# Patient Record
Sex: FEMALE | Race: WHITE | Employment: OTHER | URBAN - METROPOLITAN AREA
[De-identification: names, ages, dates, MRNs, and addresses within clinical notes are randomized per-mention and may not be internally consistent; named-entity substitution may affect disease eponyms.]

---

## 2018-11-22 ENCOUNTER — HOSPITAL ENCOUNTER (EMERGENCY)
Age: 48
Discharge: HOME | End: 2018-11-22
Payer: COMMERCIAL

## 2018-11-22 VITALS
HEIGHT: 62 IN | DIASTOLIC BLOOD PRESSURE: 60 MMHG | OXYGEN SATURATION: 97 % | HEART RATE: 106 BPM | SYSTOLIC BLOOD PRESSURE: 152 MMHG | BODY MASS INDEX: 30.82 KG/M2 | TEMPERATURE: 97.6 F | RESPIRATION RATE: 16 BRPM | WEIGHT: 167.5 LBS

## 2018-11-22 DIAGNOSIS — M53.3 COCCYX PAIN: Primary | ICD-10-CM

## 2018-11-22 PROCEDURE — 99283 EMERGENCY DEPT VISIT LOW MDM: CPT

## 2018-11-22 RX ORDER — DIVALPROEX SODIUM 250 MG/1
250 TABLET, DELAYED RELEASE ORAL 3 TIMES DAILY
Status: ON HOLD | COMMUNITY
End: 2022-02-12 | Stop reason: HOSPADM

## 2018-11-22 RX ORDER — KETOROLAC TROMETHAMINE 10 MG/1
10 TABLET, FILM COATED ORAL EVERY 6 HOURS PRN
Qty: 20 TABLET | Refills: 0 | Status: ON HOLD | OUTPATIENT
Start: 2018-11-22 | End: 2022-02-12 | Stop reason: HOSPADM

## 2018-11-22 RX ORDER — ATORVASTATIN CALCIUM 40 MG/1
40 TABLET, FILM COATED ORAL DAILY
COMMUNITY

## 2018-11-22 ASSESSMENT — PAIN DESCRIPTION - DESCRIPTORS: DESCRIPTORS: BURNING;DISCOMFORT

## 2018-11-22 ASSESSMENT — PAIN DESCRIPTION - LOCATION: LOCATION: RECTUM

## 2018-11-22 ASSESSMENT — PAIN DESCRIPTION - PAIN TYPE: TYPE: ACUTE PAIN

## 2018-11-22 ASSESSMENT — PAIN SCALES - GENERAL: PAINLEVEL_OUTOF10: 9

## 2018-11-22 NOTE — ED PROVIDER NOTES
Independent MLP      HPI:  11/22/18,   Time: 1:30 PM         Elisa Carter is a 50 y.o. female presenting to the ED for redness and pain at base of spine, beginning few days ago. The complaint has been intermittent, moderate in severity, and worsened by sitting. Pt presents with intermittent pain and burning at base of spine. States there has been intermittent drainage past few days. States she was seen yesterday by GI and given script for abs. States they told her to come to ED but she waited til she had someone to watch her child. She has never had anything like this before. No hx of skin or soft tissue infection. No known hx of MRSA. States she didn't  abs yet because pharmacy was closed. Denies fever, chills, vomiting or abdominal pain. Bowels moving well. ROS:     Constitutional: Negative for fever and chills  HENT: Negative for ear pain, sore throat and sinus pressure  Eyes: Negative for pain, discharge and redness  Respiratory:  Negative for shortness of breath, cough and wheezing  Cardiovascular: Negative for CP, edema or palpitations  Gastrointestinal: Negative for nausea, vomiting, diarrhea and abdominal distention  Genitourinary: Negative for dysuria and frequency  Musculoskeletal:  See HPI  Skin:  See HPI  Neurological: Negative for weakness and headaches  Hematological: Negative for adenopathy    All other systems reviewed and are negative      -------------------------------- PAST HISTORY ----------------------------------  Past Medical History:  has a past medical history of Hypertension and Panic attacks. Past Surgical History:  has a past surgical history that includes Foot surgery; laparotomy; Colonoscopy; Endoscopy, colon, diagnostic; Foot surgery (Left, 3/27/13); and Upper gastrointestinal endoscopy (05/20/2014). Social History:  reports that she has been smoking Cigarettes. She has a 20.00 pack-year smoking history.  She has never used smokeless tobacco. She

## 2019-02-17 ENCOUNTER — HOSPITAL ENCOUNTER (EMERGENCY)
Age: 49
Discharge: HOME | End: 2019-02-17
Payer: COMMERCIAL

## 2019-02-17 VITALS
HEIGHT: 62 IN | OXYGEN SATURATION: 98 % | SYSTOLIC BLOOD PRESSURE: 129 MMHG | WEIGHT: 150 LBS | RESPIRATION RATE: 18 BRPM | BODY MASS INDEX: 27.6 KG/M2 | TEMPERATURE: 98.1 F | DIASTOLIC BLOOD PRESSURE: 81 MMHG | HEART RATE: 109 BPM

## 2019-02-17 DIAGNOSIS — M79.5 FOREIGN BODY (FB) IN SOFT TISSUE: Primary | ICD-10-CM

## 2019-02-17 PROCEDURE — 6360000002 HC RX W HCPCS: Performed by: PHYSICIAN ASSISTANT

## 2019-02-17 PROCEDURE — 90715 TDAP VACCINE 7 YRS/> IM: CPT | Performed by: PHYSICIAN ASSISTANT

## 2019-02-17 PROCEDURE — 6370000000 HC RX 637 (ALT 250 FOR IP): Performed by: PHYSICIAN ASSISTANT

## 2019-02-17 PROCEDURE — 99283 EMERGENCY DEPT VISIT LOW MDM: CPT

## 2019-02-17 PROCEDURE — 28190 REMOVAL OF FOOT FOREIGN BODY: CPT

## 2019-02-17 PROCEDURE — 90471 IMMUNIZATION ADMIN: CPT | Performed by: PHYSICIAN ASSISTANT

## 2019-02-17 RX ORDER — LIDOCAINE AND PRILOCAINE 25; 25 MG/G; MG/G
CREAM TOPICAL ONCE
Status: DISCONTINUED | OUTPATIENT
Start: 2019-02-17 | End: 2019-02-17 | Stop reason: CLARIF

## 2019-02-17 RX ORDER — LIDOCAINE AND PRILOCAINE 25; 25 MG/G; MG/G
CREAM TOPICAL ONCE
Status: COMPLETED | OUTPATIENT
Start: 2019-02-17 | End: 2019-02-17

## 2019-02-17 RX ORDER — CEPHALEXIN 500 MG/1
500 CAPSULE ORAL 3 TIMES DAILY
Qty: 21 CAPSULE | Refills: 0 | Status: SHIPPED | OUTPATIENT
Start: 2019-02-17 | End: 2019-02-24

## 2019-02-17 RX ADMIN — LIDOCAINE AND PRILOCAINE: 25; 25 CREAM TOPICAL at 11:32

## 2019-02-17 RX ADMIN — TETANUS TOXOID, REDUCED DIPHTHERIA TOXOID AND ACELLULAR PERTUSSIS VACCINE, ADSORBED 0.5 ML: 5; 2.5; 8; 8; 2.5 SUSPENSION INTRAMUSCULAR at 11:30

## 2019-02-17 ASSESSMENT — PAIN SCALES - GENERAL: PAINLEVEL_OUTOF10: 8

## 2019-03-29 ENCOUNTER — HOSPITAL ENCOUNTER (EMERGENCY)
Age: 49
Discharge: HOME | End: 2019-03-29
Attending: EMERGENCY MEDICINE
Payer: COMMERCIAL

## 2019-03-29 VITALS
DIASTOLIC BLOOD PRESSURE: 70 MMHG | HEART RATE: 91 BPM | BODY MASS INDEX: 32.52 KG/M2 | OXYGEN SATURATION: 98 % | RESPIRATION RATE: 16 BRPM | HEIGHT: 63 IN | WEIGHT: 183.56 LBS | SYSTOLIC BLOOD PRESSURE: 105 MMHG | TEMPERATURE: 98.4 F

## 2019-03-29 DIAGNOSIS — K64.8 INTERNAL HEMORRHOIDS: ICD-10-CM

## 2019-03-29 DIAGNOSIS — K62.5 RECTAL BLEEDING: Primary | ICD-10-CM

## 2019-03-29 LAB
ALBUMIN SERPL-MCNC: 3.6 G/DL (ref 3.5–5.2)
ALP BLD-CCNC: 97 U/L (ref 35–104)
ALT SERPL-CCNC: 24 U/L (ref 0–32)
ANION GAP SERPL CALCULATED.3IONS-SCNC: 11 MMOL/L (ref 7–16)
AST SERPL-CCNC: 25 U/L (ref 0–31)
BASOPHILS ABSOLUTE: 0.04 E9/L (ref 0–0.2)
BASOPHILS RELATIVE PERCENT: 0.4 % (ref 0–2)
BILIRUB SERPL-MCNC: <0.2 MG/DL (ref 0–1.2)
BUN BLDV-MCNC: 19 MG/DL (ref 6–20)
CALCIUM SERPL-MCNC: 9.1 MG/DL (ref 8.6–10.2)
CHLORIDE BLD-SCNC: 108 MMOL/L (ref 98–107)
CO2: 23 MMOL/L (ref 22–29)
CREAT SERPL-MCNC: 1 MG/DL (ref 0.5–1)
EOSINOPHILS ABSOLUTE: 0.16 E9/L (ref 0.05–0.5)
EOSINOPHILS RELATIVE PERCENT: 1.7 % (ref 0–6)
GFR AFRICAN AMERICAN: >60
GFR NON-AFRICAN AMERICAN: 59 ML/MIN/1.73
GLUCOSE BLD-MCNC: 81 MG/DL (ref 74–99)
HCT VFR BLD CALC: 38.6 % (ref 34–48)
HEMOGLOBIN: 12.9 G/DL (ref 11.5–15.5)
IMMATURE GRANULOCYTES #: 0.06 E9/L
IMMATURE GRANULOCYTES %: 0.6 % (ref 0–5)
LACTIC ACID: 1 MMOL/L (ref 0.5–2.2)
LIPASE: 32 U/L (ref 13–60)
LYMPHOCYTES ABSOLUTE: 2.04 E9/L (ref 1.5–4)
LYMPHOCYTES RELATIVE PERCENT: 22.1 % (ref 20–42)
MCH RBC QN AUTO: 30 PG (ref 26–35)
MCHC RBC AUTO-ENTMCNC: 33.4 % (ref 32–34.5)
MCV RBC AUTO: 89.8 FL (ref 80–99.9)
MONOCYTES ABSOLUTE: 0.64 E9/L (ref 0.1–0.95)
MONOCYTES RELATIVE PERCENT: 6.9 % (ref 2–12)
NEUTROPHILS ABSOLUTE: 6.31 E9/L (ref 1.8–7.3)
NEUTROPHILS RELATIVE PERCENT: 68.3 % (ref 43–80)
PDW BLD-RTO: 11.9 FL (ref 11.5–15)
PLATELET # BLD: 232 E9/L (ref 130–450)
PMV BLD AUTO: 10.4 FL (ref 7–12)
POTASSIUM SERPL-SCNC: 4.1 MMOL/L (ref 3.5–5)
RBC # BLD: 4.3 E12/L (ref 3.5–5.5)
SODIUM BLD-SCNC: 142 MMOL/L (ref 132–146)
TOTAL PROTEIN: 6.6 G/DL (ref 6.4–8.3)
WBC # BLD: 9.3 E9/L (ref 4.5–11.5)

## 2019-03-29 PROCEDURE — 83690 ASSAY OF LIPASE: CPT

## 2019-03-29 PROCEDURE — 80053 COMPREHEN METABOLIC PANEL: CPT

## 2019-03-29 PROCEDURE — 36415 COLL VENOUS BLD VENIPUNCTURE: CPT

## 2019-03-29 PROCEDURE — C9113 INJ PANTOPRAZOLE SODIUM, VIA: HCPCS | Performed by: NURSE PRACTITIONER

## 2019-03-29 PROCEDURE — 2580000003 HC RX 258: Performed by: NURSE PRACTITIONER

## 2019-03-29 PROCEDURE — 6370000000 HC RX 637 (ALT 250 FOR IP): Performed by: NURSE PRACTITIONER

## 2019-03-29 PROCEDURE — 83605 ASSAY OF LACTIC ACID: CPT

## 2019-03-29 PROCEDURE — 96374 THER/PROPH/DIAG INJ IV PUSH: CPT

## 2019-03-29 PROCEDURE — 85025 COMPLETE CBC W/AUTO DIFF WBC: CPT

## 2019-03-29 PROCEDURE — 6360000002 HC RX W HCPCS: Performed by: NURSE PRACTITIONER

## 2019-03-29 PROCEDURE — 99283 EMERGENCY DEPT VISIT LOW MDM: CPT

## 2019-03-29 PROCEDURE — 96375 TX/PRO/DX INJ NEW DRUG ADDON: CPT

## 2019-03-29 RX ORDER — METOCLOPRAMIDE HYDROCHLORIDE 5 MG/ML
10 INJECTION INTRAMUSCULAR; INTRAVENOUS ONCE
Status: COMPLETED | OUTPATIENT
Start: 2019-03-29 | End: 2019-03-29

## 2019-03-29 RX ORDER — PANTOPRAZOLE SODIUM 40 MG/10ML
40 INJECTION, POWDER, LYOPHILIZED, FOR SOLUTION INTRAVENOUS ONCE
Status: COMPLETED | OUTPATIENT
Start: 2019-03-29 | End: 2019-03-29

## 2019-03-29 RX ORDER — MAGNESIUM CARB/ALUMINUM HYDROX 105-160MG
30 TABLET,CHEWABLE ORAL ONCE
Status: COMPLETED | OUTPATIENT
Start: 2019-03-29 | End: 2019-03-29

## 2019-03-29 RX ORDER — METOCLOPRAMIDE 10 MG/1
10 TABLET ORAL 4 TIMES DAILY
Qty: 20 TABLET | Refills: 0 | Status: SHIPPED | OUTPATIENT
Start: 2019-03-29

## 2019-03-29 RX ORDER — DOCUSATE SODIUM 100 MG/1
100 CAPSULE, LIQUID FILLED ORAL 2 TIMES DAILY
Qty: 60 CAPSULE | Refills: 0 | Status: ON HOLD | OUTPATIENT
Start: 2019-03-29 | End: 2022-02-12 | Stop reason: HOSPADM

## 2019-03-29 RX ORDER — 0.9 % SODIUM CHLORIDE 0.9 %
1000 INTRAVENOUS SOLUTION INTRAVENOUS ONCE
Status: COMPLETED | OUTPATIENT
Start: 2019-03-29 | End: 2019-03-29

## 2019-03-29 RX ORDER — HYDROCORTISONE ACETATE 25 MG/1
25 SUPPOSITORY RECTAL 2 TIMES DAILY
Qty: 24 SUPPOSITORY | Refills: 0 | Status: SHIPPED | OUTPATIENT
Start: 2019-03-29 | End: 2019-04-10

## 2019-03-29 RX ORDER — ONDANSETRON 4 MG/1
4 TABLET, ORALLY DISINTEGRATING ORAL ONCE
Status: COMPLETED | OUTPATIENT
Start: 2019-03-29 | End: 2019-03-29

## 2019-03-29 RX ADMIN — ONDANSETRON 4 MG: 4 TABLET, ORALLY DISINTEGRATING ORAL at 12:08

## 2019-03-29 RX ADMIN — SODIUM CHLORIDE 1000 ML: 9 INJECTION, SOLUTION INTRAVENOUS at 11:24

## 2019-03-29 RX ADMIN — PANTOPRAZOLE SODIUM 40 MG: 40 INJECTION, POWDER, FOR SOLUTION INTRAVENOUS at 11:22

## 2019-03-29 RX ADMIN — MINERAL OIL 30 ML: 1000 SOLUTION ORAL at 12:07

## 2019-03-29 RX ADMIN — METOCLOPRAMIDE 10 MG: 5 INJECTION, SOLUTION INTRAMUSCULAR; INTRAVENOUS at 12:17

## 2019-03-29 ASSESSMENT — PAIN DESCRIPTION - LOCATION
LOCATION: ABDOMEN
LOCATION: ABDOMEN

## 2019-03-29 ASSESSMENT — PAIN DESCRIPTION - PAIN TYPE: TYPE: ACUTE PAIN

## 2019-03-29 ASSESSMENT — PAIN SCALES - GENERAL
PAINLEVEL_OUTOF10: 3
PAINLEVEL_OUTOF10: 3

## 2019-03-29 ASSESSMENT — PAIN DESCRIPTION - ORIENTATION: ORIENTATION: LOWER

## 2019-03-29 ASSESSMENT — PAIN DESCRIPTION - DESCRIPTORS: DESCRIPTORS: CRAMPING

## 2019-03-29 NOTE — ED PROVIDER NOTES
ED Attending  CC: Loren       Department of Emergency Medicine   ED  Provider Note  Admit Date/RoomTime: 3/29/2019 10:27 AM  ED Room: 13/13   Chief Complaint   Rectal Bleeding (bright red blood in stool this am )    History of Present Illness   Source of history provided by:  patient. History/Exam Limitations: none. Jignesh Syed is a 52 y.o. old female who  has a past medical history of Hypertension and Panic attacks. , presents to the emergency department by private vehicle, for possible GI bleeding as evidenced by bright red blood per rectum, which began 1 day(s) prior to arrival.  Symptoms are associated with bloody stools. Since onset the symptoms have been intermittent and persistent and moderate in severity. If Vomiting, Then:     []  Bright Red Blood by Emesis     []  Streaking of Blood     []  Coffee Ground Appearance        If Stools, Then:    [x]  Bright Red Blood     []  Streaking of Blood     []  Clement Pulling / Dale Heft     [x]  Blood on Tissue Paper     Recent Use of:     []  Ethanol Use     [x]  NSAID's Use     []  Steroid Use   History of:    []  GI Bleeding     []  Peptic Ulcer Disease     []  Esophageal Varicies     []  Liver Disease       []  Aortic Graft Surgery     []  Coagulopathy     []  Current Anticoagulant Use     ROS    Pertinent positives and negatives are stated within HPI, all other systems reviewed and are negative. Past Surgical History:   Procedure Laterality Date    COLONOSCOPY      ENDOSCOPY, COLON, DIAGNOSTIC      FOOT SURGERY      right toes    FOOT SURGERY Left 3/27/13    correction 4th, 5th toes left foot    LAPAROTOMY      UPPER GASTROINTESTINAL ENDOSCOPY  05/20/2014   Social History:  reports that she has been smoking cigarettes. She has a 20.00 pack-year smoking history. She has never used smokeless tobacco. She reports that she does not drink alcohol or use drugs. Family History: family history is not on file.   Allergies: Bactrim [sulfamethoxazole-trimethoprim]    Physical Exam           ED Triage Vitals [03/29/19 1024]   BP Temp Temp Source Pulse Resp SpO2 Height Weight   134/88 98.4 °F (36.9 °C) Oral 110 16 99 % 5' 2.5\" (1.588 m) 183 lb 9 oz (83.3 kg)      Oxygen Saturation Interpretation: Normal.    Constitutional:  Alert, development consistent with age. Eyes:  PERRL, EOMI, no discharge or conjunctival injection. Ears:  External ears without lesions. Throat:  Pharynx without injection, exudate, or tonsillar hypertrophy. Airway patient. Neck:  Normal ROM. Supple. Respiratory:  Clear to auscultation and breath sounds equal.  CV:  Regular rate and rhythm, normal heart sounds, without pathological murmurs, ectopy, gallops, or rubs. GI:  General Appearance: normal.       Bowel sounds: normal bowel sounds. Distension:  None. Tenderness: mild lower abdominal tenderness. Liver: non-palpable and non-tender. Spleen:  non-palpable and non-tender. Pulsatile Mass: absent. Hernia:  no inguinal or femoral hernias noted. /Rectal: (chaperone present during examination). no tenderness noted, external hemorrhoids noted, stool guaiac positive. Back: CVA Tenderness: No.  Integument:  Normal turgor. Warm, dry, without visible rash, unless noted elsewhere. Lymphatic: no lymphadenopathy noted  Neurological:  Oriented. Motor functions intact.     Lab / Imaging Results   (All laboratory and radiology results have been personally reviewed by myself)  Labs:  Results for orders placed or performed during the hospital encounter of 03/29/19   CBC auto differential   Result Value Ref Range    WBC 9.3 4.5 - 11.5 E9/L    RBC 4.30 3.50 - 5.50 E12/L    Hemoglobin 12.9 11.5 - 15.5 g/dL    Hematocrit 38.6 34.0 - 48.0 %    MCV 89.8 80.0 - 99.9 fL    MCH 30.0 26.0 - 35.0 pg    MCHC 33.4 32.0 - 34.5 %    RDW 11.9 11.5 - 15.0 fL    Platelets 693 158 - 637 E9/L    MPV 10.4 7.0 - 12.0 fL Neutrophils % 68.3 43.0 - 80.0 %    Immature Granulocytes % 0.6 0.0 - 5.0 %    Lymphocytes % 22.1 20.0 - 42.0 %    Monocytes % 6.9 2.0 - 12.0 %    Eosinophils % 1.7 0.0 - 6.0 %    Basophils % 0.4 0.0 - 2.0 %    Neutrophils # 6.31 1.80 - 7.30 E9/L    Immature Granulocytes # 0.06 E9/L    Lymphocytes # 2.04 1.50 - 4.00 E9/L    Monocytes # 0.64 0.10 - 0.95 E9/L    Eosinophils # 0.16 0.05 - 0.50 E9/L    Basophils # 0.04 0.00 - 0.20 E9/L   Comprehensive Metabolic Panel   Result Value Ref Range    Sodium 142 132 - 146 mmol/L    Potassium 4.1 3.5 - 5.0 mmol/L    Chloride 108 (H) 98 - 107 mmol/L    CO2 23 22 - 29 mmol/L    Anion Gap 11 7 - 16 mmol/L    Glucose 81 74 - 99 mg/dL    BUN 19 6 - 20 mg/dL    CREATININE 1.0 0.5 - 1.0 mg/dL    GFR Non-African American 59 >=60 mL/min/1.73    GFR African American >60     Calcium 9.1 8.6 - 10.2 mg/dL    Total Protein 6.6 6.4 - 8.3 g/dL    Alb 3.6 3.5 - 5.2 g/dL    Total Bilirubin <0.2 0.0 - 1.2 mg/dL    Alkaline Phosphatase 97 35 - 104 U/L    ALT 24 0 - 32 U/L    AST 25 0 - 31 U/L   Lipase   Result Value Ref Range    Lipase 32 13 - 60 U/L   Lactic Acid, Plasma   Result Value Ref Range    Lactic Acid 1.0 0.5 - 2.2 mmol/L     Imaging: All Radiology results interpreted by Radiologist unless otherwise noted.   No orders to display       ED Course / Medical Decision Making     Medications   metoclopramide (REGLAN) injection 10 mg (has no administration in time range)   pantoprazole (PROTONIX) injection 40 mg (40 mg Intravenous Given 3/29/19 1122)   0.9 % sodium chloride bolus (0 mLs Intravenous Stopped 3/29/19 1206)   mineral oil liquid 30 mL (30 mLs Oral Given 3/29/19 1207)   ondansetron (ZOFRAN-ODT) disintegrating tablet 4 mg (4 mg Oral Given 3/29/19 1208)     ED Course as of Mar 29 1211   Fri Mar 29, 2019   1055 ATTENDING PROVIDER ATTESTATION:     I have personally performed and/or participated in the history, exam, medical decision making, and procedures and agree with all pertinent clinical information unless otherwise noted. I have also reviewed and agree with the past medical, family and social history unless otherwise noted. My findings/plan: Patient here complaining of rectal bleeding starting this morning. She states that when she had a bowel movement this morning there was blood in the toilet. She states that she wiped and there was some bright red blood. Denies dysuria or hematuria. States that she waited a while and then urinated and had no blood so she pushed into him to have a bowel movement and had some more blood come out. She states she does bout of chronic constipation. Denies fevers, sweats or chills and denies any abdominal pain at this time. Admits to being very nervous and anxious about this, called her GI doctor's office who told her to come in for evaluation in the ER. On exam she is nervous, anxious. Minimally tachycardic. Lungs are clear and equal. No jaundice or icterus. Abdomen soft and nontender. No gross rashes, no petechia or purpura. Abdomen is nontender with no distention. Rectal exam performed by mid-level provider showed brown stool which was guaiac positive with no gross bleeding. [NC]      ED Course User Index  [NC] Jair Gallegos, DO     Re-Evaluations:  3/29/19      Time: 1200    Patients symptoms are improving. Consultations:             None    Procedures:   none    MDM:  Patient has remained stable while in the ER and reports feeing much improved- she has had no re occurrence of symptoms. She will be treated with stool softened=rs and hemorrhoid cream and is then stable for discharge home. She will follow up with her Gastroenterologist next week and was instructed to return to the ER for worsening symptoms or any concerns.     Counseling:   I have spoken with the patient and discussed todays results, in addition to providing specific details for the plan of care and counseling regarding the diagnosis and prognosis and are agreeable with the plan. Assessment      1. Rectal bleeding    2. Internal hemorrhoids      This patient's ED course included: a personal history and physicial examination, re-evaluation prior to disposition and multiple bedside re-evaluations    This patient has remained hemodynamically stable and improved during their ED course. Plan   Discharge to home. Patient condition is stable. New Medications     New Prescriptions    DOCUSATE SODIUM (COLACE) 100 MG CAPSULE    Take 1 capsule by mouth 2 times daily    HYDROCORTISONE (ANUSOL-HC) 25 MG SUPPOSITORY    Place 1 suppository rectally 2 times daily for 12 days    METOCLOPRAMIDE (REGLAN) 10 MG TABLET    Take 1 tablet by mouth 4 times daily     Electronically signed by IKE Ruiz CNP   DD: 3/29/19  **This report was transcribed using voice recognition software. Every effort was made to ensure accuracy; however, inadvertent computerized transcription errors may be present.   END OF PROVIDER NOTE      IKE Ruiz CNP  03/29/19 0901

## 2019-04-02 ENCOUNTER — HOSPITAL ENCOUNTER (OUTPATIENT)
Age: 49
Discharge: HOME | End: 2019-04-02
Payer: COMMERCIAL

## 2019-04-02 LAB — VALPROIC ACID LEVEL: 16 MCG/ML (ref 50–100)

## 2019-04-02 PROCEDURE — 36415 COLL VENOUS BLD VENIPUNCTURE: CPT

## 2019-04-02 PROCEDURE — 80164 ASSAY DIPROPYLACETIC ACD TOT: CPT

## 2019-05-08 ENCOUNTER — HOSPITAL ENCOUNTER (EMERGENCY)
Age: 49
Discharge: HOME | End: 2019-05-08
Attending: EMERGENCY MEDICINE
Payer: COMMERCIAL

## 2019-05-08 ENCOUNTER — APPOINTMENT (OUTPATIENT)
Dept: CT IMAGING | Age: 49
End: 2019-05-08
Payer: COMMERCIAL

## 2019-05-08 VITALS
SYSTOLIC BLOOD PRESSURE: 140 MMHG | DIASTOLIC BLOOD PRESSURE: 85 MMHG | WEIGHT: 160 LBS | BODY MASS INDEX: 29.44 KG/M2 | RESPIRATION RATE: 18 BRPM | TEMPERATURE: 98.1 F | HEART RATE: 112 BPM | OXYGEN SATURATION: 100 % | HEIGHT: 62 IN

## 2019-05-08 DIAGNOSIS — R10.9 FLANK PAIN: Primary | ICD-10-CM

## 2019-05-08 LAB
ALBUMIN SERPL-MCNC: 3.9 G/DL (ref 3.5–5.2)
ALP BLD-CCNC: 88 U/L (ref 35–104)
ALT SERPL-CCNC: 8 U/L (ref 0–32)
ANION GAP SERPL CALCULATED.3IONS-SCNC: 10 MMOL/L (ref 7–16)
AST SERPL-CCNC: 10 U/L (ref 0–31)
BACTERIA: ABNORMAL /HPF
BASOPHILS ABSOLUTE: 0.06 E9/L (ref 0–0.2)
BASOPHILS RELATIVE PERCENT: 0.7 % (ref 0–2)
BILIRUB SERPL-MCNC: <0.2 MG/DL (ref 0–1.2)
BILIRUBIN URINE: NEGATIVE
BLOOD, URINE: ABNORMAL
BUN BLDV-MCNC: 24 MG/DL (ref 6–20)
CALCIUM SERPL-MCNC: 9.3 MG/DL (ref 8.6–10.2)
CHLORIDE BLD-SCNC: 104 MMOL/L (ref 98–107)
CLARITY: CLEAR
CO2: 25 MMOL/L (ref 22–29)
COLOR: YELLOW
CREAT SERPL-MCNC: 0.9 MG/DL (ref 0.5–1)
EOSINOPHILS ABSOLUTE: 0.28 E9/L (ref 0.05–0.5)
EOSINOPHILS RELATIVE PERCENT: 3.3 % (ref 0–6)
EPITHELIAL CELLS, UA: ABNORMAL /HPF
GFR AFRICAN AMERICAN: >60
GFR NON-AFRICAN AMERICAN: >60 ML/MIN/1.73
GLUCOSE BLD-MCNC: 90 MG/DL (ref 74–99)
GLUCOSE URINE: NEGATIVE MG/DL
HCG, URINE, POC: NEGATIVE
HCT VFR BLD CALC: 39.7 % (ref 34–48)
HEMOGLOBIN: 13.4 G/DL (ref 11.5–15.5)
IMMATURE GRANULOCYTES #: 0.09 E9/L
IMMATURE GRANULOCYTES %: 1.1 % (ref 0–5)
KETONES, URINE: NEGATIVE MG/DL
LEUKOCYTE ESTERASE, URINE: NEGATIVE
LYMPHOCYTES ABSOLUTE: 2.45 E9/L (ref 1.5–4)
LYMPHOCYTES RELATIVE PERCENT: 28.6 % (ref 20–42)
Lab: NORMAL
MCH RBC QN AUTO: 29.8 PG (ref 26–35)
MCHC RBC AUTO-ENTMCNC: 33.8 % (ref 32–34.5)
MCV RBC AUTO: 88.4 FL (ref 80–99.9)
MONOCYTES ABSOLUTE: 0.94 E9/L (ref 0.1–0.95)
MONOCYTES RELATIVE PERCENT: 11 % (ref 2–12)
NEGATIVE QC PASS/FAIL: NORMAL
NEUTROPHILS ABSOLUTE: 4.75 E9/L (ref 1.8–7.3)
NEUTROPHILS RELATIVE PERCENT: 55.3 % (ref 43–80)
NITRITE, URINE: NEGATIVE
PDW BLD-RTO: 12.2 FL (ref 11.5–15)
PH UA: 6.5 (ref 5–9)
PLATELET # BLD: 236 E9/L (ref 130–450)
PMV BLD AUTO: 10.7 FL (ref 7–12)
POSITIVE QC PASS/FAIL: NORMAL
POTASSIUM SERPL-SCNC: 3.9 MMOL/L (ref 3.5–5)
PROTEIN UA: NEGATIVE MG/DL
RBC # BLD: 4.49 E12/L (ref 3.5–5.5)
RBC UA: ABNORMAL /HPF (ref 0–2)
SODIUM BLD-SCNC: 139 MMOL/L (ref 132–146)
SPECIFIC GRAVITY UA: 1.01 (ref 1–1.03)
TOTAL PROTEIN: 6.4 G/DL (ref 6.4–8.3)
UROBILINOGEN, URINE: 0.2 E.U./DL
WBC # BLD: 8.6 E9/L (ref 4.5–11.5)
WBC UA: ABNORMAL /HPF (ref 0–5)

## 2019-05-08 PROCEDURE — 96374 THER/PROPH/DIAG INJ IV PUSH: CPT

## 2019-05-08 PROCEDURE — 80053 COMPREHEN METABOLIC PANEL: CPT

## 2019-05-08 PROCEDURE — 36415 COLL VENOUS BLD VENIPUNCTURE: CPT

## 2019-05-08 PROCEDURE — 2580000003 HC RX 258: Performed by: EMERGENCY MEDICINE

## 2019-05-08 PROCEDURE — 99284 EMERGENCY DEPT VISIT MOD MDM: CPT

## 2019-05-08 PROCEDURE — 85025 COMPLETE CBC W/AUTO DIFF WBC: CPT

## 2019-05-08 PROCEDURE — 6360000002 HC RX W HCPCS: Performed by: EMERGENCY MEDICINE

## 2019-05-08 PROCEDURE — 81001 URINALYSIS AUTO W/SCOPE: CPT

## 2019-05-08 PROCEDURE — 74176 CT ABD & PELVIS W/O CONTRAST: CPT

## 2019-05-08 RX ORDER — 0.9 % SODIUM CHLORIDE 0.9 %
1000 INTRAVENOUS SOLUTION INTRAVENOUS ONCE
Status: COMPLETED | OUTPATIENT
Start: 2019-05-08 | End: 2019-05-08

## 2019-05-08 RX ORDER — KETOROLAC TROMETHAMINE 30 MG/ML
30 INJECTION, SOLUTION INTRAMUSCULAR; INTRAVENOUS ONCE
Status: COMPLETED | OUTPATIENT
Start: 2019-05-08 | End: 2019-05-08

## 2019-05-08 RX ADMIN — KETOROLAC TROMETHAMINE 30 MG: 30 INJECTION, SOLUTION INTRAMUSCULAR; INTRAVENOUS at 10:36

## 2019-05-08 RX ADMIN — SODIUM CHLORIDE 1000 ML: 9 INJECTION, SOLUTION INTRAVENOUS at 10:35

## 2019-05-08 ASSESSMENT — PAIN DESCRIPTION - LOCATION
LOCATION: ABDOMEN
LOCATION: ABDOMEN

## 2019-05-08 ASSESSMENT — ENCOUNTER SYMPTOMS
DIARRHEA: 0
ABDOMINAL DISTENTION: 0
WHEEZING: 0
BLOOD IN STOOL: 1
VOMITING: 0
SORE THROAT: 0
NAUSEA: 0
COUGH: 0
BACK PAIN: 0
SHORTNESS OF BREATH: 0
ABDOMINAL PAIN: 0
CHEST TIGHTNESS: 0
SINUS PRESSURE: 0

## 2019-05-08 ASSESSMENT — PAIN SCALES - GENERAL
PAINLEVEL_OUTOF10: 2
PAINLEVEL_OUTOF10: 6
PAINLEVEL_OUTOF10: 7

## 2019-05-08 ASSESSMENT — PAIN DESCRIPTION - PAIN TYPE
TYPE: ACUTE PAIN
TYPE: ACUTE PAIN

## 2019-05-08 ASSESSMENT — PAIN DESCRIPTION - ORIENTATION
ORIENTATION: RIGHT
ORIENTATION: MID

## 2019-05-08 ASSESSMENT — PAIN DESCRIPTION - DESCRIPTORS: DESCRIPTORS: ACHING

## 2019-05-08 NOTE — ED PROVIDER NOTES
Chief complaint:  Rectal bleeding    HPI history provided by the patient  Patient with a history of rectal bleeding, states that she was seen a week or 2 ago for this and has a follow-up appointment with her GI doctor. Has a chronic history of colitis with intermittent diarrhea and constipation. States that over the last couple days she developed increasing right flank pain coming around to the right abdomen. No nausea or vomiting with it although states her stool has gone from bright red blood to dark stool now. She states she called her GI doctor told her to go to the ER. Denies fevers, sweats or chills. No dysuria or hematuria. No chest pain, palpitations or shortness of breath. No lightheadedness or syncope. No treatment prior to arrival.    Review of Systems   Constitutional: Negative for chills, diaphoresis, fatigue and fever. HENT: Negative for congestion, sinus pressure and sore throat. Respiratory: Negative for cough, chest tightness, shortness of breath and wheezing. Cardiovascular: Negative for chest pain, palpitations and leg swelling. Gastrointestinal: Positive for blood in stool. Negative for abdominal distention, abdominal pain, diarrhea, nausea and vomiting. Genitourinary: Positive for flank pain. Negative for dysuria, frequency, hematuria and urgency. Musculoskeletal: Negative for arthralgias, back pain, gait problem, joint swelling, myalgias, neck pain and neck stiffness. Skin: Negative for rash and wound. Neurological: Negative for dizziness, seizures, syncope, weakness, light-headedness, numbness and headaches. Hematological: Negative for adenopathy. All other systems reviewed and are negative. Physical Exam   Constitutional: She is oriented to person, place, and time. She appears well-developed and well-nourished. Non-toxic appearance. She does not have a sickly appearance. She does not appear ill. No distress. HENT:   Head: Normocephalic and atraumatic.    Eyes: Pupils are equal, round, and reactive to light. Conjunctivae and EOM are normal. No scleral icterus. Neck: Normal range of motion. Neck supple. Cardiovascular: Normal rate, regular rhythm and normal heart sounds. No murmur heard. Pulmonary/Chest: Effort normal and breath sounds normal. No stridor. No respiratory distress. She has no decreased breath sounds. She has no wheezes. She has no rhonchi. She has no rales. She exhibits no tenderness. Abdominal: Soft. Normal appearance and bowel sounds are normal. She exhibits no abdominal bruit and no ascites. There is no tenderness. There is no rigidity, no rebound, no guarding and no CVA tenderness. Genitourinary: Rectum normal. Rectal exam shows no fissure, no mass, no tenderness and guaiac negative stool. Genitourinary Comments: Dark stool, guaiac-negative, controls reacted appropriately. No gross bleeding. Musculoskeletal: She exhibits no edema, tenderness or deformity. Neurological: She is alert and oriented to person, place, and time. She is not disoriented. She displays no atrophy, no tremor and normal reflexes. No cranial nerve deficit or sensory deficit. She exhibits normal muscle tone. She displays no seizure activity. Coordination and gait normal. GCS eye subscore is 4. GCS verbal subscore is 5. GCS motor subscore is 6. Skin: Skin is warm and dry. Psychiatric: Her mood appears anxious. Nursing note and vitals reviewed. Procedures    [facility]            --------------------------------------------- PAST HISTORY ---------------------------------------------  Past Medical History:  has a past medical history of Hypertension and Panic attacks. Past Surgical History:  has a past surgical history that includes Foot surgery; laparotomy; Colonoscopy; Endoscopy, colon, diagnostic; Foot surgery (Left, 3/27/13); and Upper gastrointestinal endoscopy (05/20/2014). Social History:  reports that she has been smoking cigarettes.   She has a 20.00 pack-year smoking history. She has never used smokeless tobacco. She reports that she does not drink alcohol or use drugs. Family History: family history is not on file. The patients home medications have been reviewed.     Allergies: Bactrim [sulfamethoxazole-trimethoprim]    -------------------------------------------------- RESULTS -------------------------------------------------  Labs:  Results for orders placed or performed during the hospital encounter of 05/08/19   CBC Auto Differential   Result Value Ref Range    WBC 8.6 4.5 - 11.5 E9/L    RBC 4.49 3.50 - 5.50 E12/L    Hemoglobin 13.4 11.5 - 15.5 g/dL    Hematocrit 39.7 34.0 - 48.0 %    MCV 88.4 80.0 - 99.9 fL    MCH 29.8 26.0 - 35.0 pg    MCHC 33.8 32.0 - 34.5 %    RDW 12.2 11.5 - 15.0 fL    Platelets 954 023 - 508 E9/L    MPV 10.7 7.0 - 12.0 fL    Neutrophils % 55.3 43.0 - 80.0 %    Immature Granulocytes % 1.1 0.0 - 5.0 %    Lymphocytes % 28.6 20.0 - 42.0 %    Monocytes % 11.0 2.0 - 12.0 %    Eosinophils % 3.3 0.0 - 6.0 %    Basophils % 0.7 0.0 - 2.0 %    Neutrophils # 4.75 1.80 - 7.30 E9/L    Immature Granulocytes # 0.09 E9/L    Lymphocytes # 2.45 1.50 - 4.00 E9/L    Monocytes # 0.94 0.10 - 0.95 E9/L    Eosinophils # 0.28 0.05 - 0.50 E9/L    Basophils # 0.06 0.00 - 0.20 E9/L   Urinalysis   Result Value Ref Range    Color, UA Yellow Straw/Yellow    Clarity, UA Clear Clear    Glucose, Ur Negative Negative mg/dL    Bilirubin Urine Negative Negative    Ketones, Urine Negative Negative mg/dL    Specific Gravity, UA 1.015 1.005 - 1.030    Blood, Urine TRACE (A) Negative    pH, UA 6.5 5.0 - 9.0    Protein, UA Negative Negative mg/dL    Urobilinogen, Urine 0.2 <2.0 E.U./dL    Nitrite, Urine Negative Negative    Leukocyte Esterase, Urine Negative Negative   Comprehensive Metabolic Panel   Result Value Ref Range    Sodium 139 132 - 146 mmol/L    Potassium 3.9 3.5 - 5.0 mmol/L    Chloride 104 98 - 107 mmol/L    CO2 25 22 - 29 mmol/L    Anion Gap 10 7 - 16 mmol/L    Glucose 90 74 - 99 mg/dL    BUN 24 (H) 6 - 20 mg/dL    CREATININE 0.9 0.5 - 1.0 mg/dL    GFR Non-African American >60 >=60 mL/min/1.73    GFR African American >60     Calcium 9.3 8.6 - 10.2 mg/dL    Total Protein 6.4 6.4 - 8.3 g/dL    Alb 3.9 3.5 - 5.2 g/dL    Total Bilirubin <0.2 0.0 - 1.2 mg/dL    Alkaline Phosphatase 88 35 - 104 U/L    ALT 8 0 - 32 U/L    AST 10 0 - 31 U/L   Microscopic Urinalysis   Result Value Ref Range    WBC, UA 0-1 0 - 5 /HPF    RBC, UA 0-1 0 - 2 /HPF    Epi Cells FEW /HPF    Bacteria, UA MODERATE (A) /HPF   POC Pregnancy Urine Qual   Result Value Ref Range    HCG, Urine, POC Negative Negative    Lot Number [id number]     Positive QC Pass/Fail Pass     Negative QC Pass/Fail Pass        Radiology:  CT ABDOMEN PELVIS WO CONTRAST   Final Result   No acute finding                ------------------------- NURSING NOTES AND VITALS REVIEWED ---------------------------  Date / Time Roomed:  5/8/2019  9:38 AM  ED Bed Assignment:  12/12    The nursing notes within the ED encounter and vital signs as below have been reviewed. BP (!) 140/85   Pulse 112   Temp 98.1 °F (36.7 °C) (Oral)   Resp 18   Ht 5' 2\" (1.575 m)   Wt 160 lb (72.6 kg)   SpO2 100%   BMI 29.26 kg/m²   Oxygen Saturation Interpretation: Normal      ------------------------------------------ PROGRESS NOTES ------------------------------------------  I have spoken with the patient and discussed todays results, in addition to providing specific details for the plan of care and counseling regarding the diagnosis and prognosis. Their questions are answered at this time and they are agreeable with the plan. I discussed at length with them reasons for immediate return here for re evaluation. They will followup with primary care by calling their office tomorrow.       --------------------------------- ADDITIONAL PROVIDER NOTES ---------------------------------  At this time the patient is without objective evidence of an acute process requiring hospitalization or inpatient management. They have remained hemodynamically stable throughout their entire ED visit and are stable for discharge with outpatient follow-up. The plan has been discussed in detail and they are aware of the specific conditions for emergent return, as well as the importance of follow-up. New Prescriptions    No medications on file       Diagnosis:  1. Flank pain        Disposition:  Patient's disposition: Discharge to home  Patient's condition is stable.            Jian Connor DO  05/08/19 1112

## 2019-07-23 ENCOUNTER — HOSPITAL ENCOUNTER (OUTPATIENT)
Age: 49
Discharge: HOME | End: 2019-07-23
Payer: COMMERCIAL

## 2019-07-23 LAB — VALPROIC ACID LEVEL: 89 MCG/ML (ref 50–100)

## 2019-07-23 PROCEDURE — 80164 ASSAY DIPROPYLACETIC ACD TOT: CPT

## 2019-07-23 PROCEDURE — 36415 COLL VENOUS BLD VENIPUNCTURE: CPT

## 2020-03-16 ENCOUNTER — HOSPITAL ENCOUNTER (OUTPATIENT)
Age: 50
Discharge: HOME | End: 2020-03-16
Payer: COMMERCIAL

## 2020-03-16 LAB
ALBUMIN SERPL-MCNC: 4.3 G/DL (ref 3.5–5.2)
ALP BLD-CCNC: 138 U/L (ref 35–104)
ALT SERPL-CCNC: 18 U/L (ref 0–32)
AMYLASE: 109 U/L (ref 20–100)
ANION GAP SERPL CALCULATED.3IONS-SCNC: 11 MMOL/L (ref 7–16)
AST SERPL-CCNC: 19 U/L (ref 0–31)
BASOPHILS ABSOLUTE: 0.03 E9/L (ref 0–0.2)
BASOPHILS RELATIVE PERCENT: 0.4 % (ref 0–2)
BILIRUB SERPL-MCNC: <0.2 MG/DL (ref 0–1.2)
BUN BLDV-MCNC: 22 MG/DL (ref 6–20)
CALCIUM SERPL-MCNC: 9.5 MG/DL (ref 8.6–10.2)
CHLORIDE BLD-SCNC: 105 MMOL/L (ref 98–107)
CO2: 24 MMOL/L (ref 22–29)
CREAT SERPL-MCNC: 1 MG/DL (ref 0.5–1)
EOSINOPHILS ABSOLUTE: 0.19 E9/L (ref 0.05–0.5)
EOSINOPHILS RELATIVE PERCENT: 2.3 % (ref 0–6)
GFR AFRICAN AMERICAN: >60
GFR NON-AFRICAN AMERICAN: 59 ML/MIN/1.73
GLUCOSE BLD-MCNC: 117 MG/DL (ref 74–99)
HCT VFR BLD CALC: 33.6 % (ref 34–48)
HEMOGLOBIN: 10.3 G/DL (ref 11.5–15.5)
IMMATURE GRANULOCYTES #: 0.03 E9/L
IMMATURE GRANULOCYTES %: 0.4 % (ref 0–5)
LIPASE: 37 U/L (ref 13–60)
LYMPHOCYTES ABSOLUTE: 2.19 E9/L (ref 1.5–4)
LYMPHOCYTES RELATIVE PERCENT: 26.3 % (ref 20–42)
MCH RBC QN AUTO: 24.8 PG (ref 26–35)
MCHC RBC AUTO-ENTMCNC: 30.7 % (ref 32–34.5)
MCV RBC AUTO: 80.8 FL (ref 80–99.9)
MONOCYTES ABSOLUTE: 0.57 E9/L (ref 0.1–0.95)
MONOCYTES RELATIVE PERCENT: 6.8 % (ref 2–12)
NEUTROPHILS ABSOLUTE: 5.33 E9/L (ref 1.8–7.3)
NEUTROPHILS RELATIVE PERCENT: 63.8 % (ref 43–80)
PDW BLD-RTO: 14 FL (ref 11.5–15)
PLATELET # BLD: 385 E9/L (ref 130–450)
PMV BLD AUTO: 9.9 FL (ref 7–12)
POTASSIUM SERPL-SCNC: 4.3 MMOL/L (ref 3.5–5)
RBC # BLD: 4.16 E12/L (ref 3.5–5.5)
SODIUM BLD-SCNC: 140 MMOL/L (ref 132–146)
TOTAL PROTEIN: 7.1 G/DL (ref 6.4–8.3)
WBC # BLD: 8.3 E9/L (ref 4.5–11.5)

## 2020-03-16 PROCEDURE — 85025 COMPLETE CBC W/AUTO DIFF WBC: CPT

## 2020-03-16 PROCEDURE — 80053 COMPREHEN METABOLIC PANEL: CPT

## 2020-03-16 PROCEDURE — 36415 COLL VENOUS BLD VENIPUNCTURE: CPT

## 2020-03-16 PROCEDURE — 82150 ASSAY OF AMYLASE: CPT

## 2020-03-16 PROCEDURE — 83690 ASSAY OF LIPASE: CPT

## 2020-09-08 ENCOUNTER — TELEPHONE (OUTPATIENT)
Dept: SURGERY | Age: 50
End: 2020-09-08

## 2020-09-08 NOTE — TELEPHONE ENCOUNTER
Called patient to set up appt due to receiving referral [facility]. Forms scanned into media.      Electronically signed by Mumtaz Carey on 9/8/2020 at 11:08 AM

## 2022-02-09 ENCOUNTER — APPOINTMENT (OUTPATIENT)
Dept: ULTRASOUND IMAGING | Age: 52
DRG: 660 | End: 2022-02-09
Payer: COMMERCIAL

## 2022-02-09 ENCOUNTER — APPOINTMENT (OUTPATIENT)
Dept: GENERAL RADIOLOGY | Age: 52
DRG: 660 | End: 2022-02-09
Payer: COMMERCIAL

## 2022-02-09 ENCOUNTER — HOSPITAL ENCOUNTER (INPATIENT)
Age: 52
LOS: 3 days | Discharge: NURSING, REHAB OR LONG-TERM CARE | DRG: 660 | End: 2022-02-12
Attending: EMERGENCY MEDICINE | Admitting: INTERNAL MEDICINE
Payer: COMMERCIAL

## 2022-02-09 ENCOUNTER — APPOINTMENT (OUTPATIENT)
Dept: CT IMAGING | Age: 52
DRG: 660 | End: 2022-02-09
Payer: COMMERCIAL

## 2022-02-09 DIAGNOSIS — N30.00 ACUTE CYSTITIS WITHOUT HEMATURIA: ICD-10-CM

## 2022-02-09 DIAGNOSIS — N20.0 BILATERAL KIDNEY STONES: ICD-10-CM

## 2022-02-09 DIAGNOSIS — N17.9 ACUTE RENAL FAILURE, UNSPECIFIED ACUTE RENAL FAILURE TYPE (HCC): ICD-10-CM

## 2022-02-09 DIAGNOSIS — I10 ESSENTIAL HYPERTENSION: ICD-10-CM

## 2022-02-09 DIAGNOSIS — E86.0 DEHYDRATION: ICD-10-CM

## 2022-02-09 DIAGNOSIS — R41.82 ALTERED MENTAL STATUS, UNSPECIFIED ALTERED MENTAL STATUS TYPE: Primary | ICD-10-CM

## 2022-02-09 LAB
ALBUMIN SERPL-MCNC: 2.9 G/DL (ref 3.5–5.2)
ALP BLD-CCNC: 89 U/L (ref 35–104)
ALT SERPL-CCNC: 16 U/L (ref 0–32)
AMMONIA: 16 UMOL/L (ref 11–51)
ANION GAP SERPL CALCULATED.3IONS-SCNC: 17 MMOL/L (ref 7–16)
APTT: 29.2 SEC (ref 24.5–35.1)
AST SERPL-CCNC: 13 U/L (ref 0–31)
BACTERIA: ABNORMAL /HPF
BASOPHILS ABSOLUTE: 0.02 E9/L (ref 0–0.2)
BASOPHILS RELATIVE PERCENT: 0.3 % (ref 0–2)
BETA-HYDROXYBUTYRATE: 0.94 MMOL/L (ref 0.02–0.27)
BILIRUB SERPL-MCNC: <0.2 MG/DL (ref 0–1.2)
BILIRUBIN URINE: NEGATIVE
BLOOD, URINE: ABNORMAL
BUN BLDV-MCNC: 27 MG/DL (ref 6–20)
CALCIUM SERPL-MCNC: 9 MG/DL (ref 8.6–10.2)
CHLORIDE BLD-SCNC: 110 MMOL/L (ref 98–107)
CLARITY: ABNORMAL
CO2: 19 MMOL/L (ref 22–29)
COLOR: YELLOW
CREAT SERPL-MCNC: 3.5 MG/DL (ref 0.5–1)
CREATININE URINE: 59 MG/DL (ref 29–226)
EOSINOPHILS ABSOLUTE: 0.03 E9/L (ref 0.05–0.5)
EOSINOPHILS RELATIVE PERCENT: 0.4 % (ref 0–6)
EPITHELIAL CELLS, UA: ABNORMAL /HPF
GFR AFRICAN AMERICAN: 17
GFR NON-AFRICAN AMERICAN: 14 ML/MIN/1.73
GLUCOSE BLD-MCNC: 104 MG/DL (ref 74–99)
GLUCOSE URINE: NEGATIVE MG/DL
HCT VFR BLD CALC: 37.9 % (ref 34–48)
HEMOGLOBIN: 12.5 G/DL (ref 11.5–15.5)
IMMATURE GRANULOCYTES #: 0.09 E9/L
IMMATURE GRANULOCYTES %: 1.2 % (ref 0–5)
INR BLD: 1.2
KETONES, URINE: ABNORMAL MG/DL
LACTIC ACID, SEPSIS: 1.3 MMOL/L (ref 0.5–1.9)
LEUKOCYTE ESTERASE, URINE: ABNORMAL
LYMPHOCYTES ABSOLUTE: 1.47 E9/L (ref 1.5–4)
LYMPHOCYTES RELATIVE PERCENT: 20.2 % (ref 20–42)
MAGNESIUM: 1.8 MG/DL (ref 1.6–2.6)
MCH RBC QN AUTO: 32.2 PG (ref 26–35)
MCHC RBC AUTO-ENTMCNC: 33 % (ref 32–34.5)
MCV RBC AUTO: 97.7 FL (ref 80–99.9)
METER GLUCOSE: 111 MG/DL (ref 74–99)
MONOCYTES ABSOLUTE: 0.47 E9/L (ref 0.1–0.95)
MONOCYTES RELATIVE PERCENT: 6.4 % (ref 2–12)
NEUTROPHILS ABSOLUTE: 5.21 E9/L (ref 1.8–7.3)
NEUTROPHILS RELATIVE PERCENT: 71.5 % (ref 43–80)
NITRITE, URINE: NEGATIVE
PDW BLD-RTO: 11.6 FL (ref 11.5–15)
PH UA: 5.5 (ref 5–9)
PH VENOUS: 7.35 (ref 7.35–7.45)
PLATELET # BLD: 282 E9/L (ref 130–450)
PMV BLD AUTO: 9.7 FL (ref 7–12)
POTASSIUM SERPL-SCNC: 3.7 MMOL/L (ref 3.5–5)
PROTEIN UA: NEGATIVE MG/DL
PROTHROMBIN TIME: 13.6 SEC (ref 9.3–12.4)
RBC # BLD: 3.88 E12/L (ref 3.5–5.5)
RBC UA: ABNORMAL /HPF (ref 0–2)
SARS-COV-2, NAAT: NOT DETECTED
SODIUM BLD-SCNC: 146 MMOL/L (ref 132–146)
SODIUM URINE: 69 MMOL/L
SPECIFIC GRAVITY UA: 1.01 (ref 1–1.03)
TOTAL PROTEIN: 6.1 G/DL (ref 6.4–8.3)
TROPONIN, HIGH SENSITIVITY: 26 NG/L (ref 0–9)
UROBILINOGEN, URINE: 0.2 E.U./DL
WBC # BLD: 7.3 E9/L (ref 4.5–11.5)
WBC UA: >20 /HPF (ref 0–5)
YEAST: PRESENT /HPF

## 2022-02-09 PROCEDURE — 71045 X-RAY EXAM CHEST 1 VIEW: CPT

## 2022-02-09 PROCEDURE — 82962 GLUCOSE BLOOD TEST: CPT

## 2022-02-09 PROCEDURE — 36415 COLL VENOUS BLD VENIPUNCTURE: CPT

## 2022-02-09 PROCEDURE — 99285 EMERGENCY DEPT VISIT HI MDM: CPT

## 2022-02-09 PROCEDURE — 2580000003 HC RX 258: Performed by: EMERGENCY MEDICINE

## 2022-02-09 PROCEDURE — 82010 KETONE BODYS QUAN: CPT

## 2022-02-09 PROCEDURE — 81001 URINALYSIS AUTO W/SCOPE: CPT

## 2022-02-09 PROCEDURE — 82570 ASSAY OF URINE CREATININE: CPT

## 2022-02-09 PROCEDURE — 83735 ASSAY OF MAGNESIUM: CPT

## 2022-02-09 PROCEDURE — 93005 ELECTROCARDIOGRAM TRACING: CPT | Performed by: EMERGENCY MEDICINE

## 2022-02-09 PROCEDURE — 84484 ASSAY OF TROPONIN QUANT: CPT

## 2022-02-09 PROCEDURE — 2580000003 HC RX 258: Performed by: INTERNAL MEDICINE

## 2022-02-09 PROCEDURE — 83605 ASSAY OF LACTIC ACID: CPT

## 2022-02-09 PROCEDURE — 1200000000 HC SEMI PRIVATE

## 2022-02-09 PROCEDURE — 87635 SARS-COV-2 COVID-19 AMP PRB: CPT

## 2022-02-09 PROCEDURE — 80053 COMPREHEN METABOLIC PANEL: CPT

## 2022-02-09 PROCEDURE — 85025 COMPLETE CBC W/AUTO DIFF WBC: CPT

## 2022-02-09 PROCEDURE — 87088 URINE BACTERIA CULTURE: CPT

## 2022-02-09 PROCEDURE — 85730 THROMBOPLASTIN TIME PARTIAL: CPT

## 2022-02-09 PROCEDURE — 51702 INSERT TEMP BLADDER CATH: CPT

## 2022-02-09 PROCEDURE — 6360000002 HC RX W HCPCS: Performed by: EMERGENCY MEDICINE

## 2022-02-09 PROCEDURE — 96361 HYDRATE IV INFUSION ADD-ON: CPT

## 2022-02-09 PROCEDURE — 70450 CT HEAD/BRAIN W/O DYE: CPT

## 2022-02-09 PROCEDURE — 82800 BLOOD PH: CPT

## 2022-02-09 PROCEDURE — 96360 HYDRATION IV INFUSION INIT: CPT

## 2022-02-09 PROCEDURE — 87040 BLOOD CULTURE FOR BACTERIA: CPT

## 2022-02-09 PROCEDURE — 76770 US EXAM ABDO BACK WALL COMP: CPT

## 2022-02-09 PROCEDURE — 85610 PROTHROMBIN TIME: CPT

## 2022-02-09 PROCEDURE — 84300 ASSAY OF URINE SODIUM: CPT

## 2022-02-09 PROCEDURE — 82140 ASSAY OF AMMONIA: CPT

## 2022-02-09 PROCEDURE — 6360000002 HC RX W HCPCS: Performed by: INTERNAL MEDICINE

## 2022-02-09 RX ORDER — CALCIUM CARBONATE 200(500)MG
2 TABLET,CHEWABLE ORAL DAILY
Status: DISCONTINUED | OUTPATIENT
Start: 2022-02-09 | End: 2022-02-13 | Stop reason: HOSPADM

## 2022-02-09 RX ORDER — SERTRALINE HYDROCHLORIDE 25 MG/1
25 TABLET, FILM COATED ORAL DAILY
COMMUNITY

## 2022-02-09 RX ORDER — ASCORBIC ACID 500 MG
500 TABLET ORAL 2 TIMES DAILY
Status: DISCONTINUED | OUTPATIENT
Start: 2022-02-09 | End: 2022-02-13 | Stop reason: HOSPADM

## 2022-02-09 RX ORDER — VALPROIC ACID 250 MG/1
500 CAPSULE, LIQUID FILLED ORAL 3 TIMES DAILY
Status: ON HOLD | COMMUNITY
End: 2022-02-12 | Stop reason: HOSPADM

## 2022-02-09 RX ORDER — BISACODYL 10 MG
10 SUPPOSITORY, RECTAL RECTAL DAILY PRN
Status: DISCONTINUED | OUTPATIENT
Start: 2022-02-09 | End: 2022-02-13 | Stop reason: HOSPADM

## 2022-02-09 RX ORDER — 0.9 % SODIUM CHLORIDE 0.9 %
1000 INTRAVENOUS SOLUTION INTRAVENOUS ONCE
Status: COMPLETED | OUTPATIENT
Start: 2022-02-09 | End: 2022-02-09

## 2022-02-09 RX ORDER — FERROUS SULFATE 325(65) MG
325 TABLET ORAL
Status: DISCONTINUED | OUTPATIENT
Start: 2022-02-10 | End: 2022-02-13 | Stop reason: HOSPADM

## 2022-02-09 RX ORDER — AMMONIUM LACTATE 12 G/100G
LOTION TOPICAL PRN
COMMUNITY

## 2022-02-09 RX ORDER — MIDODRINE HYDROCHLORIDE 5 MG/1
5 TABLET ORAL 3 TIMES DAILY
Status: DISCONTINUED | OUTPATIENT
Start: 2022-02-09 | End: 2022-02-11

## 2022-02-09 RX ORDER — OXYCODONE HYDROCHLORIDE AND ACETAMINOPHEN 5; 325 MG/1; MG/1
1 TABLET ORAL EVERY 8 HOURS PRN
Status: ON HOLD | COMMUNITY
End: 2022-02-12 | Stop reason: HOSPADM

## 2022-02-09 RX ORDER — ZINC SULFATE 50(220)MG
50 CAPSULE ORAL DAILY
COMMUNITY

## 2022-02-09 RX ORDER — GABAPENTIN 100 MG/1
100 CAPSULE ORAL DAILY
Status: ON HOLD | COMMUNITY
End: 2022-02-12 | Stop reason: HOSPADM

## 2022-02-09 RX ORDER — SODIUM CHLORIDE 9 MG/ML
INJECTION, SOLUTION INTRAVENOUS CONTINUOUS
Status: DISCONTINUED | OUTPATIENT
Start: 2022-02-09 | End: 2022-02-10

## 2022-02-09 RX ORDER — POTASSIUM CHLORIDE 1.5 G/1.77G
20 POWDER, FOR SOLUTION ORAL DAILY
Status: ON HOLD | COMMUNITY
End: 2022-02-12 | Stop reason: HOSPADM

## 2022-02-09 RX ORDER — ZINC SULFATE 50(220)MG
50 CAPSULE ORAL DAILY
Status: DISCONTINUED | OUTPATIENT
Start: 2022-02-09 | End: 2022-02-13 | Stop reason: HOSPADM

## 2022-02-09 RX ORDER — ACETAMINOPHEN 160 MG
TABLET,DISINTEGRATING ORAL
Status: ON HOLD | COMMUNITY
End: 2022-02-12 | Stop reason: HOSPADM

## 2022-02-09 RX ORDER — BISACODYL 10 MG
10 SUPPOSITORY, RECTAL RECTAL DAILY PRN
COMMUNITY

## 2022-02-09 RX ORDER — CALCIUM CARBONATE 200(500)MG
2 TABLET,CHEWABLE ORAL DAILY
COMMUNITY

## 2022-02-09 RX ORDER — POLYETHYLENE GLYCOL 3350 17 G/17G
17 POWDER, FOR SOLUTION ORAL DAILY PRN
COMMUNITY

## 2022-02-09 RX ORDER — OMEPRAZOLE 40 MG/1
40 CAPSULE, DELAYED RELEASE ORAL DAILY
Status: ON HOLD | COMMUNITY
End: 2022-02-12 | Stop reason: HOSPADM

## 2022-02-09 RX ORDER — METOCLOPRAMIDE 10 MG/1
10 TABLET ORAL 4 TIMES DAILY
Status: DISCONTINUED | OUTPATIENT
Start: 2022-02-09 | End: 2022-02-13 | Stop reason: HOSPADM

## 2022-02-09 RX ORDER — DRONABINOL 2.5 MG/1
5 CAPSULE ORAL
Status: DISCONTINUED | OUTPATIENT
Start: 2022-02-10 | End: 2022-02-13 | Stop reason: HOSPADM

## 2022-02-09 RX ORDER — DRONABINOL 5 MG/1
5 CAPSULE ORAL
COMMUNITY
End: 2022-04-06 | Stop reason: ALTCHOICE

## 2022-02-09 RX ORDER — HYDRALAZINE HYDROCHLORIDE 20 MG/ML
5 INJECTION INTRAMUSCULAR; INTRAVENOUS ONCE
Status: DISCONTINUED | OUTPATIENT
Start: 2022-02-09 | End: 2022-02-13 | Stop reason: HOSPADM

## 2022-02-09 RX ORDER — ASCORBIC ACID 500 MG
500 TABLET ORAL 2 TIMES DAILY
COMMUNITY

## 2022-02-09 RX ORDER — AMMONIUM LACTATE 12 G/100G
LOTION TOPICAL PRN
Status: DISCONTINUED | OUTPATIENT
Start: 2022-02-09 | End: 2022-02-13 | Stop reason: HOSPADM

## 2022-02-09 RX ORDER — MIRTAZAPINE 15 MG/1
15 TABLET, FILM COATED ORAL NIGHTLY
Status: ON HOLD | COMMUNITY
End: 2022-02-12 | Stop reason: HOSPADM

## 2022-02-09 RX ORDER — DIVALPROEX SODIUM 250 MG/1
250 TABLET, DELAYED RELEASE ORAL 3 TIMES DAILY
Status: DISCONTINUED | OUTPATIENT
Start: 2022-02-09 | End: 2022-02-13 | Stop reason: HOSPADM

## 2022-02-09 RX ORDER — ACETAMINOPHEN 325 MG/1
650 TABLET ORAL EVERY 4 HOURS PRN
COMMUNITY

## 2022-02-09 RX ORDER — ATORVASTATIN CALCIUM 40 MG/1
40 TABLET, FILM COATED ORAL DAILY
Status: DISCONTINUED | OUTPATIENT
Start: 2022-02-09 | End: 2022-02-13 | Stop reason: HOSPADM

## 2022-02-09 RX ORDER — SENNA PLUS 8.6 MG/1
1 TABLET ORAL DAILY
Status: ON HOLD | COMMUNITY
End: 2022-02-12 | Stop reason: HOSPADM

## 2022-02-09 RX ORDER — POLYETHYLENE GLYCOL 3350 17 G/17G
17 POWDER, FOR SOLUTION ORAL DAILY PRN
Status: DISCONTINUED | OUTPATIENT
Start: 2022-02-09 | End: 2022-02-13 | Stop reason: HOSPADM

## 2022-02-09 RX ORDER — DOCUSATE SODIUM 100 MG/1
100 CAPSULE, LIQUID FILLED ORAL 2 TIMES DAILY PRN
Status: DISCONTINUED | OUTPATIENT
Start: 2022-02-09 | End: 2022-02-13 | Stop reason: HOSPADM

## 2022-02-09 RX ORDER — ACETAMINOPHEN 325 MG/1
650 TABLET ORAL EVERY 6 HOURS PRN
Status: DISCONTINUED | OUTPATIENT
Start: 2022-02-09 | End: 2022-02-13 | Stop reason: HOSPADM

## 2022-02-09 RX ORDER — M-VIT,TX,IRON,MINS/CALC/FOLIC 27MG-0.4MG
1 TABLET ORAL DAILY
COMMUNITY

## 2022-02-09 RX ORDER — FAMOTIDINE 40 MG/1
40 TABLET, FILM COATED ORAL 2 TIMES DAILY PRN
Status: ON HOLD | COMMUNITY
End: 2022-02-12 | Stop reason: HOSPADM

## 2022-02-09 RX ORDER — BACLOFEN 10 MG/1
5 TABLET ORAL 3 TIMES DAILY
Status: ON HOLD | COMMUNITY
End: 2022-02-12 | Stop reason: HOSPADM

## 2022-02-09 RX ORDER — MECOBALAMIN 5000 MCG
5 TABLET,DISINTEGRATING ORAL NIGHTLY
COMMUNITY

## 2022-02-09 RX ORDER — DULOXETIN HYDROCHLORIDE 60 MG/1
120 CAPSULE, DELAYED RELEASE ORAL DAILY
Status: DISCONTINUED | OUTPATIENT
Start: 2022-02-09 | End: 2022-02-13 | Stop reason: HOSPADM

## 2022-02-09 RX ORDER — MIDODRINE HYDROCHLORIDE 5 MG/1
5 TABLET ORAL 3 TIMES DAILY
Status: ON HOLD | COMMUNITY
End: 2022-02-12 | Stop reason: HOSPADM

## 2022-02-09 RX ORDER — FERROUS SULFATE 325(65) MG
325 TABLET ORAL
COMMUNITY

## 2022-02-09 RX ORDER — M-VIT,TX,IRON,MINS/CALC/FOLIC 27MG-0.4MG
1 TABLET ORAL DAILY
Status: DISCONTINUED | OUTPATIENT
Start: 2022-02-09 | End: 2022-02-13 | Stop reason: HOSPADM

## 2022-02-09 RX ORDER — MECOBALAMIN 5000 MCG
5 TABLET,DISINTEGRATING ORAL NIGHTLY
Status: DISCONTINUED | OUTPATIENT
Start: 2022-02-09 | End: 2022-02-13 | Stop reason: HOSPADM

## 2022-02-09 RX ADMIN — WATER 2000 MG: 1 INJECTION INTRAMUSCULAR; INTRAVENOUS; SUBCUTANEOUS at 16:09

## 2022-02-09 RX ADMIN — WATER 1000 MG: 1 INJECTION INTRAMUSCULAR; INTRAVENOUS; SUBCUTANEOUS at 21:18

## 2022-02-09 RX ADMIN — SODIUM CHLORIDE 1000 ML: 9 INJECTION, SOLUTION INTRAVENOUS at 14:03

## 2022-02-09 RX ADMIN — SODIUM CHLORIDE 1000 ML: 9 INJECTION, SOLUTION INTRAVENOUS at 12:30

## 2022-02-09 RX ADMIN — SODIUM CHLORIDE: 9 INJECTION, SOLUTION INTRAVENOUS at 21:12

## 2022-02-09 ASSESSMENT — PAIN SCALES - GENERAL: PAINLEVEL_OUTOF10: 0

## 2022-02-09 NOTE — ED PROVIDER NOTES
Chief complaint:  Altered mental status    HPI history provided by report  Report is apparently only from EMS, nursing home did not call report but sent the patient in. Has a history of stroke with persistent left-sided deficits. The report from EMS is that patient was altered at 930 this morning. Not a lot of other information provided. No reported falls or injuries. No reported illness or fevers. Patient self is not offering any information. Nursing staff has contacted the nursing home and received report from nursing staff there, patient is normally somewhat confused with orientation A&O x2-3 with periods of confusion at times at baseline with persistent left-sided deficits from her previous stroke. Her last known normal time was some unspecified time last evening. Review of Systems   Unable to perform ROS: Mental status change        Physical Exam  Vitals and nursing note reviewed. Constitutional:       General: She is not in acute distress. Appearance: She is well-developed. She is not ill-appearing, toxic-appearing or diaphoretic. HENT:      Head: Normocephalic and atraumatic. Comments: No sign of acute head or face injury     Mouth/Throat:      Comments: Dry lips, dry oral mucosa  Eyes:      General: No scleral icterus. Pupils: Pupils are equal, round, and reactive to light. Cardiovascular:      Rate and Rhythm: Normal rate and regular rhythm. Heart sounds: Normal heart sounds. No murmur heard. Pulmonary:      Effort: Pulmonary effort is normal. No respiratory distress. Breath sounds: Normal breath sounds. No stridor, decreased air movement or transmitted upper airway sounds. No decreased breath sounds, wheezing, rhonchi or rales. Chest:      Chest wall: No tenderness. Abdominal:      General: Bowel sounds are normal. There is no distension. Palpations: Abdomen is soft. Tenderness: There is no abdominal tenderness.  There is no right CVA tenderness, left CVA tenderness, guarding or rebound. Musculoskeletal:         General: No swelling, tenderness, deformity or signs of injury. Cervical back: Normal range of motion and neck supple. Right lower leg: No edema. Left lower leg: No edema. Comments: Arms and legs show no sign of acute bony or joint injury. She has no pitting pretibial edema or calf pain or unilateral leg swelling. Skin:     General: Skin is warm and dry. Coloration: Skin is not cyanotic, jaundiced, mottled or pale. Findings: No erythema or rash. Neurological:      Mental Status: She is alert. GCS: GCS eye subscore is 4. GCS verbal subscore is 4. GCS motor subscore is 6. Cranial Nerves: No facial asymmetry. Motor: No seizure activity. Comments: Patient is awake, looking around. She answers to her name and knows her name and keeps repeating it during the exam however is able to state yes or no when asked directly if she is in pain or having trouble breathing and denies both. She then continues to repeat her first name. She is looking around and will actually state okay once in a while when talked to and it appears to be an appropriate response. She is moving her extremities on her own and has no facial droop or slurred speech.           Procedures     [facility]     ED Course as of 02/09/22 1512 Wed Feb 09, 2022 1512 Case discussed with Dr. Gideon Kinney, detailed overview given, he knows this patient and will admit her. [NC]      ED Course User Index  [NC] Jeison Hirsch DO     EKG Interpretation    Interpreted by emergency department physician    Rhythm: normal sinus   Rate: 74  Axis: normal  Ectopy: none  Conduction: normal  ST Segments: no acute change  T Waves: no acute change  Q Waves: none    Clinical Impression: no acute changes    Jeison Hirsch DO       ED Course as of 02/09/22 1513 Wed Feb 09, 2022 1512 Case discussed with Dr. Gideon Kinney, detailed overview given, he knows this patient and will admit her. [NC]      ED Course User Index  [NC] Jennifer Dotson, DO       --------------------------------------------- PAST HISTORY ---------------------------------------------  Past Medical History:  has a past medical history of Cerebral artery occlusion with cerebral infarction ([facility] [address] Hypertension, and Panic attacks. Past Surgical History:  has a past surgical history that includes Foot surgery; laparotomy; Colonoscopy; Endoscopy, colon, diagnostic; Foot surgery (Left, 3/27/13); and Upper gastrointestinal endoscopy (05/20/2014). Social History:  reports that she has been smoking cigarettes. She has a 20.00 pack-year smoking history. She has never used smokeless tobacco. She reports that she does not drink alcohol and does not use drugs. Family History: family history is not on file. The patients home medications have been reviewed.     Allergies: Bactrim [sulfamethoxazole-trimethoprim]    -------------------------------------------------- RESULTS -------------------------------------------------    LABS:  Results for orders placed or performed during the hospital encounter of 02/09/22   COVID-19, Rapid    Specimen: Nasopharyngeal Swab   Result Value Ref Range    SARS-CoV-2, NAAT Not Detected Not Detected   Lactate, Sepsis   Result Value Ref Range    Lactic Acid, Sepsis 1.3 0.5 - 1.9 mmol/L   CBC Auto Differential   Result Value Ref Range    WBC 7.3 4.5 - 11.5 E9/L    RBC 3.88 3.50 - 5.50 E12/L    Hemoglobin 12.5 11.5 - 15.5 g/dL    Hematocrit 37.9 34.0 - 48.0 %    MCV 97.7 80.0 - 99.9 fL    MCH 32.2 26.0 - 35.0 pg    MCHC 33.0 32.0 - 34.5 %    RDW 11.6 11.5 - 15.0 fL    Platelets 801 376 - 105 E9/L    MPV 9.7 7.0 - 12.0 fL    Neutrophils % 71.5 43.0 - 80.0 %    Immature Granulocytes % 1.2 0.0 - 5.0 %    Lymphocytes % 20.2 20.0 - 42.0 %    Monocytes % 6.4 2.0 - 12.0 %    Eosinophils % 0.4 0.0 - 6.0 %    Basophils % 0.3 0.0 - 2.0 %    Neutrophils Absolute 5.21 1.80 - 7.30 E9/L Immature Granulocytes # 0.09 E9/L    Lymphocytes Absolute 1.47 (L) 1.50 - 4.00 E9/L    Monocytes Absolute 0.47 0.10 - 0.95 E9/L    Eosinophils Absolute 0.03 (L) 0.05 - 0.50 E9/L    Basophils Absolute 0.02 0.00 - 0.20 E9/L   Comprehensive Metabolic Panel   Result Value Ref Range    Sodium 146 132 - 146 mmol/L    Potassium 3.7 3.5 - 5.0 mmol/L    Chloride 110 (H) 98 - 107 mmol/L    CO2 19 (L) 22 - 29 mmol/L    Anion Gap 17 (H) 7 - 16 mmol/L    Glucose 104 (H) 74 - 99 mg/dL    BUN 27 (H) 6 - 20 mg/dL    CREATININE 3.5 (H) 0.5 - 1.0 mg/dL    GFR Non-African American 14 >=60 mL/min/1.73    GFR African American 17     Calcium 9.0 8.6 - 10.2 mg/dL    Total Protein 6.1 (L) 6.4 - 8.3 g/dL    Albumin 2.9 (L) 3.5 - 5.2 g/dL    Total Bilirubin <0.2 0.0 - 1.2 mg/dL    Alkaline Phosphatase 89 35 - 104 U/L    ALT 16 0 - 32 U/L    AST 13 0 - 31 U/L   APTT   Result Value Ref Range    aPTT 29.2 24.5 - 35.1 sec   Protime-INR   Result Value Ref Range    Protime 13.6 (H) 9.3 - 12.4 sec    INR 1.2    pH, venous   Result Value Ref Range    pH, Hussein 7.35 7.35 - 7.45   Beta-Hydroxybutyrate   Result Value Ref Range    Beta-Hydroxybutyrate 0.94 (H) 0.02 - 0.27 mmol/L   Troponin   Result Value Ref Range    Troponin, High Sensitivity 26 (H) 0 - 9 ng/L   Urinalysis   Result Value Ref Range    Color, UA Yellow Straw/Yellow    Clarity, UA CLOUDY (A) Clear    Glucose, Ur Negative Negative mg/dL    Bilirubin Urine Negative Negative    Ketones, Urine TRACE (A) Negative mg/dL    Specific Gravity, UA 1.015 1.005 - 1.030    Blood, Urine SMALL (A) Negative    pH, UA 5.5 5.0 - 9.0    Protein, UA Negative Negative mg/dL    Urobilinogen, Urine 0.2 <2.0 E.U./dL    Nitrite, Urine Negative Negative    Leukocyte Esterase, Urine MODERATE (A) Negative   Ammonia   Result Value Ref Range    Ammonia 16.0 11.0 - 51.0 umol/L   Magnesium   Result Value Ref Range    Magnesium 1.8 1.6 - 2.6 mg/dL   Microscopic Urinalysis   Result Value Ref Range    WBC, UA >20 (A) 0 - 5 /HPF    RBC, UA NONE 0 - 2 /HPF    Epithelial Cells, UA MANY /HPF    Bacteria, UA FEW (A) None Seen /HPF    Yeast, UA Present (A) None Seen /HPF   POCT Glucose   Result Value Ref Range    Meter Glucose 111 (H) 74 - 99 mg/dL   EKG 12 Lead   Result Value Ref Range    Ventricular Rate 74 BPM    Atrial Rate 74 BPM    P-R Interval 142 ms    QRS Duration 78 ms    Q-T Interval 388 ms    QTc Calculation (Bazett) 430 ms    P Axis 12 degrees    R Axis -14 degrees    T Axis 9 degrees       RADIOLOGY:  CT HEAD WO CONTRAST   Final Result   1. There is no acute intracranial hemorrhage   2. Significant encephalomalacia is seen within the right cerebral hemisphere   likely secondary to an old infarct/hemorrhagic  infarct. 3. Previous large right craniotomy defect. XR CHEST PORTABLE   Final Result   No active cardiopulmonary disease.               ------------------------- NURSING NOTES AND VITALS REVIEWED ---------------------------  Date / Time Roomed:  2/9/2022 11:21 AM  ED Bed Assignment:  07/07    The nursing notes within the ED encounter and vital signs as below have been reviewed.      Patient Vitals for the past 24 hrs:   BP Temp Pulse Resp SpO2 Weight   02/09/22 1431   78 16 93 %    02/09/22 1346 (!) 154/93   15     02/09/22 1333 (!) 155/113  87 19     02/09/22 1152      127 lb 8 oz (57.8 kg)   02/09/22 1151 (!) 155/105 98 °F (36.7 °C) 70 13 99 % 150 lb (68 kg)       Oxygen Saturation Interpretation: Normal    ------------------------------------------ PROGRESS NOTES ------------------------------------------  Re-evaluation(s):  Time: 1500  Patients symptoms show no change  Repeat physical examination is not changed      --------------------------------- ADDITIONAL PROVIDER NOTES ---------------------------------    This patient's ED course included: a personal history and physicial examination, re-evaluation prior to disposition, multiple bedside re-evaluations, IV medications and cardiac monitoring    This patient has remained hemodynamically stable during their ED course. Diagnosis:  1. Altered mental status, unspecified altered mental status type    2. Dehydration    3. Acute renal failure, unspecified acute renal failure type ([address]    4. Essential hypertension    5. Acute cystitis without hematuria        Disposition:  Patient's disposition: Admit to telemetry  Patient's condition is stable.          [address] [facility],   02/09/22 1513

## 2022-02-09 NOTE — ED NOTES
Bed: 07  Expected date:   Expected time:   Means of arrival:   Comments:  taiwo Ramírez RN  02/09/22 1128

## 2022-02-10 ENCOUNTER — APPOINTMENT (OUTPATIENT)
Dept: CT IMAGING | Age: 52
DRG: 660 | End: 2022-02-10
Payer: COMMERCIAL

## 2022-02-10 LAB
ALBUMIN SERPL-MCNC: 2.7 G/DL (ref 3.5–5.2)
ALP BLD-CCNC: 76 U/L (ref 35–104)
ALT SERPL-CCNC: 9 U/L (ref 0–32)
ANION GAP SERPL CALCULATED.3IONS-SCNC: 12 MMOL/L (ref 7–16)
ANION GAP SERPL CALCULATED.3IONS-SCNC: 15 MMOL/L (ref 7–16)
AST SERPL-CCNC: 11 U/L (ref 0–31)
BASOPHILS ABSOLUTE: 0.02 E9/L (ref 0–0.2)
BASOPHILS RELATIVE PERCENT: 0.4 % (ref 0–2)
BILIRUB SERPL-MCNC: <0.2 MG/DL (ref 0–1.2)
BUN BLDV-MCNC: 14 MG/DL (ref 6–20)
BUN BLDV-MCNC: 22 MG/DL (ref 6–20)
CALCIUM SERPL-MCNC: 8.2 MG/DL (ref 8.6–10.2)
CALCIUM SERPL-MCNC: 8.5 MG/DL (ref 8.6–10.2)
CHLORIDE BLD-SCNC: 117 MMOL/L (ref 98–107)
CHLORIDE BLD-SCNC: 117 MMOL/L (ref 98–107)
CO2: 16 MMOL/L (ref 22–29)
CO2: 18 MMOL/L (ref 22–29)
CREAT SERPL-MCNC: 0.8 MG/DL (ref 0.5–1)
CREAT SERPL-MCNC: 2.2 MG/DL (ref 0.5–1)
EKG ATRIAL RATE: 74 BPM
EKG P AXIS: 12 DEGREES
EKG P-R INTERVAL: 142 MS
EKG Q-T INTERVAL: 388 MS
EKG QRS DURATION: 78 MS
EKG QTC CALCULATION (BAZETT): 430 MS
EKG R AXIS: -14 DEGREES
EKG T AXIS: 9 DEGREES
EKG VENTRICULAR RATE: 74 BPM
EOSINOPHILS ABSOLUTE: 0.1 E9/L (ref 0.05–0.5)
EOSINOPHILS RELATIVE PERCENT: 1.8 % (ref 0–6)
GFR AFRICAN AMERICAN: 28
GFR AFRICAN AMERICAN: >60
GFR NON-AFRICAN AMERICAN: 23 ML/MIN/1.73
GFR NON-AFRICAN AMERICAN: >60 ML/MIN/1.73
GLUCOSE BLD-MCNC: 116 MG/DL (ref 74–99)
GLUCOSE BLD-MCNC: 73 MG/DL (ref 74–99)
HCT VFR BLD CALC: 38.2 % (ref 34–48)
HEMOGLOBIN: 12.7 G/DL (ref 11.5–15.5)
IMMATURE GRANULOCYTES #: 0.07 E9/L
IMMATURE GRANULOCYTES %: 1.2 % (ref 0–5)
LYMPHOCYTES ABSOLUTE: 1.15 E9/L (ref 1.5–4)
LYMPHOCYTES RELATIVE PERCENT: 20.4 % (ref 20–42)
MCH RBC QN AUTO: 32.3 PG (ref 26–35)
MCHC RBC AUTO-ENTMCNC: 33.2 % (ref 32–34.5)
MCV RBC AUTO: 97.2 FL (ref 80–99.9)
MONOCYTES ABSOLUTE: 0.41 E9/L (ref 0.1–0.95)
MONOCYTES RELATIVE PERCENT: 7.3 % (ref 2–12)
NEUTROPHILS ABSOLUTE: 3.9 E9/L (ref 1.8–7.3)
NEUTROPHILS RELATIVE PERCENT: 68.9 % (ref 43–80)
PDW BLD-RTO: 11.8 FL (ref 11.5–15)
PLATELET # BLD: 240 E9/L (ref 130–450)
PMV BLD AUTO: 9.6 FL (ref 7–12)
POTASSIUM SERPL-SCNC: 2.8 MMOL/L (ref 3.5–5)
POTASSIUM SERPL-SCNC: 3.6 MMOL/L (ref 3.5–5)
RBC # BLD: 3.93 E12/L (ref 3.5–5.5)
SODIUM BLD-SCNC: 147 MMOL/L (ref 132–146)
SODIUM BLD-SCNC: 148 MMOL/L (ref 132–146)
TOTAL PROTEIN: 5.7 G/DL (ref 6.4–8.3)
WBC # BLD: 5.7 E9/L (ref 4.5–11.5)

## 2022-02-10 PROCEDURE — 93010 ELECTROCARDIOGRAM REPORT: CPT | Performed by: INTERNAL MEDICINE

## 2022-02-10 PROCEDURE — 6370000000 HC RX 637 (ALT 250 FOR IP): Performed by: INTERNAL MEDICINE

## 2022-02-10 PROCEDURE — 80048 BASIC METABOLIC PNL TOTAL CA: CPT

## 2022-02-10 PROCEDURE — 97110 THERAPEUTIC EXERCISES: CPT

## 2022-02-10 PROCEDURE — 74176 CT ABD & PELVIS W/O CONTRAST: CPT

## 2022-02-10 PROCEDURE — 6360000002 HC RX W HCPCS: Performed by: INTERNAL MEDICINE

## 2022-02-10 PROCEDURE — 97161 PT EVAL LOW COMPLEX 20 MIN: CPT

## 2022-02-10 PROCEDURE — 97165 OT EVAL LOW COMPLEX 30 MIN: CPT

## 2022-02-10 PROCEDURE — 80053 COMPREHEN METABOLIC PANEL: CPT

## 2022-02-10 PROCEDURE — 1200000000 HC SEMI PRIVATE

## 2022-02-10 PROCEDURE — 85025 COMPLETE CBC W/AUTO DIFF WBC: CPT

## 2022-02-10 PROCEDURE — 92610 EVALUATE SWALLOWING FUNCTION: CPT

## 2022-02-10 PROCEDURE — 2580000003 HC RX 258: Performed by: INTERNAL MEDICINE

## 2022-02-10 PROCEDURE — 36415 COLL VENOUS BLD VENIPUNCTURE: CPT

## 2022-02-10 RX ORDER — SODIUM BICARBONATE 650 MG/1
1300 TABLET ORAL 3 TIMES DAILY
Status: DISCONTINUED | OUTPATIENT
Start: 2022-02-10 | End: 2022-02-13 | Stop reason: HOSPADM

## 2022-02-10 RX ORDER — DEXTROSE AND SODIUM CHLORIDE 5; .2 G/100ML; G/100ML
INJECTION, SOLUTION INTRAVENOUS CONTINUOUS
Status: DISCONTINUED | OUTPATIENT
Start: 2022-02-10 | End: 2022-02-11

## 2022-02-10 RX ADMIN — SODIUM BICARBONATE 650 MG TABLET 1300 MG: at 20:58

## 2022-02-10 RX ADMIN — SODIUM CHLORIDE: 9 INJECTION, SOLUTION INTRAVENOUS at 06:08

## 2022-02-10 RX ADMIN — WATER 1000 MG: 1 INJECTION INTRAMUSCULAR; INTRAVENOUS; SUBCUTANEOUS at 20:58

## 2022-02-10 RX ADMIN — SERTRALINE 25 MG: 50 TABLET, FILM COATED ORAL at 08:13

## 2022-02-10 RX ADMIN — Medication 5 MG: at 20:58

## 2022-02-10 RX ADMIN — METOCLOPRAMIDE 10 MG: 10 TABLET ORAL at 08:14

## 2022-02-10 RX ADMIN — DEXTROSE AND SODIUM CHLORIDE: 5; 200 INJECTION, SOLUTION INTRAVENOUS at 09:51

## 2022-02-10 RX ADMIN — METOCLOPRAMIDE 10 MG: 10 TABLET ORAL at 20:58

## 2022-02-10 RX ADMIN — OXYCODONE HYDROCHLORIDE AND ACETAMINOPHEN 500 MG: 500 TABLET ORAL at 08:13

## 2022-02-10 RX ADMIN — OXYCODONE HYDROCHLORIDE AND ACETAMINOPHEN 500 MG: 500 TABLET ORAL at 20:58

## 2022-02-10 RX ADMIN — MULTIPLE VITAMINS W/ MINERALS TAB 1 TABLET: TAB at 08:13

## 2022-02-10 RX ADMIN — SODIUM BICARBONATE 650 MG TABLET 1300 MG: at 14:46

## 2022-02-10 RX ADMIN — CALCIUM CARBONATE (ANTACID) CHEW TAB 500 MG 1000 MG: 500 CHEW TAB at 08:13

## 2022-02-10 RX ADMIN — FERROUS SULFATE TAB 325 MG (65 MG ELEMENTAL FE) 325 MG: 325 (65 FE) TAB at 08:13

## 2022-02-10 RX ADMIN — MIDODRINE HYDROCHLORIDE 5 MG: 5 TABLET ORAL at 08:13

## 2022-02-10 RX ADMIN — ATORVASTATIN CALCIUM 40 MG: 40 TABLET, FILM COATED ORAL at 08:13

## 2022-02-10 RX ADMIN — Medication 2500 UNITS: at 08:15

## 2022-02-10 RX ADMIN — ZINC SULFATE 220 MG (50 MG) CAPSULE 50 MG: CAPSULE at 08:13

## 2022-02-10 RX ADMIN — DRONABINOL 5 MG: 2.5 CAPSULE ORAL at 16:35

## 2022-02-10 ASSESSMENT — PAIN SCALES - GENERAL
PAINLEVEL_OUTOF10: 0
PAINLEVEL_OUTOF10: 0

## 2022-02-10 NOTE — CONSULTS
2/10/2022 9:53 AM  Service: Urology  Group: [facility] (Jabier/Tabatha/Pat)    Shama Blackman  [id number]     Chief Complaint:    Hydronephrosis     History of Present Illness: The patient is a 46 y.o. female patient who ended to the hospital yesterday from a nursing facility for evaluation of altered mental status. She was admitted for evaluation of her altered mental status, dehydration, and acute renal failure. She does have a history of CVA with left-sided weakness. She does answer some questions but some things are difficult to obtain secondary to expressive aphasia. When asked if she has any pain she does reply \"No pain. \"  She had a renal ultrasound performed that showed mild bilateral hydronephrosis. Her creatinine has improved from 3.5-2.2 with IV fluids. Feliz catheter was inserted in the emergency department for unknown output.       Past Medical History:   Diagnosis Date    Cerebral artery occlusion with cerebral infarction ([facility] [address]     Hypertension     Panic attacks          Past Surgical History:   Procedure Laterality Date    COLONOSCOPY      ENDOSCOPY, COLON, DIAGNOSTIC      FOOT SURGERY      right toes    FOOT SURGERY Left 3/27/13    correction 4th, 5th toes left foot    LAPAROTOMY      UPPER GASTROINTESTINAL ENDOSCOPY  05/20/2014       Medications Prior to Admission:    Medications Prior to Admission: acetaminophen (TYLENOL) 325 MG tablet, Take 650 mg by mouth every 6 hours as needed for Pain  vitamin C (ASCORBIC ACID) 500 MG tablet, Take 500 mg by mouth 2 times daily  baclofen (LIORESAL) 10 MG tablet, Take 5 mg by mouth 3 times daily  calcium carbonate (TUMS) 500 MG chewable tablet, Take 2 tablets by mouth daily  bisacodyl (DULCOLAX) 10 MG suppository, Place 10 mg rectally daily as needed for Constipation  Cholecalciferol (VITAMIN D3) 50 MCG (2000 UT) CAPS, Take by mouth  ferrous sulfate (IRON 325) 325 (65 Fe) MG tablet, Take 325 mg by mouth daily (with breakfast)  gabapentin (NEURONTIN) 100 MG capsule, Take 100 mg by mouth daily. ammonium lactate (LAC-HYDRIN) 12 % lotion, Apply topically as needed for Dry Skin Apply topically as needed. dronabinol (MARINOL) 5 MG capsule, Take 5 mg by mouth 2 times daily (before meals). melatonin 5 MG TBDP disintegrating tablet, Take 5 mg by mouth nightly  midodrine (PROAMATINE) 5 MG tablet, Take 5 mg by mouth 3 times daily  polyethylene glycol (GLYCOLAX) 17 g packet, Take 17 g by mouth daily as needed for Constipation  Multiple Vitamins-Minerals (THERAPEUTIC MULTIVITAMIN-MINERALS) tablet, Take 1 tablet by mouth daily  omeprazole (PRILOSEC) 40 MG delayed release capsule, Take 40 mg by mouth daily  oxyCODONE-acetaminophen (PERCOCET) 5-325 MG per tablet, Take 1 tablet by mouth every 8 hours as needed for Pain.  famotidine (PEPCID) 40 MG tablet, Take 40 mg by mouth 2 times daily as needed  potassium chloride (KLOR-CON) 20 MEQ packet, Take 20 mEq by mouth daily  mirtazapine (REMERON) 15 MG tablet, Take 15 mg by mouth nightly  senna (SENOKOT) 8.6 MG tablet, Take 1 tablet by mouth daily  sertraline (ZOLOFT) 25 MG tablet, Take 25 mg by mouth daily  valproic acid (DEPAKENE) 250 MG capsule, Take 500 mg by mouth 3 times daily  zinc sulfate (ZINCATE) 220 (50 Zn) MG capsule, Take 50 mg by mouth daily  metoclopramide (REGLAN) 10 MG tablet, Take 1 tablet by mouth 4 times daily  docusate sodium (COLACE) 100 MG capsule, Take 1 capsule by mouth 2 times daily  divalproex (DEPAKOTE) 250 MG DR tablet, Take 250 mg by mouth 3 times daily  atorvastatin (LIPITOR) 40 MG tablet, Take 40 mg by mouth daily  ketorolac (TORADOL) 10 MG tablet, Take 1 tablet by mouth every 6 hours as needed for Pain  DULoxetine (CYMBALTA) 60 MG capsule, Take 120 mg by mouth daily   Ranitidine HCl (RANITIDINE 75 PO), Take by mouth  LOSARTAN POTASSIUM PO, Take  by mouth. MedroxyPROGESTERone Acetate (DEPO-PROVERA IM), Inject 1 mg into the muscle.  Every three months    Allergies:    Bactrim [sulfamethoxazole-trimethoprim]    Social History:    reports that she has been smoking cigarettes. She has a 20.00 pack-year smoking history. She has never used smokeless tobacco. She reports that she does not drink alcohol and does not use drugs. Family History:   Non-contributory to this Urological problem  family history is not on file. Review of Systems:  Constitutional: No fever or chills   Respiratory: negative for cough and hemoptysis  Cardiovascular: negative for chest pain and dyspnea  Gastrointestinal: negative for abdominal pain, diarrhea, nausea and vomiting   Derm: negative for rash and skin lesion(s)  Neurological: Previous CVA with left-sided deficits  Musculoskeletal: Negative    Psychiatric: Negative   : As above in the HPI, otherwise negative  All other reviews are negative    Physical Exam:     Vitals:  BP (!) 147/103   Pulse 78   Temp 98.5 °F (36.9 °C) (Oral)   Resp 16   Wt 127 lb 8 oz (57.8 kg)   SpO2 98%   BMI 23.32 kg/m²     General: Eyes closed, opens to verbal stimuli, answers questions, has some periods of expressive aphasia  HEENT:  Normocephalic, atraumatic. Lungs:  Respirations symmetric and non-labored. Abdomen:  soft, nontender, no masses  Extremities:  No clubbing, cyanosis, or edema  Skin:  Warm and dry, no open lesions or rashes  Neuro: Left-sided weakness  Rectal: deferred  Genitourinary:  Feliz catheter draining marisela urine    Labs:     Recent Labs     02/09/22  1135 02/10/22  0616   WBC 7.3 5.7   RBC 3.88 3.93   HGB 12.5 12.7   HCT 37.9 38.2   MCV 97.7 97.2   MCH 32.2 32.3   MCHC 33.0 33.2   RDW 11.6 11.8    240   MPV 9.7 9.6         Recent Labs     02/09/22  1135 02/10/22  0616   CREATININE 3.5* 2.2*       No results found for: PSA    Imaging:   Narrative   EXAMINATION:   RETROPERITONEAL ULTRASOUND OF THE KIDNEYS AND URINARY BLADDER       2/9/2022       COMPARISON:   None       HISTORY:   ORDERING SYSTEM PROVIDED HISTORY: AMBROCIO   TECHNOLOGIST PROVIDED HISTORY:       Reason for exam:->AMBROCIO   What reading provider will be dictating this exam?->CRC       FINDINGS:   The right kidney measures 10.1 x 6.1 x 6.0 cm.  Left kidney measures 10.5 x   5.2 x 5.2 cm.       There is mild right hydronephrosis.  There may be minimal left hydronephrosis.       Bladder appears unremarkable.  Bilateral ureteral jets visualized.           Impression   Mild right and minimal left hydronephrosis.       Bilateral ureteral jets visualized within the bladder.  I cannot confirm   obstructive uropathy. Assessment/plan:  Bilateral hydronephrosis   AMBROCIO   ? Urinary Retention     Cont to watch the creatinine, improving with IVFs  Feliz was inserted in ER, ? retention   Urine culture pending  Antibiotics per primary  Renal ultrasound reviewed  Will obtain CT abdomen pelvis to further evaluate for any obstructive uropathy   We will do voiding trial prior to discharge. She is at risk for neurogenic bladder with CVA history.   Bladder scans will need to be monitored closely  We will follow        Electronically signed by IKE Vieira CNP on 2/10/2022 at 9:53 AM

## 2022-02-10 NOTE — CARE COORDINATION
Ss note:2/10/2022.9:37 AM Neg covid on 2-9-2022. Pt presents from [facility] Per liaison [facility] facility can accept back at discharge 9 Deirdre Rodriguez made contact with pts primary contact, her uncle Loren Feldman home number [phone], cell [phone]. Osbaldo Milian relays that pt has been at [address] [place] [address] for a few months and he, his wife Shayy Whittaker and his daughter Indio Lacey [phone] would like a referral to [facility] as pt has been there in the past. Explained to Osbaldo Milian that referral can be made and if new SNF unable to accept, pt would need to return to [facility] and facility could assist them with securing a new SNF. Osbaldo Milian voiced understanding. Referral made to Breanna Arango at [facility], will await chart review and acceptance, need therapy evals, and [facility]. SW will follow.  KAROLINA Klein

## 2022-02-10 NOTE — PROGRESS NOTES
activities   -Sitting Balance: Incorporate reaching activities to activate trunk muscles , Hands on support to maintain midline , Facilitate active trunk muscle engagement  and Facilitate postural control in all planes   -Standing Balance: Perform strengthening exercises in standing to promote motor control with or without upper extremity support   -Transfers: Provide instruction on proper hand and foot position for adequate transfer of weight onto lower extremities and use of gait device if needed and Cues for hand placement, technique and safety. Provide stabilization to prevent fall     PT long term treatment goals are located in below grid    Patient and or family understand(s) diagnosis, prognosis, and plan of care. Frequency of treatments: Patient will be seen  3-5 times/week.          Prior Level of Function: unknown   Rehab Potential: good  for baseline    Past medical history:   Past Medical History:   Diagnosis Date    Cerebral artery occlusion with cerebral infarction ([facility] [place] [address]     Hypertension     Panic attacks      Past Surgical History:   Procedure Laterality Date    COLONOSCOPY      ENDOSCOPY, COLON, DIAGNOSTIC      FOOT SURGERY      right toes    FOOT SURGERY Left 3/27/13    correction 4th, 5th toes left foot    LAPAROTOMY      UPPER GASTROINTESTINAL ENDOSCOPY  05/20/2014       SUBJECTIVE:    Precautions:  falls , hx of stroke with left sided deficits, repetitive speech, confusion   Social history: Patient came from nursing home    Equipment owned: unknown,      [address] [facility]  Mobility Inpatient   How much difficulty turning over in bed?: A Lot  How much difficulty sitting down on / standing up from a chair with arms?: Unable  How much difficulty moving from lying on back to sitting on side of bed?: A Lot  How much help from another person moving to and from a bed to a chair?: Total  How much help from another person needed to walk in hospital room?: Total  How much help from another person for climbing 3-5 steps with a railing?: Total  AM-PAC Inpatient Mobility Raw Score : 8  AM-PAC Inpatient T-Scale Score : 28.52  Mobility Inpatient CMS 0-100% Score: 86.62  Mobility Inpatient CMS G-Code Modifier :     Nursing cleared patient for PT evaluation. The admitting diagnosis and active problem list as listed above have been reviewed prior to the initiation of this evaluation. OBJECTIVE;   Initial Evaluation  Date: 2/10/2022 Treatment Date:     Short Term/ Long Term   Goals   Was pt agreeable to Eval/treatment? Yes  To be met in 4 days   Pain level   0/10       Bed Mobility    Rolling: Moderate assist of 1  To left   maximal assist to roll to right   Supine to sit: Not assessed   Attempted, incont of bowel x2  Sit to supine: Not assessed     Scooting: Not assessed     Rolling: Minimal assist of 1    Supine to sit: Moderate assist of 1    Sit to supine: Moderate assist of 1    Scooting: Maximal assist of 1     Transfers Sit to stand: Not assessed  d/t to pt overall debility, decreased activity tolerance, balance deficits, safety and fall risk. Sit to stand:  Moderate assist of  2        ROM Within functional limits except left UE    Increase range of motion 10% of affected joints    Strength BUE:  refer to OT eval  RLE:  3-/5  LLE:  0/5  Increase strength in affected mm groups by 1/3 grade   Balance Sitting EOB:  not assessed     Sitting EOB:  fair -       Patient is Alert & Oriented x person and place and follows one step directions    Sensation:  Patient  denies numbness/tingling   Edema:  no   Endurance: fair  -    Vitals: room air   Blood Pressure at rest  Blood Pressure during session    Heart Rate at rest  Heart Rate during session    SPO2 at rest %  SPO2 during session %     Patient education  Patient educated on role of Physical Therapy, risks of immobility, safety and plan of care,  importance of mobility while in hospital , ankle pumps, quad set and glut set for edema control, blood clot prevention and safety      Patient response to education:   Pt verbalized understanding Pt demonstrated skill Pt requires further education in this area   Yes No Yes      Treatment:  Patient practiced and was instructed/facilitated in the following treatment: Patient rolled x4 due to incont of bowel. Attempted to sit EOB and patient was incont of bowel again. Assisted back to supine, rolled x2. Assisted to [facility] [place] [place] max assist x2     Therapeutic Exercises:  ankle pumps, heel raises, hip abduction/adduction and straight leg raise  x 10 reps. PROM on left, AAROM on right     At end of session, patient in bed with alarm call light and phone within reach,  all lines and tubes intact, nursing notified. Patient would benefit from continued skilled Physical Therapy to improve functional independence and quality of life. Patient's/ family goals   none stated    Time in  0817  Time out  0848    Total Treatment Time  11 minutes    Evaluation time includes thorough review of current medical information, gathering information on past medical history/social history and prior level of function, completion of standardized testing/informal observation of tasks, assessment of data, and development of Plan of care and goals.      CPT codes:  Low Complexity PT evaluation (67626)  Therapeutic exercises (16970)   11 minutes  1 unit(s)    Bran Civil, PT 156.21

## 2022-02-10 NOTE — CONSULTS
Nephrology Consult Note  [facility]     Reason for Consult: AMBROCIO  Requesting Physician: Dr Maria Luisa Rao  Date of Service: 2/10/2022   Chief Complaint:  AMS  History Obtained From:  Patient, medical record      History of Present Ilness:      Ms Amanuel Anderson is a 46year old female who we are consulted on for evaluation and management of acute kidney injury    She was seen and examined in her room. She presented to the hospital on 2-9 with altered mental status from her nursing home. She cannot provide a clear history. She does have history of CVA. She denies any pain or nausea or shortness of breath. She states she has poor appetite. She has not history of kidney disease. Last creatinine was 1.0 from March 2020. Her creatinine was 3.5 on admission.           Past Medical History:        Diagnosis Date    Cerebral artery occlusion with cerebral infarction ([facility] [address]     Hypertension     Panic attacks        Past Surgical History:        Procedure Laterality Date    COLONOSCOPY      ENDOSCOPY, COLON, DIAGNOSTIC      FOOT SURGERY      right toes    FOOT SURGERY Left 3/27/13    correction 4th, 5th toes left foot    LAPAROTOMY      UPPER GASTROINTESTINAL ENDOSCOPY  05/20/2014       Current Medications:    Current Facility-Administered Medications: dextrose 5 % and 0.2 % NaCl infusion, , IntraVENous, Continuous  hydrALAZINE (APRESOLINE) injection 5 mg, 5 mg, IntraVENous, Once  docusate sodium (COLACE) capsule 100 mg, 100 mg, Oral, BID PRN  acetaminophen (TYLENOL) tablet 650 mg, 650 mg, Oral, Q6H PRN  ammonium lactate (LAC-HYDRIN) 12 % lotion, , Topical, PRN  atorvastatin (LIPITOR) tablet 40 mg, 40 mg, Oral, Daily  bisacodyl (DULCOLAX) suppository 10 mg, 10 mg, Rectal, Daily PRN  calcium carbonate (TUMS) chewable tablet 1,000 mg, 2 tablet, Oral, Daily  vitamin D3 (CHOLECALCIFEROL) tablet 2,500 Units, 2,500 Units, Oral, Daily  [Held by provider] divalproex (DEPAKOTE) DR tablet 250 mg, 250 mg, Oral, TID  dronabinol (MARINOL) capsule 5 mg, 5 mg, Oral, BID AC  [Held by provider] DULoxetine (CYMBALTA) extended release capsule 120 mg, 120 mg, Oral, Daily  ferrous sulfate (IRON 325) tablet 325 mg, 325 mg, Oral, Daily with breakfast  melatonin disintegrating tablet 5 mg, 5 mg, Oral, Nightly  metoclopramide (REGLAN) tablet 10 mg, 10 mg, Oral, 4x Daily  midodrine (PROAMATINE) tablet 5 mg, 5 mg, Oral, TID  therapeutic multivitamin-minerals 1 tablet, 1 tablet, Oral, Daily  polyethylene glycol (GLYCOLAX) packet 17 g, 17 g, Oral, Daily PRN  sertraline (ZOLOFT) tablet 25 mg, 25 mg, Oral, Daily  ascorbic acid (VITAMIN C) tablet 500 mg, 500 mg, Oral, BID  zinc sulfate (ZINCATE) capsule 50 mg, 50 mg, Oral, Daily  cefTRIAXone (ROCEPHIN) 1,000 mg in sterile water 10 mL IV syringe, 1,000 mg, IntraVENous, Q24H    Allergies:  Bactrim [sulfamethoxazole-trimethoprim]    Social History:    Unable to obtain due to medical condition     Family History:   Unable to obtain due to medical condition     Review of Systems:   Pertinent positives stated above in HPI. All other systems were reviewed and were negative.     Physical exam:   Constitutional:  VITALS:  BP (!) 147/103   Pulse 78   Temp 98.5 °F (36.9 °C) (Oral)   Resp 16   Wt 127 lb 8 oz (57.8 kg)   SpO2 98%   BMI 23.32 kg/m²     Gen: alert, awake, no acute distress  Eyes: anicteric sclerae, eomi  HEENT: atraumatic/normocephalic   Lungs: clear to ascultation bilaterally, equal lung expansion  CV: RRR, no murmurs  Abdomen: soft, nontender, nondistended, normoactive bowel sounds  Extremitiy: no edema  : no CVA tenderness, +pollack   Skin: no rash, tugor wnl  Neuro: left sided weakness   Psych: cooperative             Data:       Last 3 CMP:    Recent Labs     02/09/22  1135 02/10/22  0616    148*   K 3.7 3.6   * 117*   CO2 19* 16*   BUN 27* 22*   CREATININE 3.5* 2.2*   GLUCOSE 104* 73*   CALCIUM 9.0 8.5*   PROT 6.1* 5.7*   LABALBU 2.9* 2.7*   BILITOT <0.2 <0.2   ALKPHOS 89 76   AST 13 11   ALT 16 9         Last 3 Glucose:     Recent Labs     02/09/22  1135 02/10/22  0616   GLUCOSE 104* 73*         Last 3 POC Glucose:     No results for input(s): POCGLU in the last 72 hours. Last 3 CK, CKMB, Troponin  No results for input(s): CKTOTAL, CKMB, TROPONINI in the last 72 hours. Last 3 CBC:  Recent Labs     02/09/22  1135 02/10/22  0616   WBC 7.3 5.7   RBC 3.88 3.93   HGB 12.5 12.7   HCT 37.9 38.2   MCV 97.7 97.2   MCH 32.2 32.3   MCHC 33.0 33.2   RDW 11.6 11.8    240   MPV 9.7 9.6       Last 3 Hepatic Function Panel:    Recent Labs     02/09/22  1135 02/10/22  0616   ALKPHOS 89 76   ALT 16 9   AST 13 11   PROT 6.1* 5.7*   BILITOT <0.2 <0.2   LABALBU 2.9* 2.7*       Albumin:  Recent Labs     02/10/22  0616   LABALBU 2.7*       Calcium:  Recent Labs     02/10/22  0616   CALCIUM 8.5*       Ionized Calcium:  No results for input(s): IONCA in the last 72 hours. Magnesium:    Recent Labs     02/09/22  1135   MG 1.8         ABGs:  No results for input(s): PHART, PO2ART, NKJ1FOO, HOF9CUS, BEART, Y6TWUZAF in the last 72 hours. Lactic Acid:  No results for input(s): LACTA in the last 72 hours. Last 3 Amylase:  No results for input(s): AMYLASE in the last 72 hours. Last 3 Lipase:  No results for input(s): LIPASE in the last 72 hours. Last 3 BNP:  No results for input(s): BNP in the last 72 hours. US RETROPERITONEAL COMPLETE [[id number]] Collected: 02/09/22 2143     Order Status: Completed Updated: 02/09/22 2150     Narrative:       EXAMINATION:   RETROPERITONEAL ULTRASOUND OF THE KIDNEYS AND URINARY BLADDER     2/9/2022     COMPARISON:   None     HISTORY:   ORDERING SYSTEM PROVIDED HISTORY: AMBROCIO   TECHNOLOGIST PROVIDED HISTORY:     Reason for exam:->AMBROCIO   What reading provider will be dictating this exam?->CRC     FINDINGS:   The right kidney measures 10.1 x 6.1 x 6.0 cm.  Left kidney measures 10.5 x   5.2 x 5.2 cm.      There is mild right hydronephrosis. Kaylynn Plate may be minimal left hydronephrosis. Bladder appears unremarkable.  Bilateral ureteral jets visualized.      Impression:       Mild right and minimal left hydronephrosis. Bilateral ureteral jets visualized within the bladder.  I cannot confirm   obstructive uropathy. Assessment/Plan      1 Acute kidney injury  Creatinine 3.5 on admission with last creatinine 1.0 from March 2020    Most likely due to changes in perfusion with decreased oral intake. She is on ARB and NSAIDs her report. She had pollack placed. She has bilateral hydronephrosis on imagining.   UA with no protein, no RBCs, >20 WBCs     Give IV fluids  Follow renal function, urine output  Urology consulted for hydronephrosis  Hold ARB  Avoid NSAIDs  Follow urine culture         2 Hypernatremia  With decreased intake and isotonic fluids  Give hypotonic fluids - agree with change to d5 0.225% made this AM  Encourage oral intake, as safe    3 Metabolic acidosis  With renal failure and chloride rich IV fluids  IV fluids adjusted    Give bicarbonate, though will likely not require chronically           Thank you for the opportunity to participate in the care of Ms Aleksandra Brizuela    ______________________________      Laura Steele MD  2/10/2022  1:47 PM

## 2022-02-10 NOTE — PROGRESS NOTES
SPEECH/LANGUAGE PATHOLOGY  CLINICAL ASSESSMENT OF SWALLOWING FUNCTION   and PLAN OF CARE    PATIENT NAME:  Brian Hernandez  (female)     MRN:  [id number]    :  1970  (46 y.o.)  STATUS:  Inpatient: Room 6854/6932-02    TODAY'S DATE:  2/10/2022  REFERRING PROVIDER:   Dr. Mario Avina: SLP eval and treat Date of order:  22  REASON FOR REFERRAL: assess swallow fx/change in mental status  EVALUATING THERAPIST: NELSON Kaufman                 RESULTS:    DYSPHAGIA DIAGNOSIS:   Clinical indicators of limited intake on exam, not able to determine type or severity of dysphagia however suspected dysphagia secondary to cognitive status      DIET RECOMMENDATIONS:  Pureed consistency solids (IDDSI level 4) with thin liquids (IDDSI level 0) and Administer medication crushed, as able, with pudding/applesauce    Limited acceptance of PO, per nursing judgement meds may need to be given via IV. Per soft chart, patient was on regular solids/thin liquids at facility      FEEDING RECOMMENDATIONS:     Assistance level:  Full assistance is needed during all oral intake-after a few attempted cup sips of water post solid consumption, patient proceeded to bite the styrofoam cup and masticate it. SLP removed the styrofoam from patient's oral cavity. Compensatory strategies recommended: Small bites/sips, Alternate solids and liquids and No straw-patient was unable to retreive liquid through a straw, she proceeded to bite it.        Discussed recommendations with nursing and/or faxed report to referring provider: Yes    SPEECH THERAPY  PLAN OF CARE   The dysphagia POC is established based on physician order, dysphagia diagnosis and results of clinical assessment     Meal time assessment for 1-2 sessions to provide diet modification and compensatory strategy implementation due to need to ensure proper implementation of compensatory strategies during PO intake     Conditions Requiring Skilled Therapeutic Intervention for dysphagia:    Patient is performing below functional baseline d/t  current acute condition, Multiple diagnoses, multiple medications, and increased dependency upon caregivers. Specific dysphagia interventions to include:     Trials of upgraded diet/liquid   Meal time assessment for 1-2 sessions to provide diet modification and compensatory strategy implementation due to need to ensure proper implementation of compensatory strategies during PO intake     Specific instructions for next treatment:  ongoing PO analysis to upgrade diet and evaluate tolerance of current PO recommendation  Patient Treatment Goals:    Short Term Goals:  Pt will complete PO trials of upgraded diet textures with SLP only to determine the least restrictive PO diet to maintain adequate nutrition/hydration with no more than 1 overt s/s of pen/asp. Pt will participate in meal time assessment for 1-2 sessions to provide diet modification and compensatory strategy implementation due to need to ensure proper implementation of compensatory strategies during PO intake     Long Term Goals:   Pt will improve oropharyngeal swallow function to ensure airway protection during PO intake to maintain adequate nutrition/hydration and decrease signs/symptoms of aspiration to less than 1 x/day.       Patient/family Goal:    Patient not able to accurately state due to impaired cognition and/or communication at time of eval     Plan of care discussed with Patient   The Patient did not demonstrate complete understanding of the diagnosis, prognosis and plan of care     Rehabilitation Potential/Prognosis: fair                    ADMITTING DIAGNOSIS: Dehydration [E86.0]  Altered mental status [R41.82]  Essential hypertension [I10]  Acute cystitis without hematuria [N30.00]  Acute renal failure, unspecified acute renal failure type ([facility] [address] [N17.9]  Altered mental status, unspecified altered mental status type [R41.82]    VISIT DIAGNOSIS:   Visit Diagnoses       Codes    Dehydration     E86.0    Acute renal failure, unspecified acute renal failure type ([facility] [address]     N17.9    Essential hypertension     I10    Acute cystitis without hematuria     N30.00           PATIENT REPORT/COMPLAINT: patient not able to accurately report  RN cleared patient for participation in assessment     yes     PRIOR LEVEL OF SWALLOW FUNCTION:    PAST HISTORY OF DYSPHAGIA?: none reported per soft chart     Facility diet: Regular consistency solids (IDDSI level 7) with  thin liquids (IDDSI level 0)  Current Diet Order:  No diet orders on file    PROCEDURE:  Consistencies Administered During the Evaluation   Liquids: thin liquid   Solids:  pureed foods and very small bite of solid foods      Method of Intake:   cup, straw, spoon-unsuccessful  Fed by clinician      Position:   Seated, upright    CLINICAL ASSESSMENT:  Oral Stage:       Reduced oral acceptance from spoon  Oral residuals were noted :  throughout the oral cavity  Prolonged mastication due to cognitive status       Pharyngeal Stage:    No signs of aspiration were noted during this evaluation however, silent aspiration cannot be ruled out at bedside. If silent aspiration is suspected, a Videofluoroscopic Study of Swallowing (MBS) is recommended and requires a physician order. Cognition:   Follows 1 - step directions inconsistently for this assessment and Confusion noted    Oral Peripheral Examination   Could not test    Current Respiratory Status    room air     Parameters of Speech Production  Respiration:  Adequate for speech production  Quality:   Within functional limits  Intensity: Within functional limits    Volitional Swallow: not able to elicit     Volitional Cough:   not able to elicit     Pain: No pain reported. EDUCATION:   The Speech Language Pathologist (SLP) completed education regarding results of evaluation and that intervention is warranted at this time.   Learner: Patient  Education: Reviewed results and recommendations of this evaluation  Evaluation of Education:  No evidence of learning    This plan may be re-evaluated and revised as warranted. Evaluation Time includes thorough review of current medical information, gathering information on past medical history/social history and prior level of function, completion of standardized testing/informal observation of tasks, assessment of data and education on plan of care and goals. [x]The admitting diagnosis and active problem list, have been reviewed prior to initiation of this evaluation. ACTIVE PROBLEM LIST:   Patient Active Problem List   Diagnosis    Hammer toe, acquired    Altered mental status         CPT code:  00016  bedside swallow eval    Carl Jackson D'Amico M. A.  [address] [place] [address] [id number]  Speech Language Pathologist

## 2022-02-10 NOTE — CARE COORDINATION
Ss note: 2/10/2022.2:35 PM Neg covid on 2-9-2022. Pt presented from [facility] however per family request referral made to [facility] at [facility]. Per liaison pt has been accepted, will need NEG RAPID COVID and [address] [facility]  Liaison is submitting, will need to await insurance approval. KAROLINA Vazquez

## 2022-02-10 NOTE — PROGRESS NOTES
[address] [facility]  [facility] [place]. [place]        Date:2/10/2022                                                  Patient Name: Leia Childers    MRN: [id number]    : 1970    Room: [facility]8738-95      Evaluating OT: Gisselle Clifford OTR/L; [id number]     Referring Provider and Specific Provider Orders/Date:      22  OT eval and treat  Start:  22,   End:  22,   ONE TIME,   Standing Count:  1 Occurrences,   Aristides Edouard MD     Placement Recommendation: Subacute        Diagnosis:   1. Altered mental status, unspecified altered mental status type    2. Dehydration    3. Acute renal failure, unspecified acute renal failure type ([facility] [address]    4. Essential hypertension    5.  Acute cystitis without hematuria         Surgery: none       Pertinent Medical History:       Past Medical History:   Diagnosis Date    Cerebral artery occlusion with cerebral infarction ([facility] [address]     Hypertension     Panic attacks          Past Surgical History:   Procedure Laterality Date    COLONOSCOPY      ENDOSCOPY, COLON, DIAGNOSTIC      FOOT SURGERY      right toes    FOOT SURGERY Left 3/27/13    correction 4th, 5th toes left foot    LAPAROTOMY      UPPER GASTROINTESTINAL ENDOSCOPY  2014      Precautions:  Fall Risk, hx of CVA with L sided deficits      Assessment of current deficits    [x] Functional mobility  [x]ADLs  [x] Strength               [x]Cognition    [x] Functional transfers   [x] IADLs         [x] Safety Awareness   [x]Endurance    [x] Fine Coordination              [x] Balance      [] Vision/perception   [x]Sensation     [x]Gross Motor Coordination  [x] ROM  [] Delirium                   [x] Motor Control     OT PLAN OF CARE   OT POC based on physician orders, patient diagnosis and results of clinical assessment    Frequency/Duration 1-3 days/wk for 2 weeks PRN     Specific OT Treatment Interventions to include:   * Instruction/training on adapted ADL techniques and AE recommendations to increase functional independence within precautions       * Training on energy conservation strategies, correct breathing pattern and techniques to improve independence/tolerance for self-care routine  * Functional transfer/mobility training/DME recommendations for increased independence, safety, and fall prevention  * Patient/Family education to increase follow through with safety techniques and functional independence  * Recommendation of environmental modifications for increased safety with functional transfers/mobility and ADLs  * Cognitive retraining/development of therapeutic activities to improve problem solving, judgement, memory, and attention for increased safety/participation in ADL/IADL tasks  * Sensory re-education to improve body/limb awareness, maintain/improve skin integrity, and improve hand/UE motor function  * Splinting/positioning for increased function, prevention of contractures, and improve skin integrity  * Therapeutic exercise to improve motor endurance, ROM, and functional strength for ADLs/functional transfers  * Therapeutic activities to facilitate/challenge dynamic balance, stand tolerance for increased safety and independence with ADLs  * Therapeutic activities to facilitate gross/fine motor skills for increased independence with ADLs  * Positioning to improve skin integrity, interaction with environment and functional independence    Recommended Adaptive Equipment: TBD at rehab      Home Living: pt came to use from [facility]        Equipment owned: SNF equipment    Prior Level of Function: Needs assistance with ADLs and IADL    Pain Level: no c/o pain during evaluation; Nursing notified.       Cognition: A&O: 1/4; Follows 1 step directions   Memory: poor   Sequencing: poor   Problem solving: poor   Judgement/safety: poor    [facility]   AM-PAC Daily Activity Inpatient   How much help for putting on and taking off regular lower body clothing?: Total  How much help for Bathing?: A Lot  How much help for Toileting?: Total  How much help for putting on and taking off regular upper body clothing?: A Lot  How much help for taking care of personal grooming?: A Lot  How much help for eating meals?: A Lot  AM-PAC Inpatient Daily Activity Raw Score: 10  AM-PAC Inpatient ADL T-Scale Score : 27.31  ADL Inpatient CMS 0-100% Score: 74.7  ADL Inpatient CMS G-Code Modifier : CL     Functional Assessment:    Initial Eval Status  Date: 2/10/22 Treatment Status  Date: STGs = LTGs  Time frame: 10-14 days   Feeding Moderate Assist   Supervision    Grooming Maximal Assist   Supervision    UB Dressing Maximal Assist   Supervision    LB Dressing Dependent   Moderate Assist    Bathing Maximal Assist  Minimal Assist    Toileting Dependent   Moderate Assist    Bed Mobility  Supine to sit: Maximal Assist   Sit to supine: Maximal Assist   Maximal assist x 2 to scoot up in bed. Maximal assist for positioning in bed for midline orientation, symmetry, and comfort. Supine to sit: Supervision   Sit to supine: Supervision    Functional Transfers N/T due to poor sitting balance at EOB with maximal assist to maintain upright posture d/t multiplane instability. Minimal Assist    Functional Mobility N/T   Moderate Assist    Balance Sitting:     Static: poor    Dynamic: poor  Standing: N/T      Activity Tolerance poor  Fair plus    Visual/  Perceptual Glasses: no                 Hand Dominance: left      AROM (PROM) Strength Additional Info:    RUE  WFL 3/5 good  and wfl FMC/dexterity noted during ADL tasks     LUE Limited in all planes  2-/5        Hearing: WFL   Sensation: unknown   Tone: WFL   Edema: no     Comments: Upon arrival the patient was supine leaning against right bed rail. At end of session, patient was returned to supine and repositioning in bed for midline orientation.  Call light and phone within reach, all lines and tubes intact. Overall patient demonstrated decreased independence and safety during completion of ADL/functional transfer/mobility tasks. Pt would benefit from continued skilled OT to increase safety and independence with completion of ADL/IADL tasks for functional independence and quality of life. Treatment: OT treatment provided this date includes:    Instruction/training on safety and adapted techniques for completion of ADLs    Instruction/training on safe functional mobility/transfer techniques    Instruction/training on energy conservation/work simplification for completion of ADLs    Proper Positioning/Alignment    Rehab Potential: Good for established goals. Patient / Family Goal: no goal stated, pt began talking about her uncle      Patient and/or family were instructed on functional diagnosis, prognosis/goals and OT plan of care. Demonstrated good understanding. Eval Complexity: Low    Time In: 2:30pm  Time Out: 2:55pm    Total Treatment Time: 0      Min Units   OT Eval Low 97165  X  1    OT Eval Medium 84046      OT Eval High 60284      OT Re-Eval [id number]            ADL/Self Care 90583     Therapeutic Activities 98587       Therapeutic Ex 11164       Orthotic Management 55631       Manual [id number]     Neuro Re-Ed [id number]       Non-Billable Time        Evaluation Time additionally includes thorough review of current medical information, gathering information on past medical history/social history and prior level of function, interpretation of standardized testing/informal observation of tasks, assessment of data and development of plan of care and goals.         Evaluating OT: Zandra Schmitz OTR/L; [id number]

## 2022-02-11 ENCOUNTER — APPOINTMENT (OUTPATIENT)
Dept: GENERAL RADIOLOGY | Age: 52
DRG: 660 | End: 2022-02-11
Payer: COMMERCIAL

## 2022-02-11 ENCOUNTER — ANESTHESIA (OUTPATIENT)
Dept: OPERATING ROOM | Age: 52
DRG: 660 | End: 2022-02-11
Payer: COMMERCIAL

## 2022-02-11 ENCOUNTER — ANESTHESIA EVENT (OUTPATIENT)
Dept: OPERATING ROOM | Age: 52
DRG: 660 | End: 2022-02-11
Payer: COMMERCIAL

## 2022-02-11 VITALS
SYSTOLIC BLOOD PRESSURE: 156 MMHG | RESPIRATION RATE: 14 BRPM | OXYGEN SATURATION: 99 % | DIASTOLIC BLOOD PRESSURE: 124 MMHG

## 2022-02-11 LAB
ALBUMIN SERPL-MCNC: 2.5 G/DL (ref 3.5–5.2)
ALP BLD-CCNC: 84 U/L (ref 35–104)
ALT SERPL-CCNC: 15 U/L (ref 0–32)
ANION GAP SERPL CALCULATED.3IONS-SCNC: 10 MMOL/L (ref 7–16)
ANION GAP SERPL CALCULATED.3IONS-SCNC: 14 MMOL/L (ref 7–16)
AST SERPL-CCNC: 19 U/L (ref 0–31)
BASOPHILS ABSOLUTE: 0.03 E9/L (ref 0–0.2)
BASOPHILS RELATIVE PERCENT: 0.4 % (ref 0–2)
BILIRUB SERPL-MCNC: 0.2 MG/DL (ref 0–1.2)
BUN BLDV-MCNC: 12 MG/DL (ref 6–20)
BUN BLDV-MCNC: 8 MG/DL (ref 6–20)
CALCIUM SERPL-MCNC: 8.4 MG/DL (ref 8.6–10.2)
CALCIUM SERPL-MCNC: 8.7 MG/DL (ref 8.6–10.2)
CHLORIDE BLD-SCNC: 116 MMOL/L (ref 98–107)
CHLORIDE BLD-SCNC: 119 MMOL/L (ref 98–107)
CO2: 16 MMOL/L (ref 22–29)
CO2: 19 MMOL/L (ref 22–29)
CREAT SERPL-MCNC: 0.5 MG/DL (ref 0.5–1)
CREAT SERPL-MCNC: 0.6 MG/DL (ref 0.5–1)
EOSINOPHILS ABSOLUTE: 0.1 E9/L (ref 0.05–0.5)
EOSINOPHILS RELATIVE PERCENT: 1.5 % (ref 0–6)
GFR AFRICAN AMERICAN: >60
GFR AFRICAN AMERICAN: >60
GFR NON-AFRICAN AMERICAN: >60 ML/MIN/1.73
GFR NON-AFRICAN AMERICAN: >60 ML/MIN/1.73
GLUCOSE BLD-MCNC: 118 MG/DL (ref 74–99)
GLUCOSE BLD-MCNC: 77 MG/DL (ref 74–99)
HCT VFR BLD CALC: 35.1 % (ref 34–48)
HEMOGLOBIN: 11.5 G/DL (ref 11.5–15.5)
IMMATURE GRANULOCYTES #: 0.11 E9/L
IMMATURE GRANULOCYTES %: 1.6 % (ref 0–5)
LYMPHOCYTES ABSOLUTE: 1.25 E9/L (ref 1.5–4)
LYMPHOCYTES RELATIVE PERCENT: 18.7 % (ref 20–42)
MAGNESIUM: 1.6 MG/DL (ref 1.6–2.6)
MCH RBC QN AUTO: 31.9 PG (ref 26–35)
MCHC RBC AUTO-ENTMCNC: 32.8 % (ref 32–34.5)
MCV RBC AUTO: 97.2 FL (ref 80–99.9)
MONOCYTES ABSOLUTE: 0.65 E9/L (ref 0.1–0.95)
MONOCYTES RELATIVE PERCENT: 9.7 % (ref 2–12)
NEUTROPHILS ABSOLUTE: 4.55 E9/L (ref 1.8–7.3)
NEUTROPHILS RELATIVE PERCENT: 68.1 % (ref 43–80)
PDW BLD-RTO: 12.1 FL (ref 11.5–15)
PHOSPHORUS: 2.3 MG/DL (ref 2.5–4.5)
PLATELET # BLD: 227 E9/L (ref 130–450)
PMV BLD AUTO: 10.1 FL (ref 7–12)
POTASSIUM SERPL-SCNC: 3.9 MMOL/L (ref 3.5–5)
POTASSIUM SERPL-SCNC: 3.9 MMOL/L (ref 3.5–5)
RBC # BLD: 3.61 E12/L (ref 3.5–5.5)
SARS-COV-2, NAAT: NOT DETECTED
SODIUM BLD-SCNC: 146 MMOL/L (ref 132–146)
SODIUM BLD-SCNC: 148 MMOL/L (ref 132–146)
TOTAL PROTEIN: 5.3 G/DL (ref 6.4–8.3)
WBC # BLD: 6.7 E9/L (ref 4.5–11.5)

## 2022-02-11 PROCEDURE — 1200000000 HC SEMI PRIVATE

## 2022-02-11 PROCEDURE — 6360000002 HC RX W HCPCS: Performed by: INTERNAL MEDICINE

## 2022-02-11 PROCEDURE — 6360000002 HC RX W HCPCS: Performed by: ANESTHESIOLOGY

## 2022-02-11 PROCEDURE — 2580000003 HC RX 258: Performed by: INTERNAL MEDICINE

## 2022-02-11 PROCEDURE — BT141ZZ FLUOROSCOPY OF KIDNEYS, URETERS AND BLADDER USING LOW OSMOLAR CONTRAST: ICD-10-PCS | Performed by: UROLOGY

## 2022-02-11 PROCEDURE — 6360000002 HC RX W HCPCS

## 2022-02-11 PROCEDURE — 36415 COLL VENOUS BLD VENIPUNCTURE: CPT

## 2022-02-11 PROCEDURE — 3700000000 HC ANESTHESIA ATTENDED CARE: Performed by: UROLOGY

## 2022-02-11 PROCEDURE — 3700000001 HC ADD 15 MINUTES (ANESTHESIA): Performed by: UROLOGY

## 2022-02-11 PROCEDURE — 2709999900 HC NON-CHARGEABLE SUPPLY: Performed by: UROLOGY

## 2022-02-11 PROCEDURE — C1769 GUIDE WIRE: HCPCS | Performed by: UROLOGY

## 2022-02-11 PROCEDURE — 0T2BX0Z CHANGE DRAINAGE DEVICE IN BLADDER, EXTERNAL APPROACH: ICD-10-PCS | Performed by: UROLOGY

## 2022-02-11 PROCEDURE — BT1D1ZZ FLUOROSCOPY OF RIGHT KIDNEY, URETER AND BLADDER USING LOW OSMOLAR CONTRAST: ICD-10-PCS | Performed by: UROLOGY

## 2022-02-11 PROCEDURE — 74400 UROGRAPHY IV +-KUB TOMOG: CPT

## 2022-02-11 PROCEDURE — 83735 ASSAY OF MAGNESIUM: CPT

## 2022-02-11 PROCEDURE — 3600000003 HC SURGERY LEVEL 3 BASE: Performed by: UROLOGY

## 2022-02-11 PROCEDURE — 85025 COMPLETE CBC W/AUTO DIFF WBC: CPT

## 2022-02-11 PROCEDURE — 0T788DZ DILATION OF BILATERAL URETERS WITH INTRALUMINAL DEVICE, VIA NATURAL OR ARTIFICIAL OPENING ENDOSCOPIC: ICD-10-PCS | Performed by: UROLOGY

## 2022-02-11 PROCEDURE — 80053 COMPREHEN METABOLIC PANEL: CPT

## 2022-02-11 PROCEDURE — 7100000001 HC PACU RECOVERY - ADDTL 15 MIN: Performed by: UROLOGY

## 2022-02-11 PROCEDURE — 6370000000 HC RX 637 (ALT 250 FOR IP): Performed by: INTERNAL MEDICINE

## 2022-02-11 PROCEDURE — 3600000013 HC SURGERY LEVEL 3 ADDTL 15MIN: Performed by: UROLOGY

## 2022-02-11 PROCEDURE — 6360000004 HC RX CONTRAST MEDICATION: Performed by: UROLOGY

## 2022-02-11 PROCEDURE — 87635 SARS-COV-2 COVID-19 AMP PRB: CPT

## 2022-02-11 PROCEDURE — 2580000003 HC RX 258: Performed by: NURSE ANESTHETIST, CERTIFIED REGISTERED

## 2022-02-11 PROCEDURE — 80048 BASIC METABOLIC PNL TOTAL CA: CPT

## 2022-02-11 PROCEDURE — C1758 CATHETER, URETERAL: HCPCS | Performed by: UROLOGY

## 2022-02-11 PROCEDURE — 6360000002 HC RX W HCPCS: Performed by: NURSE ANESTHETIST, CERTIFIED REGISTERED

## 2022-02-11 PROCEDURE — 7100000000 HC PACU RECOVERY - FIRST 15 MIN: Performed by: UROLOGY

## 2022-02-11 PROCEDURE — 84100 ASSAY OF PHOSPHORUS: CPT

## 2022-02-11 RX ORDER — MAGNESIUM SULFATE IN WATER 40 MG/ML
2000 INJECTION, SOLUTION INTRAVENOUS ONCE
Status: COMPLETED | OUTPATIENT
Start: 2022-02-11 | End: 2022-02-11

## 2022-02-11 RX ORDER — HYDRALAZINE HYDROCHLORIDE 20 MG/ML
5 INJECTION INTRAMUSCULAR; INTRAVENOUS ONCE
Status: COMPLETED | OUTPATIENT
Start: 2022-02-11 | End: 2022-02-11

## 2022-02-11 RX ORDER — HYDRALAZINE HYDROCHLORIDE 20 MG/ML
INJECTION INTRAMUSCULAR; INTRAVENOUS
Status: COMPLETED
Start: 2022-02-11 | End: 2022-02-11

## 2022-02-11 RX ORDER — POTASSIUM CHLORIDE 29.8 MG/ML
20 INJECTION INTRAVENOUS
Status: COMPLETED | OUTPATIENT
Start: 2022-02-11 | End: 2022-02-11

## 2022-02-11 RX ORDER — HEPARIN SODIUM 5000 [USP'U]/ML
5000 INJECTION, SOLUTION INTRAVENOUS; SUBCUTANEOUS EVERY 8 HOURS SCHEDULED
Status: DISCONTINUED | OUTPATIENT
Start: 2022-02-11 | End: 2022-02-13 | Stop reason: HOSPADM

## 2022-02-11 RX ORDER — MEPERIDINE HYDROCHLORIDE 25 MG/ML
INJECTION INTRAMUSCULAR; INTRAVENOUS; SUBCUTANEOUS
Status: COMPLETED
Start: 2022-02-11 | End: 2022-02-11

## 2022-02-11 RX ORDER — PROPOFOL 10 MG/ML
INJECTION, EMULSION INTRAVENOUS CONTINUOUS PRN
Status: DISCONTINUED | OUTPATIENT
Start: 2022-02-11 | End: 2022-02-11 | Stop reason: SDUPTHER

## 2022-02-11 RX ORDER — FENTANYL CITRATE 50 UG/ML
INJECTION, SOLUTION INTRAMUSCULAR; INTRAVENOUS PRN
Status: DISCONTINUED | OUTPATIENT
Start: 2022-02-11 | End: 2022-02-11 | Stop reason: SDUPTHER

## 2022-02-11 RX ORDER — SODIUM CHLORIDE 9 MG/ML
INJECTION, SOLUTION INTRAVENOUS CONTINUOUS PRN
Status: DISCONTINUED | OUTPATIENT
Start: 2022-02-11 | End: 2022-02-11 | Stop reason: SDUPTHER

## 2022-02-11 RX ORDER — DEXTROSE MONOHYDRATE 50 MG/ML
50 INJECTION, SOLUTION INTRAVENOUS CONTINUOUS
Status: DISCONTINUED | OUTPATIENT
Start: 2022-02-11 | End: 2022-02-12

## 2022-02-11 RX ORDER — POTASSIUM CHLORIDE 7.45 MG/ML
INJECTION INTRAVENOUS
Status: DISPENSED
Start: 2022-02-11 | End: 2022-02-11

## 2022-02-11 RX ORDER — MIDAZOLAM HYDROCHLORIDE 1 MG/ML
INJECTION INTRAMUSCULAR; INTRAVENOUS PRN
Status: DISCONTINUED | OUTPATIENT
Start: 2022-02-11 | End: 2022-02-11 | Stop reason: SDUPTHER

## 2022-02-11 RX ORDER — MEPERIDINE HYDROCHLORIDE 25 MG/ML
12.5 INJECTION INTRAMUSCULAR; INTRAVENOUS; SUBCUTANEOUS EVERY 5 MIN PRN
Status: DISCONTINUED | OUTPATIENT
Start: 2022-02-11 | End: 2022-02-13 | Stop reason: HOSPADM

## 2022-02-11 RX ADMIN — MEPERIDINE HYDROCHLORIDE 12.5 MG: 25 INJECTION INTRAMUSCULAR; INTRAVENOUS; SUBCUTANEOUS at 11:30

## 2022-02-11 RX ADMIN — WATER 1000 MG: 1 INJECTION INTRAMUSCULAR; INTRAVENOUS; SUBCUTANEOUS at 21:08

## 2022-02-11 RX ADMIN — DEXTROSE MONOHYDRATE 50 ML/HR: 50 INJECTION, SOLUTION INTRAVENOUS at 17:51

## 2022-02-11 RX ADMIN — POTASSIUM CHLORIDE 20 MEQ: 29.8 INJECTION, SOLUTION INTRAVENOUS at 05:40

## 2022-02-11 RX ADMIN — MEPERIDINE HYDROCHLORIDE 12.5 MG: 25 INJECTION, SOLUTION INTRAMUSCULAR; INTRAVENOUS; SUBCUTANEOUS at 11:30

## 2022-02-11 RX ADMIN — HYDRALAZINE HYDROCHLORIDE: 20 INJECTION INTRAMUSCULAR; INTRAVENOUS at 12:23

## 2022-02-11 RX ADMIN — POTASSIUM CHLORIDE 20 MEQ: 29.8 INJECTION, SOLUTION INTRAVENOUS at 08:11

## 2022-02-11 RX ADMIN — SODIUM BICARBONATE 650 MG TABLET 1300 MG: at 21:11

## 2022-02-11 RX ADMIN — POTASSIUM CHLORIDE 20 MEQ: 29.8 INJECTION, SOLUTION INTRAVENOUS at 06:42

## 2022-02-11 RX ADMIN — FENTANYL CITRATE 50 MCG: 50 INJECTION, SOLUTION INTRAMUSCULAR; INTRAVENOUS at 11:24

## 2022-02-11 RX ADMIN — OXYCODONE HYDROCHLORIDE AND ACETAMINOPHEN 500 MG: 500 TABLET ORAL at 21:12

## 2022-02-11 RX ADMIN — Medication 250 MG: at 18:04

## 2022-02-11 RX ADMIN — MIDAZOLAM 2 MG: 1 INJECTION INTRAMUSCULAR; INTRAVENOUS at 10:55

## 2022-02-11 RX ADMIN — DRONABINOL 5 MG: 2.5 CAPSULE ORAL at 17:49

## 2022-02-11 RX ADMIN — PROPOFOL INJECTABLE EMULSION 75 MCG/KG/MIN: 10 INJECTION, EMULSION INTRAVENOUS at 10:58

## 2022-02-11 RX ADMIN — MAGNESIUM SULFATE HEPTAHYDRATE 2000 MG: 2 INJECTION, SOLUTION INTRAVENOUS at 17:57

## 2022-02-11 RX ADMIN — SODIUM CHLORIDE: 9 INJECTION, SOLUTION INTRAVENOUS at 10:55

## 2022-02-11 RX ADMIN — POTASSIUM CHLORIDE 20 MEQ: 29.8 INJECTION, SOLUTION INTRAVENOUS at 04:45

## 2022-02-11 RX ADMIN — HEPARIN SODIUM 5000 UNITS: 5000 INJECTION INTRAVENOUS; SUBCUTANEOUS at 21:13

## 2022-02-11 RX ADMIN — METOCLOPRAMIDE 10 MG: 10 TABLET ORAL at 21:12

## 2022-02-11 RX ADMIN — HYDRALAZINE HYDROCHLORIDE 5 MG: 20 INJECTION INTRAMUSCULAR; INTRAVENOUS at 13:20

## 2022-02-11 RX ADMIN — FENTANYL CITRATE 50 MCG: 50 INJECTION, SOLUTION INTRAMUSCULAR; INTRAVENOUS at 10:58

## 2022-02-11 RX ADMIN — METOCLOPRAMIDE 10 MG: 10 TABLET ORAL at 17:54

## 2022-02-11 RX ADMIN — DEXTROSE AND SODIUM CHLORIDE: 5; 200 INJECTION, SOLUTION INTRAVENOUS at 03:43

## 2022-02-11 RX ADMIN — Medication 5 MG: at 21:12

## 2022-02-11 ASSESSMENT — PAIN DESCRIPTION - PAIN TYPE: TYPE: ACUTE PAIN

## 2022-02-11 ASSESSMENT — PULMONARY FUNCTION TESTS
PIF_VALUE: 1
PIF_VALUE: 0
PIF_VALUE: 1
PIF_VALUE: 0
PIF_VALUE: 0
PIF_VALUE: 1
PIF_VALUE: 0
PIF_VALUE: 1

## 2022-02-11 ASSESSMENT — PAIN DESCRIPTION - PROGRESSION
CLINICAL_PROGRESSION: GRADUALLY WORSENING
CLINICAL_PROGRESSION: GRADUALLY WORSENING

## 2022-02-11 ASSESSMENT — PAIN DESCRIPTION - DESCRIPTORS: DESCRIPTORS: SORE

## 2022-02-11 ASSESSMENT — PAIN DESCRIPTION - LOCATION: LOCATION: OTHER (COMMENT)

## 2022-02-11 ASSESSMENT — PAIN SCALES - GENERAL
PAINLEVEL_OUTOF10: 4
PAINLEVEL_OUTOF10: 0

## 2022-02-11 ASSESSMENT — PAIN DESCRIPTION - FREQUENCY: FREQUENCY: CONTINUOUS

## 2022-02-11 NOTE — H&P
[address]                              HISTORY AND PHYSICAL    PATIENT NAME: Jamil Galvan                        :        1970  MED REC NO:   [id number]                            ROOM:       1286  ACCOUNT NO:   [de-identified]                           ADMIT DATE: 2022  PROVIDER:     Nik Arnold MD    CHIEF COMPLAINT:  Altered mental status. HISTORY OF PRESENT ILLNESS:  The patient is a 80-year-old lady, who  resides in [facility]. The patient was sent in from the  nursing home because of increased confusion and altered mental status. The patient had recently been refusing to eat and drink. She was placed  on Marinol 5 mg p.o. b.i.d. to help her appetite. Her baseline  creatinine is around 0.621 and it went up to 1.7 recently and she was  placed on IV fluids in the nursing home. However, she was sent in  because of altered mental status last night. Her creatinine was found  to be up to 3.5. Her evaluation also revealed possible UTI and she was  placed on Rocephin intravenously. She was then admitted for further  evaluation and management. She was placed on IV fluids. PAST MEDICAL HISTORY:  1. The patient has a history of right-sided middle cerebral artery CVA,  status post t-PA after which there was possible extravasation of the dye  versus hemorrhage and brain edema for which the patient had an emergency  right craniotomy. 2.  History of depression. 3.  GERD. 4.  Hypertension. 5.  Paraesophageal hernia. PAST SURGICAL HISTORY:  1. The patient had a colonoscopy in the past.  2.  History of foot surgery in 2013. 3.  EGD done 2014.     MEDICATIONS:  Tylenol 650 every 6 hours as needed, vitamin C 500 mg  twice daily, Lipitor 40 mg daily, calcium carbonate two tablets daily,  Depakote 250 mg three times daily, Colace 100 mg twice daily, Marinol 5  mg b.i.d., Cymbalta 120 mg daily, ferrous sulfate 325 mg daily,  melatonin 5 mg nightly, Ritalin 10 mg four times daily, midodrine 5 mg  three times daily, Zoloft 25 mg daily, vitamin D3 2500 units daily, zinc  50 mg daily. ALLERGIES:  The patient is allergic to BACTRIM. SOCIAL HISTORY:  The patient currently resides in [facility]. FAMILY HISTORY:  Unobtainable. REVIEW OF SYSTEMS:  The patient denies any chest pain, shortness of  breath. No cough or wheeze. No nausea, vomiting, diarrhea, or  constipation. No urinary complaints. PHYSICAL EXAMINATION:  GENERAL APPEARANCE:  A 63-year-old lady, in no acute distress. VITAL SIGNS:  Temperature 98.5, pulse 78 per minute and regular,  respiratory rate 16 per minute, blood pressure 147/103, saturation of  oxygen 98% on room air. HEENT:  Normocephalic, atraumatic. Her pupils are equal and reactive  bilaterally. Extraocular movements are intact. No conjunctival pallor  or icterus appreciated. NECK:  Supple. No lymphadenopathy. No thyromegaly. CHEST:  Clear to auscultation bilaterally. HEART:  Regular rate and rhythm. No murmur, rub, or gallop noted. ABDOMEN:  Soft, nontender. Bowel sounds are positive. No organomegaly  appreciated. EXTREMITIES:  There is no edema, clubbing, or cyanosis. NEUROLOGICAL:  She is awake and alert x2. She does have left-sided  hemiplegia, which is unchanged. LABORATORY DATA:  Sodium 146, potassium 3.7, chloride 110, bicarb 19,  BUN 27, creatinine 3.5, glucose of 104. White cell count 7.3,  hemoglobin 12.5, hematocrit 37.9, platelet count 625. Urinalysis showed  more than 20 wbc's. ASSESSMENT AND PLAN:  1. Acute renal failure. This is most likely secondary to poor p.o.  intake and dehydration and intravascular fluid depletion. The patient  would be placed on IV fluids, normal saline at 100 mL an hour. We will  monitor her electrolytes closely. We will check urine sodium and urine  creatinine.   We will check an ultrasound of the kidneys to rule out  obstruction. 2.  Possible UTI with urine showing more than 20 wbc's. We will check  urine and blood cultures. We will keep her on Rocephin 1 gm IV daily. 3.  Altered mental status secondary to above. This seems to have  improved overnight with IV hydration. We will monitor her closely. 4.  History of CVA with left-sided hemiplegia. 5.  GERD. 6.  Depression. 7.  Loss of appetite. We will continue with Marinol 5 mg b.i.d.  8.  Hypertension. We will monitor her blood pressure for now.         Michelle Edwards MD    D: 02/10/2022 19:56:36       T: 02/10/2022 22:48:49     [id number]  Job#: [id number]     Doc#: [id number]    CC:

## 2022-02-11 NOTE — CONSULTS
Comprehensive Nutrition Assessment    Type and Reason for Visit:  Initial,Consult (Poor po intakes PTA)    Nutrition Recommendations/Plan: Will start ONS BID Ensure Enlive    Nutrition Assessment:  Pt admits w/ altered Mental Status, Poor PO/Fluid intake PTA w/ H/o CVA, Depression. Pt s/p Bilateral stents, Cystoscopy. Pt tolerating diet w/ ~51% po intakes. Will start ONS BID and monitor      Nutrition Related Findings:  LLE +3 pitting edema, Trace RLE edema, phosphorus (L)      Wounds:  None       Current Nutrition Therapies:    ADULT DIET; Dysphagia - Pureed  ADULT ORAL NUTRITION SUPPLEMENT; Breakfast, Dinner; Standard High Calorie/High Protein Oral Supplement    Anthropometric Measures:  · Height: 5' 2\" (157.5 cm)  · Current Body Weight: 127 lb (57.6 kg) (2/11 bed)   · Usual Body Weight:  (no wt hx)     · Ideal Body Weight: 110 lbs; % Ideal Body Weight 115.5 %   · BMI: 23.2  · Adjusted Body Weight:  ; No Adjustment   · BMI Categories: Normal Weight (BMI 18.5-24. 9)       Nutrition Diagnosis:   · Inadequate oral intake related to cognitive or neurological impairment as evidenced by intake 51-75%      Nutrition Interventions:   Food and/or Nutrient Delivery:  Continue Current Diet,Start Oral Nutrition Supplement  Nutrition Education/Counseling:  No recommendation at this time   Coordination of Nutrition Care:  Continue to monitor while inpatient    Goals:  Consume >75% meals/ONS       Nutrition Monitoring and Evaluation:   Behavioral-Environmental Outcomes:  None Identified   Food/Nutrient Intake Outcomes:  Food and Nutrient Intake,Supplement Intake  Physical Signs/Symptoms Outcomes:  Biochemical Data,Chewing or Swallowing,Fluid Status or Edema,Nutrition Focused Physical Findings,Skin,Weight     Discharge Planning:     Too soon to determine     Electronically signed by Pierce Meza RD, LD on 2/11/22 at 2:47 PM EST    Contact: [phone]

## 2022-02-11 NOTE — PROGRESS NOTES
Department of Internal Medicine  General Internal Medicine  Attending Progress Note      SUBJECTIVE:    Seen and examined this morning  Awake and alert today  No complaints  No issues overnight    Medications    Current Facility-Administered Medications: potassium chloride 10 MEQ/100ML IVPB (Peripheral Line), , ,   meperidine (DEMEROL) injection 12.5 mg, 12.5 mg, IntraVENous, Q5 Min PRN  dextrose 5 % solution, 50 mL/hr, IntraVENous, Continuous  potassium & sodium phosphates (PHOS-NAK) 280-160-250 MG packet 250 mg, 1 packet, Oral, BID  magnesium sulfate 2000 mg in 50 mL IVPB premix, 2,000 mg, IntraVENous, Once  heparin (porcine) injection 5,000 Units, 5,000 Units, SubCUTAneous, 3 times per day  sodium bicarbonate tablet 1,300 mg, 1,300 mg, Oral, TID  hydrALAZINE (APRESOLINE) injection 5 mg, 5 mg, IntraVENous, Once  docusate sodium (COLACE) capsule 100 mg, 100 mg, Oral, BID PRN  acetaminophen (TYLENOL) tablet 650 mg, 650 mg, Oral, Q6H PRN  ammonium lactate (LAC-HYDRIN) 12 % lotion, , Topical, PRN  atorvastatin (LIPITOR) tablet 40 mg, 40 mg, Oral, Daily  bisacodyl (DULCOLAX) suppository 10 mg, 10 mg, Rectal, Daily PRN  calcium carbonate (TUMS) chewable tablet 1,000 mg, 2 tablet, Oral, Daily  vitamin D3 (CHOLECALCIFEROL) tablet 2,500 Units, 2,500 Units, Oral, Daily  [Held by provider] divalproex (DEPAKOTE) DR tablet 250 mg, 250 mg, Oral, TID  dronabinol (MARINOL) capsule 5 mg, 5 mg, Oral, BID AC  [Held by provider] DULoxetine (CYMBALTA) extended release capsule 120 mg, 120 mg, Oral, Daily  ferrous sulfate (IRON 325) tablet 325 mg, 325 mg, Oral, Daily with breakfast  melatonin disintegrating tablet 5 mg, 5 mg, Oral, Nightly  metoclopramide (REGLAN) tablet 10 mg, 10 mg, Oral, 4x Daily  therapeutic multivitamin-minerals 1 tablet, 1 tablet, Oral, Daily  polyethylene glycol (GLYCOLAX) packet 17 g, 17 g, Oral, Daily PRN  sertraline (ZOLOFT) tablet 25 mg, 25 mg, Oral, Daily  ascorbic acid (VITAMIN C) tablet 500 mg, 500 mg, Oral, BID  zinc sulfate (ZINCATE) capsule 50 mg, 50 mg, Oral, Daily  cefTRIAXone (ROCEPHIN) 1,000 mg in sterile water 10 mL IV syringe, 1,000 mg, IntraVENous, Q24H    Physical    VITALS:  BP (!) 148/94   Pulse 87   Temp 97.1 °F (36.2 °C) (Infrared)   Resp 19   Ht 5' 2\" (1.575 m)   Wt 127 lb 8 oz (57.8 kg)   SpO2 98%   BMI 23.32 kg/m²   TEMPERATURE:  Current - Temp: 97.1 °F (36.2 °C); Max - Temp  Av.5 °F (36.4 °C)  Min: 97 °F (36.1 °C)  Max: 98.4 °F (36.9 °C)  RESPIRATIONS RANGE: Resp  Avg: 15.5  Min: 7  Max: 26  PULSE RANGE: Pulse  Av.9  Min: 65  Max: 99  BLOOD PRESSURE RANGE:  Systolic (55ZAQ), DRN:628 , Min:113 , CQT:471   ; Diastolic (07BKG), DBS:93, Min:60, Max:124    PULSE OXIMETRY RANGE: SpO2  Av.2 %  Min: 92 %  Max: 100 %  24HR INTAKE/OUTPUT:    Intake/Output Summary (Last 24 hours) at 2022 1716  Last data filed at 2022 1224  Gross per 24 hour   Intake 500 ml   Output 800 ml   Net -300 ml       CONSTITUTIONAL: Awake, no distress, appears as stated age. HEENT: Normocephalic atraumatic. Pupils are equal and reactive bilaterally. Extraocular movements are intact. No pallor or icterus appreciated. NECK: Supple, no JVD , no lymphadenopathy, no bruits, no thyromegaly  LUNGS: Clear to auscultation bilaterally. CARDIOVASCULAR: Regular rate and rhythm, no murmur rub or gallop. ABDOMEN: Soft, nontender, bowel sounds are positive, no organomegaly appreciated. NEUROLOGIC: Awake, alert, oriented x 3 ,left side hemiplegia, receptive aphasia  EXT: No clubbing, or cyanosis.   Trace to +1 edema left leg  No edema rt leg    CBC with Differential:    Lab Results   Component Value Date    WBC 6.7 2022    RBC 3.61 2022    HGB 11.5 2022    HCT 35.1 2022     2022    MCV 97.2 2022    MCH 31.9 2022    MCHC 32.8 2022    RDW 12.1 2022    SEGSPCT 56 2011    LYMPHOPCT 18.7 2022    MONOPCT 9.7 2022    BASOPCT 0.4 2022 MONOSABS 0.65 02/11/2022    LYMPHSABS 1.25 02/11/2022    EOSABS 0.10 02/11/2022    BASOSABS 0.03 02/11/2022     CMP:    Lab Results   Component Value Date     02/11/2022    K 3.9 02/11/2022     02/11/2022    CO2 16 02/11/2022    BUN 8 02/11/2022    CREATININE 0.5 02/11/2022    GFRAA >60 02/11/2022    LABGLOM >60 02/11/2022    GLUCOSE 118 02/11/2022    GLUCOSE 100 05/27/2011    PROT 5.3 02/11/2022    LABALBU 2.5 02/11/2022    LABALBU 4.6 05/27/2011    CALCIUM 8.7 02/11/2022    BILITOT 0.2 02/11/2022    ALKPHOS 84 02/11/2022    AST 19 02/11/2022    ALT 15 02/11/2022         ASSESSMENT AND PLAN      1. Acute renal failure,  pre renal due to poor po intake and post renal due to obstructive uropathy with CT scan abdomen revealing ureteric stones with hydronephrosis  Creatinine down to 0.5 with iv hydration  Nephrology following and replacing electrolytes. 2.Obstructive uropathy due to B/L ureteric stones, plan is for cystoscopy today and possible stent placemnts. Urology following  3.UTI, Rocephin iv, await urine culture results  4. Altered mental status secondary to above, resolved. 5.Loss of appetite ? Secondary to above, Dietary consult for calorie count. 6.Hx of remote CVA with Lt hemiplegia  7. Hx of depression, on Zoloft 25 mg daily. 8.Hyperlipidemia lipitor 40 mg daily  9. Edema lt leg most likely due to disuse, check US r/o DVT.     Oniel Hernandez MD, MD.  5:16 PM  2/11/2022

## 2022-02-11 NOTE — ANESTHESIA PRE PROCEDURE
Department of Anesthesiology  Preprocedure Note       Name:  Myrna Rene   Age:  46 y.o.  :  1970                                          MRN:  [id number]         Date:  2022      Surgeon: Isai Ramsey):  True Chenango A Jabier, DO    Procedure: Procedure(s):  CYSTOSCOPY RETROGRADE PYELOGRAM BILATERAL  STENT INSERTION    Medications prior to admission:   Prior to Admission medications    Medication Sig Start Date End Date Taking? Authorizing Provider   acetaminophen (TYLENOL) 325 MG tablet Take 650 mg by mouth every 6 hours as needed for Pain    Historical Provider, MD   vitamin C (ASCORBIC ACID) 500 MG tablet Take 500 mg by mouth 2 times daily    Historical Provider, MD   baclofen (LIORESAL) 10 MG tablet Take 5 mg by mouth 3 times daily    Historical Provider, MD   calcium carbonate (TUMS) 500 MG chewable tablet Take 2 tablets by mouth daily    Historical Provider, MD   bisacodyl (DULCOLAX) 10 MG suppository Place 10 mg rectally daily as needed for Constipation    Historical Provider, MD   Cholecalciferol (VITAMIN D3) 50 MCG (2000 UT) CAPS Take by mouth    Historical Provider, MD   ferrous sulfate (IRON 325) 325 (65 Fe) MG tablet Take 325 mg by mouth daily (with breakfast)    Historical Provider, MD   gabapentin (NEURONTIN) 100 MG capsule Take 100 mg by mouth daily. Historical Provider, MD   ammonium lactate (LAC-HYDRIN) 12 % lotion Apply topically as needed for Dry Skin Apply topically as needed. Historical Provider, MD   dronabinol (MARINOL) 5 MG capsule Take 5 mg by mouth 2 times daily (before meals).     Historical Provider, MD   melatonin 5 MG TBDP disintegrating tablet Take 5 mg by mouth nightly    Historical Provider, MD   midodrine (PROAMATINE) 5 MG tablet Take 5 mg by mouth 3 times daily    Historical Provider, MD   polyethylene glycol (GLYCOLAX) 17 g packet Take 17 g by mouth daily as needed for Constipation    Historical Provider, MD   Multiple Vitamins-Minerals (THERAPEUTIC MULTIVITAMIN-MINERALS) tablet Take 1 tablet by mouth daily    Historical Provider, MD   omeprazole (PRILOSEC) 40 MG delayed release capsule Take 40 mg by mouth daily    Historical Provider, MD   oxyCODONE-acetaminophen (PERCOCET) 5-325 MG per tablet Take 1 tablet by mouth every 8 hours as needed for Pain. Historical Provider, MD   famotidine (PEPCID) 40 MG tablet Take 40 mg by mouth 2 times daily as needed    Historical Provider, MD   potassium chloride (KLOR-CON) 20 MEQ packet Take 20 mEq by mouth daily    Historical Provider, MD   mirtazapine (REMERON) 15 MG tablet Take 15 mg by mouth nightly    Historical Provider, MD   senna (SENOKOT) 8.6 MG tablet Take 1 tablet by mouth daily    Historical Provider, MD   sertraline (ZOLOFT) 25 MG tablet Take 25 mg by mouth daily    Historical Provider, MD   valproic acid (DEPAKENE) 250 MG capsule Take 500 mg by mouth 3 times daily    Historical Provider, MD   zinc sulfate (ZINCATE) 220 (50 Zn) MG capsule Take 50 mg by mouth daily    Historical Provider, MD   metoclopramide (REGLAN) 10 MG tablet Take 1 tablet by mouth 4 times daily 3/29/19   May IKE Madsen CNP   docusate sodium (COLACE) 100 MG capsule Take 1 capsule by mouth 2 times daily 3/29/19   May IKE Madsen CNP   divalproex (DEPAKOTE) 250 MG DR tablet Take 250 mg by mouth 3 times daily    Historical Provider, MD   atorvastatin (LIPITOR) 40 MG tablet Take 40 mg by mouth daily    Historical Provider, MD   ketorolac (TORADOL) 10 MG tablet Take 1 tablet by mouth every 6 hours as needed for Pain 11/22/18 11/27/18  Martha Bustos PA-C   DULoxetine (CYMBALTA) 60 MG capsule Take 120 mg by mouth daily     Historical Provider, MD   Ranitidine HCl (RANITIDINE 75 PO) Take by mouth    Historical Provider, MD   LOSARTAN POTASSIUM PO Take  by mouth. Historical Provider, MD   MedroxyPROGESTERone Acetate (DEPO-PROVERA IM) Inject 1 mg into the muscle.  Every three months    Historical Provider, MD       Current medications:    Current Facility-Administered Medications   Medication Dose Route Frequency Provider Last Rate Last Admin    potassium chloride 10 MEQ/100ML IVPB (Peripheral Line)             dextrose 5 % and 0.2 % NaCl infusion   IntraVENous Continuous Randell Arriaga MD 75 mL/hr at 02/11/22 0343 New Bag at 02/11/22 0343    sodium bicarbonate tablet 1,300 mg  1,300 mg Oral TID Tank Chance MD   1,300 mg at 02/10/22 2058    hydrALAZINE (APRESOLINE) injection 5 mg  5 mg IntraVENous Once [facility] [employer], DO        docusate sodium (COLACE) capsule 100 mg  100 mg Oral BID PRN Randell Arriaga MD        acetaminophen (TYLENOL) tablet 650 mg  650 mg Oral Q6H PRN Randell Arriaga MD        ammonium lactate (LAC-HYDRIN) 12 % lotion   Topical PRN Randell Arriaga MD        atorvastatin (LIPITOR) tablet 40 mg  40 mg Oral Daily Randell Arriaga MD   40 mg at 02/10/22 0813    bisacodyl (DULCOLAX) suppository 10 mg  10 mg Rectal Daily PRN Randell Arriaga MD        calcium carbonate (TUMS) chewable tablet 1,000 mg  2 tablet Oral Daily Randell Arriaga MD   1,000 mg at 02/10/22 0813    vitamin D3 (CHOLECALCIFEROL) tablet 2,500 Units  2,500 Units Oral Daily Randell Arriaga MD   2,500 Units at 02/10/22 0815    [Held by provider] divalproex (DEPAKOTE) DR tablet 250 mg  250 mg Oral TID Randell Arriaga MD        dronabinol (MARINOL) capsule 5 mg  5 mg Oral BID AC Randell Arriaga MD   5 mg at 02/10/22 1635    [Held by provider] DULoxetine (CYMBALTA) extended release capsule 120 mg  120 mg Oral Daily Randell Arriaga MD        ferrous sulfate (IRON 325) tablet 325 mg  325 mg Oral Daily with breakfast Randell Arriaga MD   325 mg at 02/10/22 0813    melatonin disintegrating tablet 5 mg  5 mg Oral Nightly Bahaa A Awadalla, MD   5 mg at 02/10/22 2058    metoclopramide (REGLAN) tablet 10 mg  10 mg Oral 4x Daily Randell Arriaga MD   10 mg at 02/10/22 2058    midodrine (PROAMATINE) tablet 5 mg  5 mg Oral TID [facility] A Awadalla, MD   5 mg at 02/10/22 0813    therapeutic multivitamin-minerals 1 tablet  1 tablet Oral Daily Deanne Duong MD   1 tablet at 02/10/22 0813    polyethylene glycol (GLYCOLAX) packet 17 g  17 g Oral Daily PRN Deanne Duong MD        sertraline (ZOLOFT) tablet 25 mg  25 mg Oral Daily Reji BARBER Awadalla, MD   25 mg at 02/10/22 0813    ascorbic acid (VITAMIN C) tablet 500 mg  500 mg Oral BID Deanne Duong MD   500 mg at 02/10/22 2058    zinc sulfate (ZINCATE) capsule 50 mg  50 mg Oral Daily Deanne Duong MD   50 mg at 02/10/22 0813    cefTRIAXone (ROCEPHIN) 1,000 mg in sterile water 10 mL IV syringe  1,000 mg IntraVENous Q24H Deanne Duong MD   1,000 mg at 02/10/22 2058       Allergies: Allergies   Allergen Reactions    Bactrim [Sulfamethoxazole-Trimethoprim] Hives       Problem List:    Patient Active Problem List   Diagnosis Code    Hammer toe, acquired M20.40    Altered mental status R41.82       Past Medical History:        Diagnosis Date    Cerebral artery occlusion with cerebral infarction ([facility] [address]     Hypertension     Panic attacks        Past Surgical History:        Procedure Laterality Date    COLONOSCOPY      ENDOSCOPY, COLON, DIAGNOSTIC      FOOT SURGERY      right toes    FOOT SURGERY Left 3/27/13    correction 4th, 5th toes left foot    LAPAROTOMY      UPPER GASTROINTESTINAL ENDOSCOPY  05/20/2014       Social History:    Social History     Tobacco Use    Smoking status: Current Every Day Smoker     Packs/day: 1.00     Years: 20.00     Pack years: 20.00     Types: Cigarettes    Smokeless tobacco: Never Used   Substance Use Topics    Alcohol use:  No                                Ready to quit: Not Answered  Counseling given: Not Answered      Vital Signs (Current):   Vitals:    02/10/22 1615 02/10/22 1745 02/10/22 2100 02/11/22 0830   BP: (!) 134/90  130/60 (!) 155/85   Pulse: 87  95 99   Resp: 20   18   Temp: 98.8 °F (37.1 °C)   98.4 °F (36.9 °C)   TempSrc: Oral   Oral   SpO2: 99%   99%   Weight:       Height:  5' 2.21\" (1.58 m)                                                BP Readings from Last 3 Encounters:   02/11/22 (!) 155/85   05/08/19 (!) 140/85   03/29/19 105/70       NPO Status:  GREATER THAN 8 HOURS                                                                               BMI:   Wt Readings from Last 3 Encounters:   02/09/22 127 lb 8 oz (57.8 kg)   05/08/19 160 lb (72.6 kg)   03/29/19 183 lb 9 oz (83.3 kg)     Body mass index is 23.17 kg/m². CBC:   Lab Results   Component Value Date    WBC 6.7 02/11/2022    RBC 3.61 02/11/2022    HGB 11.5 02/11/2022    HCT 35.1 02/11/2022    MCV 97.2 02/11/2022    RDW 12.1 02/11/2022     02/11/2022       CMP:   Lab Results   Component Value Date     02/11/2022    K 3.9 02/11/2022     02/11/2022    CO2 19 02/11/2022    BUN 12 02/11/2022    CREATININE 0.6 02/11/2022    GFRAA >60 02/11/2022    LABGLOM >60 02/11/2022    GLUCOSE 77 02/11/2022    GLUCOSE 100 05/27/2011    PROT 5.3 02/11/2022    CALCIUM 8.4 02/11/2022    BILITOT 0.2 02/11/2022    ALKPHOS 84 02/11/2022    AST 19 02/11/2022    ALT 15 02/11/2022       POC Tests: No results for input(s): POCGLU, POCNA, POCK, POCCL, POCBUN, POCHEMO, POCHCT in the last 72 hours.     Coags:   Lab Results   Component Value Date    PROTIME 13.6 02/09/2022    INR 1.2 02/09/2022    APTT 29.2 02/09/2022       HCG (If Applicable):   Lab Results   Component Value Date    PREGTESTUR neg 03/27/2013        ABGs: No results found for: PHART, PO2ART, TNO9KQC, YXM6VLD, BEART, O7KFOOME     Type & Screen (If Applicable):  No results found for: LABABO, LABRH    Drug/Infectious Status (If Applicable):  No results found for: HIV, HEPCAB    COVID-19 Screening (If Applicable):   Lab Results   Component Value Date    COVID19 Not Detected 02/09/2022           Anesthesia Evaluation  Patient summary reviewed no history of anesthetic complications:   Airway: Mallampati: III  TM distance: <3 FB   Neck ROM: full  Mouth opening: < 3 FB Dental: normal exam         Pulmonary:Negative Pulmonary ROS breath sounds clear to auscultation                             Cardiovascular:    (+) hypertension:,         Rhythm: regular             Beta Blocker:  Not on Beta Blocker         Neuro/Psych:   (+) CVA:, neuromuscular disease (PANIC ATTACKS):,              ROS comment: ALTERED MENTAL STATUS  GI/Hepatic/Renal:   (+) renal disease: kidney stones,           Endo/Other: Negative Endo/Other ROS                    Abdominal:             Vascular: negative vascular ROS. Other Findings:           Anesthesia Plan      MAC     ASA 3     (PHONE PERMISSION OBTAINED BY POA)  Induction: intravenous. MIPS: Postoperative opioids intended and Prophylactic antiemetics administered. Anesthetic plan and risks discussed with patient. Plan discussed with CRNA.           [address] Liu León,    2/11/2022

## 2022-02-11 NOTE — CARE COORDINATION
Ss note:2/11/2022.2:08 PM Neg covid on 2-9-2022. RAPID COVID ORDERED TODAY. Pt presented from [facility] and came to hospital with a PICC LINE. Per symone Anguiano at [facility], pt accepted, Caity Hernandez is waived. Charge nurse aware and will ask physician for discharge. Pt CAN admit to facility today or over the weekend. Will need discharge order, HENS and signed Raulito León. Family will need notified, Andrés Sanchez [phone] when discharge is written.  KAROLINA Willett

## 2022-02-11 NOTE — ANESTHESIA POSTPROCEDURE EVALUATION
Department of Anesthesiology  Postprocedure Note    Patient: Mayelin Benavides  MRN: [id number]  YOB: 1970  Date of evaluation: 2/11/2022  Time:  1:37 PM     Procedure Summary     Date: 02/11/22 Room / Location: [facility] [address]  [address] [facility]    Anesthesia Start: 1053 Anesthesia Stop: 1134    Procedure: CYSTOSCOPY RETROGRADE PYELOGRAM BILATERAL  STENT INSERTION (Bilateral Ureter) Diagnosis: (ALTERED MENTAL STATUS)    Surgeons: Aidan Eugene DO Responsible Provider: Ashly Batres DO    Anesthesia Type: MAC ASA Status: 3          Anesthesia Type: MAC    Radha Phase I: Radha Score: 9    Radha Phase II:      Last vitals: Reviewed and per EMR flowsheets.        Anesthesia Post Evaluation    Patient location during evaluation: PACU  Patient participation: complete - patient participated  Level of consciousness: awake and alert  Airway patency: patent  Nausea & Vomiting: no nausea and no vomiting  Complications: no  Cardiovascular status: hemodynamically stable  Respiratory status: acceptable  Hydration status: euvolemic

## 2022-02-11 NOTE — BRIEF OP NOTE
Brief Postoperative Note      Patient: Laurie Najera  YOB: 1970  MRN: [id number]    Date of Procedure: 2/11/2022    Pre-Op Diagnosis: ALTERED MENTAL STATUS,  Bilateral ureteral stones    Post-Op Diagnosis: Same       Procedure(s):  CYSTOSCOPY RETROGRADE PYELOGRAM BILATERAL  STENT INSERTION    Surgeon(s):  Pedrito Eugene DO    Assistant:  * No surgical staff found *    Anesthesia: Monitor Anesthesia Care    Estimated Blood Loss (mL): Minimal    Complications: None    Specimens:   * No specimens in log *    Implants:  * No implants in log *      Drains:   Urethral Catheter Latex (Active)   Urine Color Yellow 02/11/22 0830   Urine Appearance Hazy 02/11/22 0830   Output (mL) 300 mL 02/11/22 0349       [REMOVED] Urethral Catheter Straight-tip (Removed)   Catheter Indications Need for fluid volume management of the critically ill patient in a critical care setting 02/09/22 1346       Findings: see operative report     Electronically signed by Pedrito Eugene DO on 2/11/2022 at 10:21 AM

## 2022-02-11 NOTE — DISCHARGE INSTR - COC
Continuity of Care Form    Patient Name: Uday Mcdowell   :  1970  MRN:  [id number]    Admit date:  2022  Discharge date:  22    Code Status Order: Full Code   Advance Directives:      Admitting Physician:  Ernesto Cabrales MD  PCP: Ernesto Cabrales MD    Discharging Nurse: [facility] Unit/Room#: 4743/6337-68  Discharging Unit Phone Number: [phone]    Emergency Contact:   Extended Emergency Contact Information  Primary Emergency Contact: Sergey Chiang  Address: [address] [place] [address]  Home Phone: [phone]  Mobile Phone: [phone]  Relation: Aunt/Uncle    Past Surgical History:  Past Surgical History:   Procedure Laterality Date    COLONOSCOPY      ENDOSCOPY, COLON, DIAGNOSTIC      FOOT SURGERY      right toes    FOOT SURGERY Left 3/27/13    correction 4th, 5th toes left foot    LAPAROTOMY      UPPER GASTROINTESTINAL ENDOSCOPY  2014       Immunization History:   Immunization History   Administered Date(s) Administered    Tdap (Boostrix, Adacel) 2019       Active Problems:  Patient Active Problem List   Diagnosis Code    Hammer toe, acquired M20.40    Altered mental status R41.82       Isolation/Infection:   Isolation            No Isolation          Patient Infection Status       Infection Onset Added Last Indicated Last Indicated By Review Planned Expiration Resolved Resolved By    None active    Resolved    COVID-19 (Rule Out) 22 COVID-19, Rapid (Ordered)   22 Rule-Out Test Resulted            Nurse Assessment:  Last Vital Signs: BP (!) 148/94   Pulse 87   Temp 97.1 °F (36.2 °C) (Infrared)   Resp 19   Ht 5' 2.21\" (1.58 m)   Wt 127 lb 8 oz (57.8 kg)   SpO2 98%   BMI 23.17 kg/m²     Last documented pain score (0-10 scale): Pain Level: 4  Last Weight:   Wt Readings from Last 1 Encounters:   22 127 lb 8 oz (57.8 kg)     Mental Status:  oriented, alert, and coherent    IV Access:  - PICC - site  R Upper Arm, insertion date: 2/9/2022    Nursing Mobility/ADLs:  Walking   Dependent  Transfer  [address]  Dependent  Med Delivery   whole    Wound Care Documentation and Therapy:        Elimination:  Continence: Bowel: No  Bladder: No  Urinary Catheter: None   Colostomy/Ileostomy/Ileal Conduit: No       Date of Last BM: 2/11/2022    Intake/Output Summary (Last 24 hours) at 2/11/2022 1251  Last data filed at 2/11/2022 1224  Gross per 24 hour   Intake 500 ml   Output 800 ml   Net -300 ml     I/O last 3 completed shifts: In: 360 [P.O.:360]  Out: 600 [Urine:600]    Safety Concerns: At Risk for Falls and Aspiration Risk    Impairments/Disabilities:      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   Ventilator:    - No ventilator support    Rehab Therapies: Physical Therapy, Occupational Therapy, and Speech/Language Therapy  Weight Bearing Status/Restrictions: No weight bearing restirctions  Other Medical Equipment (for information only, NOT a DME order):  hospital bed  Other Treatments: none    Patient's personal belongings (please select all that are sent with patient):  None    RN SIGNATURE:  Electronically signed by Kathleen Peña RN on 2/12/22 at 4:46 PM EST    CASE MANAGEMENT/SOCIAL WORK SECTION    Inpatient Status Date:     Readmission Risk Assessment Score:  Readmission Risk              Risk of Unplanned Readmission:  18           Discharging to Facility/ Agency   Name:  [facility]  Address:  [address] [place] [address] [place] [id number]  Phone:  [phone]  Fax: [phone]    Dialysis Facility (if applicable)   Name:  Address:  Dialysis Schedule:  Phone:  Fax:    / signature: Electronically signed by KAROLINA Argueta on 2/11/22 at 12:53 PM EST    PHYSICIAN SECTION    Prognosis: Good    Condition at Discharge: Stable    Rehab Potential (if transferring to Rehab): Good    Recommended Labs or Other Treatments After Discharge:     Physician Certification: I certify the above information and transfer of Eri Saucedo  is necessary for the continuing treatment of the diagnosis listed and that she requires [facility] for {GREATER/LESS:008282300} 30 days.      Update Admission H&P: {CHP DME Changes in [facility]:[id number]}    PHYSICIAN SIGNATURE:  Electronically signed by Katie Bowen MD on 2/12/22 at 4:31 PM EST

## 2022-02-11 NOTE — PLAN OF CARE
Problem: Skin Integrity:  Goal: Will show no infection signs and symptoms  Description: Will show no infection signs and symptoms  2/10/2022 2114 by Lambert Clay RN  Outcome: Met This Shift     Problem: Skin Integrity:  Goal: Absence of new skin breakdown  Description: Absence of new skin breakdown  2/10/2022 2114 by Lambert Clay RN  Outcome: Met This Shift

## 2022-02-11 NOTE — PROGRESS NOTES
Nephrology Progress Note  [facility]     Reason for Consult: AMBROCIO  Requesting Physician: Dr Clarita Darnell  Date of Service: 2/11/2022   Chief Complaint:  AMS  History Obtained From:  Patient, medical record      History of Present Ilness:    FROM 2-10    Ms Hayley Oates is a 46year old female who we are consulted on for evaluation and management of acute kidney injury    She was seen and examined in her room. She presented to the hospital on 2-9 with altered mental status from her nursing home. She cannot provide a clear history. She does have history of CVA. She denies any pain or nausea or shortness of breath. She states she has poor appetite. She has not history of kidney disease. Last creatinine was 1.0 from March 2020. Her creatinine was 3.5 on admission. SUBJECTIVE  More awake and interactive today. She still has expressive aphasia. States she has a poor appetite. Does appear confused with some of her comments.   No pain or shortness of breath or nausea        Past Medical History:        Diagnosis Date    Cerebral artery occlusion with cerebral infarction ([facility] [address]     Hypertension     Panic attacks        Past Surgical History:        Procedure Laterality Date    COLONOSCOPY      ENDOSCOPY, COLON, DIAGNOSTIC      FOOT SURGERY      right toes    FOOT SURGERY Left 3/27/13    correction 4th, 5th toes left foot    LAPAROTOMY      UPPER GASTROINTESTINAL ENDOSCOPY  05/20/2014       Current Medications:    Current Facility-Administered Medications: potassium chloride 10 MEQ/100ML IVPB (Peripheral Line), , ,   meperidine (DEMEROL) injection 12.5 mg, 12.5 mg, IntraVENous, Q5 Min PRN  sodium bicarbonate tablet 1,300 mg, 1,300 mg, Oral, TID  hydrALAZINE (APRESOLINE) injection 5 mg, 5 mg, IntraVENous, Once  docusate sodium (COLACE) capsule 100 mg, 100 mg, Oral, BID PRN  acetaminophen (TYLENOL) tablet 650 mg, 650 mg, Oral, Q6H PRN  ammonium lactate (LAC-HYDRIN) 12 % lotion, , Topical, Skin: no rash, tugor wnl  Neuro: left sided weakness   Psych: cooperative             Data:       Last 3 CMP:    Recent Labs     02/09/22  1135 02/09/22  1135 02/10/22  0616 02/10/22  2258 02/11/22  0745      < > 148* 147* 148*   K 3.7   < > 3.6 2.8* 3.9   *   < > 117* 117* 119*   CO2 19*   < > 16* 18* 19*   BUN 27*   < > 22* 14 12   CREATININE 3.5*   < > 2.2* 0.8 0.6   GLUCOSE 104*   < > 73* 116* 77   CALCIUM 9.0   < > 8.5* 8.2* 8.4*   PROT 6.1*  --  5.7*  --  5.3*   LABALBU 2.9*  --  2.7*  --  2.5*   BILITOT <0.2  --  <0.2  --  0.2   ALKPHOS 89  --  76  --  84   AST 13  --  11  --  19   ALT 16  --  9  --  15    < > = values in this interval not displayed. Last 3 Glucose:     Recent Labs     02/10/22  0616 02/10/22  2258 02/11/22  0745   GLUCOSE 73* 116* 77         Last 3 POC Glucose:     No results for input(s): POCGLU in the last 72 hours. Last 3 CK, CKMB, Troponin  No results for input(s): CKTOTAL, CKMB, TROPONINI in the last 72 hours. Last 3 CBC:  Recent Labs     02/09/22  1135 02/10/22  0616 02/11/22  0745   WBC 7.3 5.7 6.7   RBC 3.88 3.93 3.61   HGB 12.5 12.7 11.5   HCT 37.9 38.2 35.1   MCV 97.7 97.2 97.2   MCH 32.2 32.3 31.9   MCHC 33.0 33.2 32.8   RDW 11.6 11.8 12.1    240 227   MPV 9.7 9.6 10.1       Last 3 Hepatic Function Panel:    Recent Labs     02/09/22  1135 02/10/22  0616 02/11/22  0745   ALKPHOS 89 76 84   ALT 16 9 15   AST 13 11 19   PROT 6.1* 5.7* 5.3*   BILITOT <0.2 <0.2 0.2   LABALBU 2.9* 2.7* 2.5*       Albumin:  Recent Labs     02/11/22  0745   LABALBU 2.5*       Calcium:  Recent Labs     02/11/22  0745   CALCIUM 8.4*       Ionized Calcium:  No results for input(s): IONCA in the last 72 hours. Magnesium:    Recent Labs     02/11/22  0745   MG 1.6         ABGs:  No results for input(s): PHART, PO2ART, YEJ1YOE, KOK6IUI, BEART, X7RTNDFD in the last 72 hours. Lactic Acid:  No results for input(s): LACTA in the last 72 hours. Last 3 Amylase:   No results for input(s): AMYLASE in the last 72 hours. Last 3 Lipase:  No results for input(s): LIPASE in the last 72 hours. Last 3 BNP:  No results for input(s): BNP in the last 72 hours. US RETROPERITONEAL COMPLETE [[id number]] Collected: 02/09/22 2143     Order Status: Completed Updated: 02/09/22 2150     Narrative:       EXAMINATION:   RETROPERITONEAL ULTRASOUND OF THE KIDNEYS AND URINARY BLADDER     2/9/2022     COMPARISON:   None     HISTORY:   ORDERING SYSTEM PROVIDED HISTORY: AMBROCIO   TECHNOLOGIST PROVIDED HISTORY:     Reason for exam:->AMBROCIO   What reading provider will be dictating this exam?->CRC     FINDINGS:   The right kidney measures 10.1 x 6.1 x 6.0 cm.  Left kidney measures 10.5 x   5.2 x 5.2 cm. There is mild right hydronephrosis.  There may be minimal left hydronephrosis. Bladder appears unremarkable.  Bilateral ureteral jets visualized.      Impression:       Mild right and minimal left hydronephrosis. Bilateral ureteral jets visualized within the bladder.  I cannot confirm   obstructive uropathy. Assessment/Plan      1 Acute kidney injury  Creatinine 3.5 on admission with last creatinine 1.0 from March 2020    Most likely due to changes in perfusion with decreased oral intake. She is on ARB and NSAIDs her report. She had pollakc placed. She has bilateral hydronephrosis on imagining. UA with no protein, no RBCs, >20 WBCs     Give IV fluids  Follow renal function, urine output  Urology consulted for hydronephrosis; underwent cystoscopy with bilateral stent placement on 2/11  Hold ARB  Avoid NSAIDs  Follow urine culture     Renal function improved         2 Hypernatremia  With decreased intake and isotonic fluids  Changed to hypotonic fluids on February 10.   Will adjust fluids again today  Encourage oral intake if safe    3 Metabolic acidosis  With renal failure and chloride rich IV fluids  IV fluids adjusted    Give bicarbonate, though will likely not require chronically     4 Hypokalemia  Received replacement this morning  Follow potassium and treat further as needed    5. Hypomagnesemia  Replace and follow    6.   Hypophosphatemia  Replace and follow      Thank you for the opportunity to participate in the care of Ms Kirstin Birch    ______________________________      Marcos Yarbrough MD  2/11/2022  3:58 PM

## 2022-02-11 NOTE — PROGRESS NOTES
2/11/2022 8:07 AM  Service: Urology  Group: [facility] (Jabier/Tabatha/Pat)    Myrna Rene  [id number]    Subjective:    Awake and alert  Answers questions  Denies any pain  Feliz catheter draining yellow urine  Previous CVA with left-sided weakness    Review of Systems  Constitutional: No fever or chills   Respiratory: negative for cough and hemoptysis  Cardiovascular: negative for chest pain and dyspnea  Gastrointestinal: negative for abdominal pain, diarrhea, nausea and vomiting   : See above  Derm: negative for rash and skin lesion(s)  Neurological: Previous CVA with left-sided deficits  Musculoskeletal: Negative    Psychiatric: Negative   All other reviews are negative      Scheduled Meds:   potassium chloride  20 mEq IntraVENous Q1H    potassium chloride        sodium bicarbonate  1,300 mg Oral TID    hydrALAZINE  5 mg IntraVENous Once    atorvastatin  40 mg Oral Daily    calcium carbonate  2 tablet Oral Daily    vitamin D3  2,500 Units Oral Daily    [Held by provider] divalproex  250 mg Oral TID    dronabinol  5 mg Oral BID AC    [Held by provider] DULoxetine  120 mg Oral Daily    ferrous sulfate  325 mg Oral Daily with breakfast    melatonin  5 mg Oral Nightly    metoclopramide  10 mg Oral 4x Daily    midodrine  5 mg Oral TID    therapeutic multivitamin-minerals  1 tablet Oral Daily    sertraline  25 mg Oral Daily    vitamin C  500 mg Oral BID    zinc sulfate  50 mg Oral Daily    cefTRIAXone (ROCEPHIN) IV  1,000 mg IntraVENous Q24H       Objective:  Vitals:    02/10/22 2100   BP: 130/60   Pulse: 95   Resp:    Temp:    SpO2:          Allergies: Bactrim [sulfamethoxazole-trimethoprim]    General Appearance: alert and oriented to person, place and time and in no acute distress  Skin: no rash or erythema  Head: normocephalic and atraumatic  Pulmonary/Chest: normal air movement, no respiratory distress  Abdomen: soft, non-tender, non-distended  Genitourinary:  Feliz catheter draining yellow urine  Extremities: no cyanosis, clubbing or edema         Labs:     Recent Labs     02/10/22  2258   *   K 2.8*   *   CO2 18*   BUN 14   CREATININE 0.8   GLUCOSE 116*   CALCIUM 8.2*       Lab Results   Component Value Date    HGB 12.7 02/10/2022    HCT 38.2 02/10/2022       No results found for: PSA    Narrative   EXAMINATION:   CT OF THE ABDOMEN AND PELVIS WITHOUT CONTRAST 2/10/2022 12:06 pm       TECHNIQUE:   CT of the abdomen and pelvis was performed without the administration of   intravenous contrast. Multiplanar reformatted images are provided for review.    Dose modulation, iterative reconstruction, and/or weight based adjustment of   the mA/kV was utilized to reduce the radiation dose to as low as reasonably   achievable.       COMPARISON:   None.       HISTORY:   ORDERING SYSTEM PROVIDED HISTORY: rule out obstructive uropathy   TECHNOLOGIST PROVIDED HISTORY:   Reason for exam:->rule out obstructive uropathy   Additional Contrast?->None       FINDINGS:   Lower Chest: Lung bases demonstrate trace left pleural effusion.       Organs: Liver without focal lesion.  Gallbladder is questionably distended   without discrete wall thickening demonstrating likely sludge versus   microlithiasis in the dependent portion.  No biliary dilatation.  Pancreas   and spleen unremarkable.  Adrenals without nodule.       GI/Bowel: No focal thickening or disproportionate dilatation of bowel.       Pelvis: No suspicious pelvic mass lesion with Feliz catheter inside of a   decompressed urinary bladder.       Peritoneum/Retroperitoneum: Kidneys demonstrate bilateral prominence of the   renal collecting systems with bilateral nephrolithiasis and bilateral   obstructing urolithiasis including rounded stone 15 mm series 500, image 37   in the right renal collecting system at the ureteropelvic junction with   upstream dilatation and moderate hydronephrosis.  Left renal collecting   system is dilated as well with mild-to-moderate hydronephrosis and a left   proximal/mid ureteral 11 mm calculus series 5, image 39.       Bones/Soft Tissues: No acute osseous or soft tissue findings other than mild   body wall edema.           Impression   Bilateral obstructing nephrolithiasis with right UPJ and left mid ureteral   stones present with upstream dilatation and background nephrolithiasis   bilaterally in the kidneys.       RECOMMENDATIONS:   Unavailable                     Assessment/Plan:  Bilateral hydronephrosis   11mm left mid ureteral calculi   15mm right UPJ stone   Bilateral renal calculi   AMBROCIO, resolved     AMBROCIO has resolved, 3.5>>0.8  Cultures pending  Antibiotics per primary, on Rocephin  CT abdomen pelvis performed yesterday showed bilateral renal calculi, bilateral hydronephrosis secondary to bilateral obstructing ureteral calculi. There is an 11 mm left mid ureteral calculi and a 15 mm right UPJ calculi. I did call and discuss results with her uncle Jeronimo Bhatia. She will be kept n.p.o. We will proceed to the OR today for cystoscopy, retrograde pyelogram, bilateral stent insertion  She will need definitive stone management as an outpatient at a later date.   She and her uncle both consented to the above        Susy Chin, APRN - CNP   [facility]  Urology

## 2022-02-12 ENCOUNTER — APPOINTMENT (OUTPATIENT)
Dept: ULTRASOUND IMAGING | Age: 52
DRG: 660 | End: 2022-02-12
Payer: COMMERCIAL

## 2022-02-12 VITALS
HEART RATE: 102 BPM | SYSTOLIC BLOOD PRESSURE: 126 MMHG | HEIGHT: 62 IN | WEIGHT: 127.5 LBS | OXYGEN SATURATION: 97 % | TEMPERATURE: 98.5 F | RESPIRATION RATE: 18 BRPM | DIASTOLIC BLOOD PRESSURE: 84 MMHG | BODY MASS INDEX: 23.46 KG/M2

## 2022-02-12 LAB
ALBUMIN SERPL-MCNC: 2.6 G/DL (ref 3.5–5.2)
ALP BLD-CCNC: 88 U/L (ref 35–104)
ALT SERPL-CCNC: 11 U/L (ref 0–32)
ANION GAP SERPL CALCULATED.3IONS-SCNC: 14 MMOL/L (ref 7–16)
AST SERPL-CCNC: 17 U/L (ref 0–31)
BASOPHILS ABSOLUTE: 0.04 E9/L (ref 0–0.2)
BASOPHILS RELATIVE PERCENT: 0.5 % (ref 0–2)
BILIRUB SERPL-MCNC: 0.2 MG/DL (ref 0–1.2)
BUN BLDV-MCNC: 5 MG/DL (ref 6–20)
BURR CELLS: NORMAL
CALCIUM SERPL-MCNC: 8.3 MG/DL (ref 8.6–10.2)
CHLORIDE BLD-SCNC: 109 MMOL/L (ref 98–107)
CO2: 19 MMOL/L (ref 22–29)
CREAT SERPL-MCNC: 0.4 MG/DL (ref 0.5–1)
EOSINOPHILS ABSOLUTE: 0.15 E9/L (ref 0.05–0.5)
EOSINOPHILS RELATIVE PERCENT: 2 % (ref 0–6)
GFR AFRICAN AMERICAN: >60
GFR NON-AFRICAN AMERICAN: >60 ML/MIN/1.73
GLUCOSE BLD-MCNC: 95 MG/DL (ref 74–99)
HCT VFR BLD CALC: 34.3 % (ref 34–48)
HEMOGLOBIN: 11.7 G/DL (ref 11.5–15.5)
IMMATURE GRANULOCYTES #: 0.13 E9/L
IMMATURE GRANULOCYTES %: 1.7 % (ref 0–5)
LYMPHOCYTES ABSOLUTE: 2.2 E9/L (ref 1.5–4)
LYMPHOCYTES RELATIVE PERCENT: 29 % (ref 20–42)
MAGNESIUM: 1.6 MG/DL (ref 1.6–2.6)
MCH RBC QN AUTO: 32.2 PG (ref 26–35)
MCHC RBC AUTO-ENTMCNC: 34.1 % (ref 32–34.5)
MCV RBC AUTO: 94.5 FL (ref 80–99.9)
METER GLUCOSE: 135 MG/DL (ref 74–99)
MONOCYTES ABSOLUTE: 0.64 E9/L (ref 0.1–0.95)
MONOCYTES RELATIVE PERCENT: 8.4 % (ref 2–12)
NEUTROPHILS ABSOLUTE: 4.43 E9/L (ref 1.8–7.3)
NEUTROPHILS RELATIVE PERCENT: 58.4 % (ref 43–80)
OVALOCYTES: NORMAL
PDW BLD-RTO: 12 FL (ref 11.5–15)
PHOSPHORUS: 2.6 MG/DL (ref 2.5–4.5)
PLATELET # BLD: 160 E9/L (ref 130–450)
PMV BLD AUTO: 11.6 FL (ref 7–12)
POIKILOCYTES: NORMAL
POTASSIUM SERPL-SCNC: 3.7 MMOL/L (ref 3.5–5)
RBC # BLD: 3.63 E12/L (ref 3.5–5.5)
SODIUM BLD-SCNC: 142 MMOL/L (ref 132–146)
TOTAL PROTEIN: 5.2 G/DL (ref 6.4–8.3)
URINE CULTURE, ROUTINE: NORMAL
WBC # BLD: 7.6 E9/L (ref 4.5–11.5)

## 2022-02-12 PROCEDURE — 85025 COMPLETE CBC W/AUTO DIFF WBC: CPT

## 2022-02-12 PROCEDURE — 83735 ASSAY OF MAGNESIUM: CPT

## 2022-02-12 PROCEDURE — 84100 ASSAY OF PHOSPHORUS: CPT

## 2022-02-12 PROCEDURE — 93970 EXTREMITY STUDY: CPT

## 2022-02-12 PROCEDURE — 6370000000 HC RX 637 (ALT 250 FOR IP): Performed by: INTERNAL MEDICINE

## 2022-02-12 PROCEDURE — 36415 COLL VENOUS BLD VENIPUNCTURE: CPT

## 2022-02-12 PROCEDURE — 2580000003 HC RX 258: Performed by: INTERNAL MEDICINE

## 2022-02-12 PROCEDURE — 80053 COMPREHEN METABOLIC PANEL: CPT

## 2022-02-12 PROCEDURE — 6360000002 HC RX W HCPCS: Performed by: INTERNAL MEDICINE

## 2022-02-12 PROCEDURE — 82962 GLUCOSE BLOOD TEST: CPT

## 2022-02-12 RX ORDER — DOCUSATE SODIUM 100 MG/1
100 CAPSULE, LIQUID FILLED ORAL 2 TIMES DAILY PRN
Qty: 60 CAPSULE | Refills: 1 | Status: SHIPPED | OUTPATIENT
Start: 2022-02-12

## 2022-02-12 RX ADMIN — OXYCODONE HYDROCHLORIDE AND ACETAMINOPHEN 500 MG: 500 TABLET ORAL at 10:19

## 2022-02-12 RX ADMIN — Medication 250 MG: at 17:32

## 2022-02-12 RX ADMIN — HEPARIN SODIUM 5000 UNITS: 5000 INJECTION INTRAVENOUS; SUBCUTANEOUS at 13:41

## 2022-02-12 RX ADMIN — SODIUM BICARBONATE 650 MG TABLET 1300 MG: at 13:41

## 2022-02-12 RX ADMIN — MULTIPLE VITAMINS W/ MINERALS TAB 1 TABLET: TAB at 10:36

## 2022-02-12 RX ADMIN — SODIUM BICARBONATE 650 MG TABLET 1300 MG: at 10:35

## 2022-02-12 RX ADMIN — FERROUS SULFATE TAB 325 MG (65 MG ELEMENTAL FE) 325 MG: 325 (65 FE) TAB at 11:03

## 2022-02-12 RX ADMIN — SODIUM BICARBONATE 650 MG TABLET 1300 MG: at 20:33

## 2022-02-12 RX ADMIN — METOCLOPRAMIDE 10 MG: 10 TABLET ORAL at 12:58

## 2022-02-12 RX ADMIN — HEPARIN SODIUM 5000 UNITS: 5000 INJECTION INTRAVENOUS; SUBCUTANEOUS at 20:37

## 2022-02-12 RX ADMIN — ATORVASTATIN CALCIUM 40 MG: 40 TABLET, FILM COATED ORAL at 10:36

## 2022-02-12 RX ADMIN — METOCLOPRAMIDE 10 MG: 10 TABLET ORAL at 17:32

## 2022-02-12 RX ADMIN — SERTRALINE 25 MG: 50 TABLET, FILM COATED ORAL at 10:40

## 2022-02-12 RX ADMIN — DOCUSATE SODIUM 100 MG: 100 CAPSULE, LIQUID FILLED ORAL at 10:36

## 2022-02-12 RX ADMIN — OXYCODONE HYDROCHLORIDE AND ACETAMINOPHEN 500 MG: 500 TABLET ORAL at 20:33

## 2022-02-12 RX ADMIN — Medication 5 MG: at 20:33

## 2022-02-12 RX ADMIN — METOCLOPRAMIDE 10 MG: 10 TABLET ORAL at 20:33

## 2022-02-12 RX ADMIN — DRONABINOL 5 MG: 2.5 CAPSULE ORAL at 05:46

## 2022-02-12 RX ADMIN — WATER 1000 MG: 1 INJECTION INTRAMUSCULAR; INTRAVENOUS; SUBCUTANEOUS at 18:47

## 2022-02-12 RX ADMIN — METOCLOPRAMIDE 10 MG: 10 TABLET ORAL at 10:38

## 2022-02-12 RX ADMIN — Medication 2500 UNITS: at 10:19

## 2022-02-12 RX ADMIN — ZINC SULFATE 220 MG (50 MG) CAPSULE 50 MG: CAPSULE at 10:19

## 2022-02-12 RX ADMIN — HEPARIN SODIUM 5000 UNITS: 5000 INJECTION INTRAVENOUS; SUBCUTANEOUS at 05:48

## 2022-02-12 RX ADMIN — DRONABINOL 5 MG: 2.5 CAPSULE ORAL at 17:32

## 2022-02-12 RX ADMIN — CALCIUM CARBONATE (ANTACID) CHEW TAB 500 MG 1000 MG: 500 CHEW TAB at 10:36

## 2022-02-12 RX ADMIN — Medication 250 MG: at 10:39

## 2022-02-12 NOTE — PROGRESS NOTES
[facility]  PROGRESS NOTE    Chief Complaint:   Bilateral ureteral stones/Bilateral hydronephrosis/Bilateral renal calculi     HPI:   She is tired but feeling better. She denies any pain. She would like the catheter removed. She hopes to go home today. Vitals:    02/12/22 1015   BP: (!) 132/96   Pulse: 85   Resp: 18   Temp: 98.2 °F (36.8 °C)   SpO2: 98%       Allergies: Bactrim [sulfamethoxazole-trimethoprim]    PAST MEDICAL HISTORY:   Past Medical History:   Diagnosis Date    Cerebral artery occlusion with cerebral infarction ([facility] [address]     Hypertension     Panic attacks        PAST SURGICAL HISTORY:   Past Surgical History:   Procedure Laterality Date    COLONOSCOPY      ENDOSCOPY, COLON, DIAGNOSTIC      FOOT SURGERY      right toes    FOOT SURGERY Left 3/27/13    correction 4th, 5th toes left foot    LAPAROTOMY      UPPER GASTROINTESTINAL ENDOSCOPY  05/20/2014        PAST FAMILY HISTORY:  History reviewed. No pertinent family history. PAST SOCIAL HISTORY:    Social History     Socioeconomic History    Marital status: Single     Spouse name: None    Number of children: None    Years of education: None    Highest education level: None   Occupational History    None   Tobacco Use    Smoking status: Current Every Day Smoker     Packs/day: 1.00     Years: 20.00     Pack years: 20.00     Types: Cigarettes    Smokeless tobacco: Never Used   Substance and Sexual Activity    Alcohol use: No    Drug use: No    Sexual activity: None   Other Topics Concern    None   Social History Narrative    None     Social Determinants of Health     Financial Resource Strain:     Difficulty of Paying Living Expenses: Not on file   Food Insecurity:     Worried About Running Out of Food in the Last Year: Not on file    Felix of Food in the Last Year: Not on file   Transportation Needs:     Lack of Transportation (Medical): Not on file    Lack of Transportation (Non-Medical):  Not on file   Physical Activity:  Days of Exercise per Week: Not on file    Minutes of Exercise per Session: Not on file   Stress:     Feeling of Stress : Not on file   Social Connections:     Frequency of Communication with Friends and Family: Not on file    Frequency of Social Gatherings with Friends and Family: Not on file    Attends Jehovah's witness Services: Not on file    Active Member of Clubs or Organizations: Not on file    Attends Club or Organization Meetings: Not on file    Marital Status: Not on file   Intimate Partner Violence:     Fear of Current or Ex-Partner: Not on file    Emotionally Abused: Not on file    Physically Abused: Not on file    Sexually Abused: Not on file   Housing Stability:     Unable to Pay for Housing in the Last Year: Not on file    Number of Places Lived in the Last Year: Not on file    Unstable Housing in the Last Year: Not on file       IV:    dextrose 50 mL/hr (02/11/22 7129)       PRN: meperidine, docusate sodium, acetaminophen, ammonium lactate, bisacodyl, polyethylene glycol    Scheduled:    potassium & sodium phosphates  1 packet Oral BID    heparin (porcine)  5,000 Units SubCUTAneous 3 times per day    sodium bicarbonate  1,300 mg Oral TID    hydrALAZINE  5 mg IntraVENous Once    atorvastatin  40 mg Oral Daily    calcium carbonate  2 tablet Oral Daily    vitamin D3  2,500 Units Oral Daily    [Held by provider] divalproex  250 mg Oral TID    dronabinol  5 mg Oral BID AC    [Held by provider] DULoxetine  120 mg Oral Daily    ferrous sulfate  325 mg Oral Daily with breakfast    melatonin  5 mg Oral Nightly    metoclopramide  10 mg Oral 4x Daily    therapeutic multivitamin-minerals  1 tablet Oral Daily    sertraline  25 mg Oral Daily    vitamin C  500 mg Oral BID    zinc sulfate  50 mg Oral Daily    cefTRIAXone (ROCEPHIN) IV  1,000 mg IntraVENous Q24H       Lab Results   Component Value Date     02/12/2022    K 3.7 02/12/2022    BUN 5 02/12/2022    CREATININE 0.4

## 2022-02-12 NOTE — OP NOTE
[address]                                OPERATIVE REPORT    PATIENT NAME: Stephei Hernandes                        :        1970  MED REC NO:   [id number]                            ROOM:       [id number]  ACCOUNT NO:   [de-identified]                           ADMIT DATE: 2022  PROVIDER:     Elizabeth Eugene DO    DATE OF PROCEDURE:  2022    PREOPERATIVE DIAGNOSIS:  Bilateral ureteral stones. POSTOPERATIVE DIAGNOSES:  Bilateral ureteral stones but also left-sided  pyelonephrosis. PROCEDURES PERFORMED:  1. Cystoscopy. 2.  Bilateral retrograde pyelogram was done under fluoroscopy. 3.  Bilateral stent insertion. 4.  Feliz catheter replacement. SURGEON:  Benedicto Eugene DO    ANESTHESIA:  Monitored anesthesia. ESTIMATED BLOOD LOSS:  None. CONDITION TO PACU:  Stable. MEDICATIONS:  Preoperative antibiotics were administered. PATHOLOGIC SPECIMEN:  None. HISTORY:  This is a 79-year-old  female who about one year ago  had a stroke. She recently presented and had some change in mental  status. Imaging was done, which did show she has a fairly large left  renal stone but also a left proximal ureteral stone and then she also  had a right ureteral stone. She has bilateral hydronephrosis. Options  were discussed with her preoperatively. Although she has stroke, she  was able to understand everything that I was able to communicate to her. We did obtain verbal consent from her family and she elected to proceed. OPERATIVE PROCEDURE:  This is a 79-year-old  female who was  brought to the operating room #1 at [address] [place] [address], placed  in the supine position, and induced with monitored anesthesia. Anesthesia monitored the head and neck area, IV access, and vital signs  throughout the course of the case.   Status post induction, the patient  was placed in dorsal lithotomy position. All endpoints were pressure  padded. SCDs were applied. She was prepped and draped in normal  sterile fashion. I proceeded by taking a 21-Lao Olympus scope with a  30-degree lens and inserted through the meatus in atraumatic fashion. Panendoscopic visualization of the bladder was devoid of any significant  masses, tumors, lesions, or defects. She did have reduced bladder  capacity. I did cannulate the left ureteral orifice. I shot a  retrograde pyelogram under fluoroscopy. In the proximal ureter, I could  see a fairly large stone and, in the left kidney, there was also a large  stone, probably about at least a centimeter in the ureter and may be  even bigger in the kidney, probably 2 cm. So, at this point, I did  place a 0.035 Glidewire up. I was able to get a 6 x 26 double-J stent  in. I did get a large amount of suppurative material to drain from the  kidney consistent with pyelonephrosis. I then shot a right retrograde  pyelogram.  In the proximal right ureter, there was also a stone. I was  able to get a 0.035 Glidewire past this and I placed a second stent  being a 6 x 26 double-J. Feliz catheter was placed. She awoke from  anesthesia without complication and was brought to PACU in stable  condition. She does not have any family present. PLAN:  1. I will continue the stents. 2.  Continue the Feliz catheter. 3.  Continue the IV fluids. 4.  Continue the antibiotics. 5.  She will need a laser lithotripsy bilaterally in about two to four  weeks.   6.  We will continue to follow her closely while she in the hospital.        [facility], DO    D: 02/11/2022 11:27:24       T: 02/11/2022 15:48:52     [email or web address]  Job#: [id number]     Doc#: [id number]    CC:

## 2022-02-12 NOTE — PROGRESS NOTES
Nephrology Note  Penny Austin MD          Patient seen and examined. No family member is present at bedside. Chart reviewed. Patient feeling much better. Oral intake has improved. Denies shortness of breath. Appetite good. No nausea or dysguesia. Awake and alert . In no acute distress. Vital SignsBP (!) 132/96   Pulse 85   Temp 98.2 °F (36.8 °C) (Oral)   Resp 18   Ht 5' 2\" (1.575 m)   Wt 127 lb 8 oz (57.8 kg)   SpO2 98%   BMI 23.32 kg/m²   24HR INTAKE/OUTPUT:    Intake/Output Summary (Last 24 hours) at 2/12/2022 1500  Last data filed at 2/12/2022 1458  Gross per 24 hour   Intake 1116.25 ml   Output 2500 ml   Net -1383.75 ml         Physical Exam    Neck: No JVD  Lungs: Breath sounds decreased at the bases. No rales or ronchi. Heart: Regular rate and rhythm. No S3 gallop. No  murmrur. Abdomen: Soft non distended, non tender and normal bowel sounds.   Extremeties: Trace bipedal edema which is slightly more pronounced on the left           Current Facility-Administered Medications   Medication Dose Route Frequency Provider Last Rate Last Admin    meperidine (DEMEROL) injection 12.5 mg  12.5 mg IntraVENous Q5 Min PRN Osmani Boswell DO   12.5 mg at 02/11/22 1130    dextrose 5 % solution  50 mL/hr IntraVENous Continuous Falguni Arredondo MD 50 mL/hr at 02/11/22 1751 50 mL/hr at 02/11/22 1751    potassium & sodium phosphates (PHOS-NAK) 280-160-250 MG packet 250 mg  1 packet Oral BID Falguni Arredondo MD   250 mg at 02/12/22 1039    heparin (porcine) injection 5,000 Units  5,000 Units SubCUTAneous 3 times per day Adriano Ernandez MD   5,000 Units at 02/12/22 1341    sodium bicarbonate tablet 1,300 mg  1,300 mg Oral TID Falguni Arredondo MD   1,300 mg at 02/12/22 1341    hydrALAZINE (APRESOLINE) injection 5 mg  5 mg IntraVENous Once Jazzy Dobbins DO        docusate sodium (COLACE) capsule 100 mg  100 mg Oral BID PRN Adriano Ernandez MD   100 mg at 02/12/22 1036    acetaminophen (TYLENOL) tablet 650 mg  650 mg Oral Q6H PRN Lucina Lux MD        ammonium lactate (LAC-HYDRIN) 12 % lotion   Topical PRN Lucina Lux MD        atorvastatin (LIPITOR) tablet 40 mg  40 mg Oral Daily Bahaa A Awadalla, MD   40 mg at 02/12/22 1036    bisacodyl (DULCOLAX) suppository 10 mg  10 mg Rectal Daily PRN Lucina Lux MD        calcium carbonate (TUMS) chewable tablet 1,000 mg  2 tablet Oral Daily Lucina Lux MD   1,000 mg at 02/12/22 1036    vitamin D3 (CHOLECALCIFEROL) tablet 2,500 Units  2,500 Units Oral Daily Lucina Lux MD   2,500 Units at 02/12/22 1019    [Held by provider] divalproex (DEPAKOTE) DR tablet 250 mg  250 mg Oral TID Lucina Lux MD        dronabinol (MARINOL) capsule 5 mg  5 mg Oral BID AC Bahaa A Awadalla, MD   5 mg at 02/12/22 0546    [Held by provider] DULoxetine (CYMBALTA) extended release capsule 120 mg  120 mg Oral Daily Lucina Lux MD        ferrous sulfate (IRON 325) tablet 325 mg  325 mg Oral Daily with breakfast Lucina Lux MD   325 mg at 02/12/22 1103    melatonin disintegrating tablet 5 mg  5 mg Oral Nightly Lucina Lux MD   5 mg at 02/11/22 2112    metoclopramide (REGLAN) tablet 10 mg  10 mg Oral 4x Daily Lucina Lux MD   10 mg at 02/12/22 1258    therapeutic multivitamin-minerals 1 tablet  1 tablet Oral Daily Lucina Lux MD   1 tablet at 02/12/22 1036    polyethylene glycol (GLYCOLAX) packet 17 g  17 g Oral Daily PRN Lucina Lux MD        sertraline (ZOLOFT) tablet 25 mg  25 mg Oral Daily Lucina Lux MD   25 mg at 02/12/22 1040    ascorbic acid (VITAMIN C) tablet 500 mg  500 mg Oral BID Lucina Lux MD   500 mg at 02/12/22 1019    zinc sulfate (ZINCATE) capsule 50 mg  50 mg Oral Daily Bahaa A Awadalla, MD   50 mg at 02/12/22 1019    cefTRIAXone (ROCEPHIN) 1,000 mg in sterile water 10 mL IV syringe  1,000 mg IntraVENous Q24H Lucina Lux MD   1,000 mg at 02/11/22 2108       US DUP LOWER EXTREMITIES BILATERAL VENOUS   Final Result   No evidence of DVT in either lower extremity. RECOMMENDATIONS:   Unavailable         XR UROGRAM W OR WO KUB W OR WO TOMOGRAM   Final Result   Placement of a bilateral ureter catheters. CT ABDOMEN PELVIS WO CONTRAST Additional Contrast? None   Final Result   Bilateral obstructing nephrolithiasis with right UPJ and left mid ureteral   stones present with upstream dilatation and background nephrolithiasis   bilaterally in the kidneys. RECOMMENDATIONS:   Unavailable         US RETROPERITONEAL COMPLETE   Final Result   Mild right and minimal left hydronephrosis. Bilateral ureteral jets visualized within the bladder. I cannot confirm   obstructive uropathy. CT HEAD WO CONTRAST   Final Result   1. There is no acute intracranial hemorrhage   2. Significant encephalomalacia is seen within the right cerebral hemisphere   likely secondary to an old infarct/hemorrhagic  infarct. 3. Previous large right craniotomy defect. XR CHEST PORTABLE   Final Result   No active cardiopulmonary disease. Labs:    CBC:   Recent Labs     02/10/22  0616 02/11/22  0745 02/12/22  0635   WBC 5.7 6.7 7.6   HGB 12.7 11.5 11.7    227 160        No results found for: IRON, TIBC, FERRITIN    Lab Results   Component Value Date    CALCIUM 8.3 (L) 02/12/2022    CALCIUM 8.7 02/11/2022    CALCIUM 8.4 (L) 02/11/2022    PHOS 2.6 02/12/2022    PHOS 2.3 (L) 02/11/2022    MG 1.6 02/12/2022    MG 1.6 02/11/2022    MG 1.8 02/09/2022       BMP:   Recent Labs     02/11/22  0745 02/11/22  1542 02/12/22  0635   * 146 142   K 3.9 3.9 3.7   * 116* 109*   CO2 19* 16* 19*   BUN 12 8 5*   CREATININE 0.6 0.5 0.4*   GLUCOSE 77 118* 95             Assessment: / Plan:    1. Acute kidney injury.   Acute kidney injury secondary to renal hypoperfusion with decreased oral intake as well as concurrent use of angiotensin II central blocking agent and nonsteroidal twenty drugs. Urinary obstruction obviously played a role in the patient's acute kidney injury. Patient is status post bilateral ureteral stent placement yesterday. Renal function continues to improve. will Hep-Lock IV fluids. 2.  Hypernatremia. Hypernatremia secondary to poor oral intake. Improved. 3.  Metabolic acidosis with slightly increased anion gap. This may be reflection of acute kidney injury as well as possible tubular defect with urinary obstruction. Improved. Continue bicarbonate supplement to target a bicarbonate level of 22 mmol/L       4. Hypomagnesemia. .  Magnesium level is borderline. Will follow and supplement as needed. 5.  Hypophosphatemia. .  This reflects poor oral intake. Improved. Supplement as needed. PLAN:          Brittany Jade MD  Nephrology  Electronically signed by Brittany Jade MD on 2/12/2022 at 2:59 PM      Note: This report was completed utilizing computer voice recognition software. Every effort has been made to ensure accuracy, however; inadvertent computerized transcription errors may be present.

## 2022-02-12 NOTE — PROGRESS NOTES
Department of Internal Medicine  General Internal Medicine  Attending Progress Note      SUBJECTIVE:    Seen and examined this morning  Awake and alert   No complaints  No issues overnight  Eating better this morning     Medications    Current Facility-Administered Medications: meperidine (DEMEROL) injection 12.5 mg, 12.5 mg, IntraVENous, Q5 Min PRN  dextrose 5 % solution, 50 mL/hr, IntraVENous, Continuous  potassium & sodium phosphates (PHOS-NAK) 280-160-250 MG packet 250 mg, 1 packet, Oral, BID  heparin (porcine) injection 5,000 Units, 5,000 Units, SubCUTAneous, 3 times per day  sodium bicarbonate tablet 1,300 mg, 1,300 mg, Oral, TID  hydrALAZINE (APRESOLINE) injection 5 mg, 5 mg, IntraVENous, Once  docusate sodium (COLACE) capsule 100 mg, 100 mg, Oral, BID PRN  acetaminophen (TYLENOL) tablet 650 mg, 650 mg, Oral, Q6H PRN  ammonium lactate (LAC-HYDRIN) 12 % lotion, , Topical, PRN  atorvastatin (LIPITOR) tablet 40 mg, 40 mg, Oral, Daily  bisacodyl (DULCOLAX) suppository 10 mg, 10 mg, Rectal, Daily PRN  calcium carbonate (TUMS) chewable tablet 1,000 mg, 2 tablet, Oral, Daily  vitamin D3 (CHOLECALCIFEROL) tablet 2,500 Units, 2,500 Units, Oral, Daily  [Held by provider] divalproex (DEPAKOTE) DR tablet 250 mg, 250 mg, Oral, TID  dronabinol (MARINOL) capsule 5 mg, 5 mg, Oral, BID AC  [Held by provider] DULoxetine (CYMBALTA) extended release capsule 120 mg, 120 mg, Oral, Daily  ferrous sulfate (IRON 325) tablet 325 mg, 325 mg, Oral, Daily with breakfast  melatonin disintegrating tablet 5 mg, 5 mg, Oral, Nightly  metoclopramide (REGLAN) tablet 10 mg, 10 mg, Oral, 4x Daily  therapeutic multivitamin-minerals 1 tablet, 1 tablet, Oral, Daily  polyethylene glycol (GLYCOLAX) packet 17 g, 17 g, Oral, Daily PRN  sertraline (ZOLOFT) tablet 25 mg, 25 mg, Oral, Daily  ascorbic acid (VITAMIN C) tablet 500 mg, 500 mg, Oral, BID  zinc sulfate (ZINCATE) capsule 50 mg, 50 mg, Oral, Daily  cefTRIAXone (ROCEPHIN) 1,000 mg in sterile water 10 mL IV syringe, 1,000 mg, IntraVENous, Q24H    Physical    VITALS:  BP (!) 132/96   Pulse 85   Temp 98.2 °F (36.8 °C) (Oral)   Resp 18   Ht 5' 2\" (1.575 m)   Wt 127 lb 8 oz (57.8 kg)   SpO2 98%   BMI 23.32 kg/m²   TEMPERATURE:  Current - Temp: 98.2 °F (36.8 °C); Max - Temp  Av.1 °F (36.7 °C)  Min: 98 °F (36.7 °C)  Max: 98.2 °F (36.8 °C)  RESPIRATIONS RANGE: Resp  Av.5  Min: 13  Max: 23  PULSE RANGE: Pulse  Av  Min: 65  Max: 87  BLOOD PRESSURE RANGE:  Systolic (79WDU), PFR:299 , Min:132 , VUM:075   ; Diastolic (87JHF), NFF:98, Min:92, Max:96    PULSE OXIMETRY RANGE: SpO2  Av.8 %  Min: 96 %  Max: 100 %  24HR INTAKE/OUTPUT:      Intake/Output Summary (Last 24 hours) at 2022 1220  Last data filed at 2022 1016  Gross per 24 hour   Intake 1416.25 ml   Output 2050 ml   Net -633.75 ml       CONSTITUTIONAL: Awake, no distress, appears as stated age. HEENT: Normocephalic atraumatic. Pupils are equal and reactive bilaterally. Extraocular movements are intact. No pallor or icterus appreciated. NECK: Supple, no JVD , no lymphadenopathy, no bruits, no thyromegaly  LUNGS: Clear to auscultation bilaterally. CARDIOVASCULAR: Regular rate and rhythm, no murmur rub or gallop. ABDOMEN: Soft, nontender, bowel sounds are positive, no organomegaly appreciated. NEUROLOGIC: Awake, alert, oriented x 3 ,left side hemiplegia, receptive aphasia  EXT: No clubbing, or cyanosis.   Trace to +1 edema left leg  No edema rt leg    CBC with Differential:    Lab Results   Component Value Date    WBC 7.6 2022    RBC 3.63 2022    HGB 11.7 2022    HCT 34.3 2022     2022    MCV 94.5 2022    MCH 32.2 2022    MCHC 34.1 2022    RDW 12.0 2022    SEGSPCT 56 2011    LYMPHOPCT 29.0 2022    MONOPCT 8.4 2022    BASOPCT 0.5 2022    MONOSABS 0.64 2022    LYMPHSABS 2.20 2022    EOSABS 0.15 2022    BASOSABS 0.04 2022 CMP:    Lab Results   Component Value Date     02/12/2022    K 3.7 02/12/2022     02/12/2022    CO2 19 02/12/2022    BUN 5 02/12/2022    CREATININE 0.4 02/12/2022    GFRAA >60 02/12/2022    LABGLOM >60 02/12/2022    GLUCOSE 95 02/12/2022    GLUCOSE 100 05/27/2011    PROT 5.2 02/12/2022    LABALBU 2.6 02/12/2022    LABALBU 4.6 05/27/2011    CALCIUM 8.3 02/12/2022    BILITOT 0.2 02/12/2022    ALKPHOS 88 02/12/2022    AST 17 02/12/2022    ALT 11 02/12/2022         ASSESSMENT AND PLAN      1. Acute renal failure,  pre renal due to poor po intake and post renal due to obstructive uropathy with CT scan abdomen revealing ureteric stones with hydronephrosis  Creatinine down to 0.4 with iv hydration  Nephrology following and replacing electrolytes. 2.Obstructive uropathy due to B/L ureteric stones, s/p  cystoscopy 2/11th with b/l stent placements and pollack catheter insertion. Urology following and planning Lithotripsy in few weeks. 3.UTI, Rocephin iv,urine culture negative so far , ? DC Rocephin if ok with Urology  4. Altered mental status secondary to above, resolved. 5.Loss of appetite ? Secondary to above,Dietary following. 6.Hx of remote CVA with Lt hemiplegia  7. Hx of depression, on Zoloft 25 mg daily. 8.Hyperlipidemia lipitor 40 mg daily  9. Edema lt leg most likely due to disuse, awaiting results of  US legs to r/o DVT.   Case discussed in detail with her Uncle Mr Naty Davis over the phone today     Manuelito Tariq MD, MD.  12:20 PM  2/12/2022

## 2022-02-14 LAB — BLOOD CULTURE, ROUTINE: NORMAL

## 2022-02-14 NOTE — DISCHARGE SUMMARY
[address]                               DISCHARGE SUMMARY    PATIENT NAME: Tip Irvin                        :        1970  MED REC NO:   [id number]                            ROOM:       [id number]  ACCOUNT NO:   [de-identified]                           ADMIT DATE: 2022  PROVIDER:     Madelyn Sanford MD                  DISCHARGE DATE:  2022    DISCHARGE DIAGNOSES:  1. Acute renal failure. 2.  Obstructive uropathy. 3.  Altered mental status secondary to above. 4.  Loss of appetite. 5.  History of remote CVA. 6.  Depression. 7.  Hyperlipidemia. HOSPITAL COURSE:  The patient is a 54-year-old lady. The patient was  sent in from [facility] because of not eating and drinking  well and altered mental status. The patient has been placed on IV  fluids in the nursing home for creatinine being around 1.7 a few days  earlier. She was evaluated in the ER and her initial evaluation  revealed creatinine up to 3.5. The patient was started on IV fluids. Her urinalysis showed evidence of possible UTI and she was started on  Rocephin intravenously. The patient was admitted for further evaluation  and management. She had an ultrasound of the kidneys, which revealed  evidence of mild hydronephrosis bilaterally. Urology consultation was  obtained and the patient had a CT scan of the abdomen revealing evidence  of ureteric stones with hydronephrosis. Urology performed cystoscopy on   with bilateral stent placement. Her creatinine came down  eventually to 0.5 with IV hydration and after placement of the stents. Her urine cultures came back negative. Rocephin was discontinued after  checking with Urology. The patient had a Feliz catheter after the  procedure, but this was discontinued and discharged per Urology.   The  plan is for lithotripsy in about three to four weeks as an outpatient. During this admission, the patient was kept only on Zoloft 25 mg daily  and Cymbalta and Depakote and Remeron were discontinued. Her altered  mental status resolved when she was back to her baseline. She was for  the most part awake and alert and oriented x3. Case was discussed in  detail with her uncle over the phone. She had an ultrasound done of  lower extremities, which came back negative for DVT. She had some  swelling mostly on the left side, they are most likely secondary to  disuse. She does have a history of CVA with left-sided hemiplegia. The  patient was doing well and was accepted to nursing home and she was  discharged to a nursing home in clinically stable condition. DISCHARGE MEDICATIONS:  Colace 100 mg b.i.d. as needed, vitamin D3 2500  units daily, Tylenol 650 every six hours as needed, vitamin C 500 mg  twice daily, Tums two tablets daily, Dulcolax 10 mg rectally daily as  needed, ferrous sulfate 325 mg daily, Marinol 5 mg p.o. b.i.d.,  melatonin 5 mg nightly, multivitamin one tablet daily, Zoloft 25 mg  daily, zinc sulfate 50 mg daily, Reglan 10 mg four times daily, Lipitor  40 mg daily. Discussed with her uncle regarding poor p.o. intake and hopefully she  starts eating and drinking better after resolution of the above issues. Discussed with her also placement of PEG tube, which she did not want at  this point and I would agree that we need to watch for the next few  weeks and see how she does with p.o. intake prior to considering PEG  tube placement. The patient is discharged to nursing home in clinically  stable condition.         Brandon Escoto MD    D: 02/13/2022 16:24:35       T: 02/14/2022 1:08:07     [email or web address]  Job#: [id number]     Doc#: [id number]    CC:

## 2022-03-01 ENCOUNTER — PREP FOR PROCEDURE (OUTPATIENT)
Dept: UROLOGY | Age: 52
End: 2022-03-01

## 2022-03-01 RX ORDER — SODIUM CHLORIDE 0.9 % (FLUSH) 0.9 %
5-40 SYRINGE (ML) INJECTION EVERY 12 HOURS SCHEDULED
Status: CANCELLED | OUTPATIENT
Start: 2022-03-01

## 2022-03-01 RX ORDER — SODIUM CHLORIDE 0.9 % (FLUSH) 0.9 %
5-40 SYRINGE (ML) INJECTION PRN
Status: CANCELLED | OUTPATIENT
Start: 2022-03-01

## 2022-03-01 RX ORDER — SODIUM CHLORIDE 9 MG/ML
INJECTION, SOLUTION INTRAVENOUS CONTINUOUS
Status: CANCELLED | OUTPATIENT
Start: 2022-03-01

## 2022-03-01 RX ORDER — SODIUM CHLORIDE 9 MG/ML
25 INJECTION, SOLUTION INTRAVENOUS PRN
Status: CANCELLED | OUTPATIENT
Start: 2022-03-01

## 2022-03-07 NOTE — PROGRESS NOTES
Pre-Op Instructions faxed to [facility].  Spoke with Marleny Jack and verified arrival time of Tamia Castano RN

## 2022-03-07 NOTE — PROGRESS NOTES
[address]   PRE-ADMISSION TESTING GENERAL INSTRUCTIONS  [facility] Phone Number: [phone]      GENERAL INSTRUCTIONS:    [x] Antibacterial Soap Shower Night before and/or AM of Surgery  [x] Do not wear makeup, lotions, powders, deodorant. [x] Nothing by mouth after midnight, including gum, candy, mints, or water. [x] You may brush your teeth, gargle, but do NOT swallow water. [x] No tobacco products, illegal drugs, or alcohol within 24 hours of your surgery. [x] Jewelry or valuables should not be brought to the hospital. All body and/or tongue piercing's must be removed prior to arriving to hospital. ALL hair pins must be removed. [x] Arrange transportation with a responsible adult  to and from the hospital. Arrange for someone to be with you for the remainder of the day and for 24 hours after your procedure due to having had anesthesia. Who will be your  for transportation? Janay and Tiffany         Who will be staying with you for 24 hrs after your procedure? [facility]   [x] Bring insurance card and photo ID. PARKING INSTRUCTIONS:     [x] ARRIVAL TIME: *3/8/22 @ 6:30AM*   · [x] Enter into the [facility]. Two people may accompany you. Masks are required. · [x] Parking Lot \"I\" is where you will park. It is located on the corner of [facility] and [facility]. The entrance is on [facility]. · To enter, press the button and the gate will lift. A free token will be provided to exit the lot. MEDICATION INSTRUCTIONS:    [x] Bring a complete list of your medications, please write the last time you took the medicine, give this list to the nurse. [x] Take the following medications the morning of surgery with 1-2 ounces of water: ZOLOFT and TYLENOL (IF NEEDED)  [x] Stop herbal supplements and vitamins 5 days before your surgery.  (Do not take anymore until after procedure)    WHAT TO EXPECT:    [x] The day of

## 2022-03-08 ENCOUNTER — ANESTHESIA (OUTPATIENT)
Dept: OPERATING ROOM | Age: 52
End: 2022-03-08
Payer: COMMERCIAL

## 2022-03-08 ENCOUNTER — HOSPITAL ENCOUNTER (OUTPATIENT)
Age: 52
Setting detail: OUTPATIENT SURGERY
Discharge: OTHER INSTITUTION | End: 2022-03-08
Attending: UROLOGY | Admitting: UROLOGY
Payer: COMMERCIAL

## 2022-03-08 ENCOUNTER — ANESTHESIA EVENT (OUTPATIENT)
Dept: OPERATING ROOM | Age: 52
End: 2022-03-08
Payer: COMMERCIAL

## 2022-03-08 ENCOUNTER — APPOINTMENT (OUTPATIENT)
Dept: GENERAL RADIOLOGY | Age: 52
End: 2022-03-08
Attending: UROLOGY
Payer: COMMERCIAL

## 2022-03-08 VITALS — TEMPERATURE: 97.3 F | SYSTOLIC BLOOD PRESSURE: 121 MMHG | DIASTOLIC BLOOD PRESSURE: 83 MMHG | OXYGEN SATURATION: 95 %

## 2022-03-08 VITALS
WEIGHT: 130 LBS | RESPIRATION RATE: 16 BRPM | HEIGHT: 62 IN | SYSTOLIC BLOOD PRESSURE: 125 MMHG | HEART RATE: 88 BPM | DIASTOLIC BLOOD PRESSURE: 91 MMHG | BODY MASS INDEX: 23.92 KG/M2 | TEMPERATURE: 97 F | OXYGEN SATURATION: 96 %

## 2022-03-08 PROCEDURE — 87088 URINE BACTERIA CULTURE: CPT

## 2022-03-08 PROCEDURE — 3600000004 HC SURGERY LEVEL 4 BASE: Performed by: UROLOGY

## 2022-03-08 PROCEDURE — 7100000011 HC PHASE II RECOVERY - ADDTL 15 MIN: Performed by: UROLOGY

## 2022-03-08 PROCEDURE — 3600000014 HC SURGERY LEVEL 4 ADDTL 15MIN: Performed by: UROLOGY

## 2022-03-08 PROCEDURE — 7100000001 HC PACU RECOVERY - ADDTL 15 MIN: Performed by: UROLOGY

## 2022-03-08 PROCEDURE — 3700000000 HC ANESTHESIA ATTENDED CARE: Performed by: UROLOGY

## 2022-03-08 PROCEDURE — 6370000000 HC RX 637 (ALT 250 FOR IP): Performed by: UROLOGY

## 2022-03-08 PROCEDURE — 2580000003 HC RX 258: Performed by: NURSE PRACTITIONER

## 2022-03-08 PROCEDURE — 7100000010 HC PHASE II RECOVERY - FIRST 15 MIN: Performed by: UROLOGY

## 2022-03-08 PROCEDURE — 6360000002 HC RX W HCPCS: Performed by: NURSE PRACTITIONER

## 2022-03-08 PROCEDURE — 88300 SURGICAL PATH GROSS: CPT

## 2022-03-08 PROCEDURE — 2709999900 HC NON-CHARGEABLE SUPPLY: Performed by: UROLOGY

## 2022-03-08 PROCEDURE — 3209999900 FLUORO FOR SURGICAL PROCEDURES

## 2022-03-08 PROCEDURE — 7100000000 HC PACU RECOVERY - FIRST 15 MIN: Performed by: UROLOGY

## 2022-03-08 PROCEDURE — 6360000002 HC RX W HCPCS: Performed by: NURSE ANESTHETIST, CERTIFIED REGISTERED

## 2022-03-08 PROCEDURE — 3700000001 HC ADD 15 MINUTES (ANESTHESIA): Performed by: UROLOGY

## 2022-03-08 PROCEDURE — 82365 CALCULUS SPECTROSCOPY: CPT

## 2022-03-08 PROCEDURE — 2500000003 HC RX 250 WO HCPCS: Performed by: NURSE ANESTHETIST, CERTIFIED REGISTERED

## 2022-03-08 PROCEDURE — C1769 GUIDE WIRE: HCPCS | Performed by: UROLOGY

## 2022-03-08 PROCEDURE — 2720000010 HC SURG SUPPLY STERILE: Performed by: UROLOGY

## 2022-03-08 RX ORDER — ONDANSETRON 2 MG/ML
INJECTION INTRAMUSCULAR; INTRAVENOUS PRN
Status: DISCONTINUED | OUTPATIENT
Start: 2022-03-08 | End: 2022-03-08 | Stop reason: SDUPTHER

## 2022-03-08 RX ORDER — SODIUM CHLORIDE 0.9 % (FLUSH) 0.9 %
5-40 SYRINGE (ML) INJECTION EVERY 12 HOURS SCHEDULED
Status: DISCONTINUED | OUTPATIENT
Start: 2022-03-08 | End: 2022-03-08 | Stop reason: HOSPADM

## 2022-03-08 RX ORDER — IBUPROFEN 400 MG/1
600 TABLET ORAL EVERY 8 HOURS PRN
Status: DISCONTINUED | OUTPATIENT
Start: 2022-03-08 | End: 2022-03-08 | Stop reason: HOSPADM

## 2022-03-08 RX ORDER — KETAMINE HCL IN NACL, ISO-OSM 100MG/10ML
SYRINGE (ML) INJECTION PRN
Status: DISCONTINUED | OUTPATIENT
Start: 2022-03-08 | End: 2022-03-08 | Stop reason: SDUPTHER

## 2022-03-08 RX ORDER — ONDANSETRON 2 MG/ML
4 INJECTION INTRAMUSCULAR; INTRAVENOUS EVERY 6 HOURS PRN
Status: DISCONTINUED | OUTPATIENT
Start: 2022-03-08 | End: 2022-03-08 | Stop reason: HOSPADM

## 2022-03-08 RX ORDER — PROPOFOL 10 MG/ML
INJECTION, EMULSION INTRAVENOUS CONTINUOUS PRN
Status: DISCONTINUED | OUTPATIENT
Start: 2022-03-08 | End: 2022-03-08 | Stop reason: SDUPTHER

## 2022-03-08 RX ORDER — SODIUM CHLORIDE 9 MG/ML
INJECTION, SOLUTION INTRAVENOUS CONTINUOUS
Status: DISCONTINUED | OUTPATIENT
Start: 2022-03-08 | End: 2022-03-08 | Stop reason: HOSPADM

## 2022-03-08 RX ORDER — PHENAZOPYRIDINE HYDROCHLORIDE 100 MG/1
100 TABLET, FILM COATED ORAL 3 TIMES DAILY PRN
Qty: 30 TABLET | Refills: 1 | Status: SHIPPED | OUTPATIENT
Start: 2022-03-08 | End: 2022-03-28

## 2022-03-08 RX ORDER — GLYCOPYRROLATE 1 MG/5 ML
SYRINGE (ML) INTRAVENOUS PRN
Status: DISCONTINUED | OUTPATIENT
Start: 2022-03-08 | End: 2022-03-08 | Stop reason: SDUPTHER

## 2022-03-08 RX ORDER — PHENAZOPYRIDINE HYDROCHLORIDE 100 MG/1
100 TABLET, FILM COATED ORAL 3 TIMES DAILY PRN
Status: DISCONTINUED | OUTPATIENT
Start: 2022-03-08 | End: 2022-03-08 | Stop reason: HOSPADM

## 2022-03-08 RX ORDER — SODIUM CHLORIDE 0.9 % (FLUSH) 0.9 %
5-40 SYRINGE (ML) INJECTION PRN
Status: DISCONTINUED | OUTPATIENT
Start: 2022-03-08 | End: 2022-03-08 | Stop reason: HOSPADM

## 2022-03-08 RX ORDER — SODIUM CHLORIDE 9 MG/ML
25 INJECTION, SOLUTION INTRAVENOUS PRN
Status: DISCONTINUED | OUTPATIENT
Start: 2022-03-08 | End: 2022-03-08 | Stop reason: HOSPADM

## 2022-03-08 RX ORDER — LIDOCAINE HYDROCHLORIDE 20 MG/ML
INJECTION, SOLUTION INTRAVENOUS PRN
Status: DISCONTINUED | OUTPATIENT
Start: 2022-03-08 | End: 2022-03-08 | Stop reason: SDUPTHER

## 2022-03-08 RX ORDER — CEPHALEXIN 250 MG/1
250 CAPSULE ORAL 3 TIMES DAILY
Qty: 9 CAPSULE | Refills: 0 | Status: SHIPPED | OUTPATIENT
Start: 2022-03-08 | End: 2022-04-06

## 2022-03-08 RX ORDER — MIDAZOLAM HYDROCHLORIDE 1 MG/ML
INJECTION INTRAMUSCULAR; INTRAVENOUS PRN
Status: DISCONTINUED | OUTPATIENT
Start: 2022-03-08 | End: 2022-03-08 | Stop reason: SDUPTHER

## 2022-03-08 RX ORDER — FENTANYL CITRATE 50 UG/ML
25 INJECTION, SOLUTION INTRAMUSCULAR; INTRAVENOUS EVERY 5 MIN PRN
Status: DISCONTINUED | OUTPATIENT
Start: 2022-03-08 | End: 2022-03-08 | Stop reason: HOSPADM

## 2022-03-08 RX ORDER — DEXAMETHASONE SODIUM PHOSPHATE 10 MG/ML
INJECTION INTRAMUSCULAR; INTRAVENOUS PRN
Status: DISCONTINUED | OUTPATIENT
Start: 2022-03-08 | End: 2022-03-08 | Stop reason: SDUPTHER

## 2022-03-08 RX ORDER — FENTANYL CITRATE 50 UG/ML
INJECTION, SOLUTION INTRAMUSCULAR; INTRAVENOUS PRN
Status: DISCONTINUED | OUTPATIENT
Start: 2022-03-08 | End: 2022-03-08 | Stop reason: SDUPTHER

## 2022-03-08 RX ORDER — ONDANSETRON 2 MG/ML
4 INJECTION INTRAMUSCULAR; INTRAVENOUS
Status: DISCONTINUED | OUTPATIENT
Start: 2022-03-08 | End: 2022-03-08 | Stop reason: HOSPADM

## 2022-03-08 RX ORDER — IBUPROFEN 600 MG/1
600 TABLET ORAL EVERY 8 HOURS PRN
Qty: 20 TABLET | Refills: 1 | Status: SHIPPED | OUTPATIENT
Start: 2022-03-08

## 2022-03-08 RX ORDER — CEPHALEXIN 250 MG/1
250 CAPSULE ORAL 3 TIMES DAILY
Qty: 9 CAPSULE | Refills: 0 | Status: SHIPPED | OUTPATIENT
Start: 2022-03-08 | End: 2022-04-06 | Stop reason: ALTCHOICE

## 2022-03-08 RX ORDER — PHENAZOPYRIDINE HYDROCHLORIDE 100 MG/1
100 TABLET, FILM COATED ORAL 3 TIMES DAILY PRN
Qty: 30 TABLET | Refills: 1 | Status: SHIPPED | OUTPATIENT
Start: 2022-03-08 | End: 2022-03-08

## 2022-03-08 RX ORDER — IBUPROFEN 600 MG/1
600 TABLET ORAL EVERY 8 HOURS PRN
Qty: 20 TABLET | Refills: 1 | Status: SHIPPED | OUTPATIENT
Start: 2022-03-08 | End: 2022-03-08

## 2022-03-08 RX ADMIN — Medication 20 MG: at 09:07

## 2022-03-08 RX ADMIN — Medication 10 MG: at 10:29

## 2022-03-08 RX ADMIN — SODIUM CHLORIDE: 9 INJECTION, SOLUTION INTRAVENOUS at 08:32

## 2022-03-08 RX ADMIN — Medication 2000 MG: at 09:00

## 2022-03-08 RX ADMIN — PROPOFOL 100 MCG/KG/MIN: 10 INJECTION, EMULSION INTRAVENOUS at 09:03

## 2022-03-08 RX ADMIN — Medication 10 MG: at 10:19

## 2022-03-08 RX ADMIN — Medication 10 MG: at 10:06

## 2022-03-08 RX ADMIN — FENTANYL CITRATE 50 MCG: 50 INJECTION, SOLUTION INTRAMUSCULAR; INTRAVENOUS at 09:43

## 2022-03-08 RX ADMIN — Medication 0.2 MG: at 09:03

## 2022-03-08 RX ADMIN — MIDAZOLAM 2 MG: 1 INJECTION INTRAMUSCULAR; INTRAVENOUS at 09:03

## 2022-03-08 RX ADMIN — MIDAZOLAM 1 MG: 1 INJECTION INTRAMUSCULAR; INTRAVENOUS at 10:06

## 2022-03-08 RX ADMIN — LIDOCAINE HYDROCHLORIDE 60 MG: 20 INJECTION, SOLUTION INTRAVENOUS at 09:03

## 2022-03-08 RX ADMIN — IBUPROFEN 600 MG: 400 TABLET, FILM COATED ORAL at 14:26

## 2022-03-08 RX ADMIN — FENTANYL CITRATE 50 MCG: 50 INJECTION, SOLUTION INTRAMUSCULAR; INTRAVENOUS at 09:23

## 2022-03-08 RX ADMIN — DEXAMETHASONE SODIUM PHOSPHATE 10 MG: 10 INJECTION INTRAMUSCULAR; INTRAVENOUS at 09:03

## 2022-03-08 RX ADMIN — ONDANSETRON 4 MG: 2 INJECTION INTRAMUSCULAR; INTRAVENOUS at 09:03

## 2022-03-08 ASSESSMENT — PULMONARY FUNCTION TESTS
PIF_VALUE: 0

## 2022-03-08 ASSESSMENT — PAIN SCALES - GENERAL
PAINLEVEL_OUTOF10: 0
PAINLEVEL_OUTOF10: 5
PAINLEVEL_OUTOF10: 0
PAINLEVEL_OUTOF10: 0

## 2022-03-08 ASSESSMENT — LIFESTYLE VARIABLES: SMOKING_STATUS: 1

## 2022-03-08 ASSESSMENT — PAIN - FUNCTIONAL ASSESSMENT: PAIN_FUNCTIONAL_ASSESSMENT: 0-10

## 2022-03-08 NOTE — BRIEF OP NOTE
Brief Postoperative Note      Patient: Clemente Fisher  YOB: 1970  MRN: [id number]    Date of Procedure: 3/8/2022    Pre-Op Diagnosis: URETERAL STONES    Post-Op Diagnosis: Same       Procedure(s):  CYSTOSCOPY RETROGRADE PYELOGRAM URETEROSCOPY, LASER LITHOTRIPSY    Surgeon(s):  Bharathi Isabel MD    Assistant:  * No surgical staff found *    Anesthesia: Monitor Anesthesia Care    Estimated Blood Loss (mL): Minimal    Complications: None    Specimens:   ID Type Source Tests Collected by Time Destination   1 : URINE FOR CULTURE Urine Urine, Cystoscopic CULTURE, URINE Bharathi Isabel MD 3/8/2022 8803    A : RIGHT URETERAL STONE Stone (Calculus) [address] Bharathi Isabel MD 3/8/2022 1002        Implants:  * No implants in log *      Drains:   [REMOVED] Urethral Catheter Straight-tip (Removed)   Catheter Indications Need for fluid volume management of the critically ill patient in a critical care setting 02/09/22 1346       [REMOVED] Urethral Catheter Latex (Removed)   Urine Color Yellow 02/11/22 0830   Urine Appearance Hazy 02/11/22 0830   Output (mL) 300 mL 02/11/22 0349       [REMOVED] Urethral Catheter 16 fr (Removed)   Catheter Indications Need for fluid volume management of the critically ill patient in a critical care setting 02/12/22 1015   Urine Color Yellow;Pink 02/12/22 1015   Urine Appearance Hazy 02/12/22 1015   Output (mL) 550 mL 02/12/22 1458       Findings: Stones    Electronically signed by Bharathi Isabel MD on 3/8/2022 at 10:47 AM

## 2022-03-08 NOTE — PROGRESS NOTES
CLINICAL PHARMACY NOTE: MEDS TO BEDS    Total # of Prescriptions Filled: 2   The following medications were delivered to the patient:  · Cephalexin 250  · Ibuprofen 600    Additional Documentation:    [facility] needs resent to [phone] electronically per pharmacist there. Wont accept transfers.  rn notified

## 2022-03-08 NOTE — PROGRESS NOTES
Verified with nurse Alber Gongora @ [facility] that they will accept meds called in to our outpatient pharmacy, informed her that one script is being called in to [address] as it was not covered by pt insurance

## 2022-03-08 NOTE — ANESTHESIA PRE PROCEDURE
Department of Anesthesiology  Preprocedure Note       Name:  Tete Renteria   Age:  46 y.o.  :  1970                                          MRN:  [id number]         Date:  3/8/2022      Surgeon: Chey Lawrence):  Hood Villalba MD    Procedure: Procedure(s):  CYSTOSCOPY RETROGRADE PYELOGRAM URETEROSCOPY, J STENT, LASER LITHOTRIPSY    Medications prior to admission:   Prior to Admission medications    Medication Sig Start Date End Date Taking? Authorizing Provider   magnesium hydroxide (MILK OF MAGNESIA CONCENTRATE) 2400 MG/10ML SUSP Take 2,400 mg by mouth once as needed    Historical Provider, MD   docusate sodium (COLACE) 100 MG capsule Take 1 capsule by mouth 2 times daily as needed for Constipation 22   Oniel Hernandez MD   vitamin D3 (CHOLECALCIFEROL) 125 MCG (5000 UT) TABS tablet Take 0.5 tablets by mouth daily 22   Oniel Hernandez MD   acetaminophen (TYLENOL) 325 MG tablet Take 650 mg by mouth every 6 hours as needed for Pain    Historical Provider, MD   vitamin C (ASCORBIC ACID) 500 MG tablet Take 500 mg by mouth 2 times daily    Historical Provider, MD   calcium carbonate (TUMS) 500 MG chewable tablet Take 2 tablets by mouth daily    Historical Provider, MD   bisacodyl (DULCOLAX) 10 MG suppository Place 10 mg rectally daily as needed for Constipation    Historical Provider, MD   ferrous sulfate (IRON 325) 325 (65 Fe) MG tablet Take 325 mg by mouth daily (with breakfast)    Historical Provider, MD   ammonium lactate (LAC-HYDRIN) 12 % lotion Apply topically as needed for Dry Skin Apply topically as needed. Historical Provider, MD   dronabinol (MARINOL) 5 MG capsule Take 5 mg by mouth 2 times daily (before meals).     Historical Provider, MD   melatonin 5 MG TBDP disintegrating tablet Take 5 mg by mouth nightly    Historical Provider, MD   polyethylene glycol (GLYCOLAX) 17 g packet Take 17 g by mouth daily as needed for Constipation    Historical Provider, MD   Multiple Vitamins-Minerals (THERAPEUTIC MULTIVITAMIN-MINERALS) tablet Take 1 tablet by mouth daily    Historical Provider, MD   sertraline (ZOLOFT) 25 MG tablet Take 25 mg by mouth daily    Historical Provider, MD   zinc sulfate (ZINCATE) 220 (50 Zn) MG capsule Take 50 mg by mouth daily    Historical Provider, MD   metoclopramide (REGLAN) 10 MG tablet Take 1 tablet by mouth 4 times daily 3/29/19   IKE Arcos CNP   atorvastatin (LIPITOR) 40 MG tablet Take 40 mg by mouth daily    Historical Provider, MD       Current medications:    No current facility-administered medications for this visit. No current outpatient medications on file. Facility-Administered Medications Ordered in Other Visits   Medication Dose Route Frequency Provider Last Rate Last Admin    0.9 % sodium chloride infusion   IntraVENous Continuous IKE Rust CNP        0.9 % sodium chloride infusion  25 mL IntraVENous PRN IKE Rust CNP        ceFAZolin (ANCEF) 2000 mg in sterile water 20 mL IV syringe  2,000 mg IntraVENous On Call to [facility], APRN - CNP        sodium chloride flush 0.9 % injection 5-40 mL  5-40 mL IntraVENous 2 times per day IKE Rust - CNP        sodium chloride flush 0.9 % injection 5-40 mL  5-40 mL IntraVENous PRN IKE Rust CNP           Allergies:     Allergies   Allergen Reactions    Bactrim [Sulfamethoxazole-Trimethoprim] Hives       Problem List:    Patient Active Problem List   Diagnosis Code    Hammer toe, acquired M20.40    Altered mental status R41.82       Past Medical History:        Diagnosis Date    Cerebral artery occlusion with cerebral infarction [address] [address]     Hypertension     Panic attacks        Past Surgical History:        Procedure Laterality Date    BLADDER SURGERY Bilateral 2/11/2022    CYSTOSCOPY RETROGRADE PYELOGRAM BILATERAL  STENT INSERTION performed by Renetta Eugene DO at [facility]      FOOT SURGERY      right toes    FOOT SURGERY Left 3/27/13    correction 4th, 5th toes left foot    LAPAROTOMY      UPPER GASTROINTESTINAL ENDOSCOPY  05/20/2014       Social History:    Social History     Tobacco Use    Smoking status: Current Every Day Smoker     Packs/day: 1.00     Years: 20.00     Pack years: 20.00     Types: Cigarettes    Smokeless tobacco: Never Used   Substance Use Topics    Alcohol use: No                                Ready to quit: Not Answered  Counseling given: Not Answered      Vital Signs (Current): There were no vitals filed for this visit. BP Readings from Last 3 Encounters:   02/12/22 126/84   02/11/22 (!) 156/124   05/08/19 (!) 140/85       NPO Status:  GREATER THAN 8 HOURS                                                                               BMI:   Wt Readings from Last 3 Encounters:   03/07/22 130 lb 6.4 oz (59.1 kg)   02/09/22 127 lb 8 oz (57.8 kg)   05/08/19 160 lb (72.6 kg)     There is no height or weight on file to calculate BMI.    CBC:   Lab Results   Component Value Date    WBC 7.6 02/12/2022    RBC 3.63 02/12/2022    HGB 11.7 02/12/2022    HCT 34.3 02/12/2022    MCV 94.5 02/12/2022    RDW 12.0 02/12/2022     02/12/2022       CMP:   Lab Results   Component Value Date     02/12/2022    K 3.7 02/12/2022     02/12/2022    CO2 19 02/12/2022    BUN 5 02/12/2022    CREATININE 0.4 02/12/2022    GFRAA >60 02/12/2022    LABGLOM >60 02/12/2022    GLUCOSE 95 02/12/2022    GLUCOSE 100 05/27/2011    PROT 5.2 02/12/2022    CALCIUM 8.3 02/12/2022    BILITOT 0.2 02/12/2022    ALKPHOS 88 02/12/2022    AST 17 02/12/2022    ALT 11 02/12/2022       POC Tests: No results for input(s): POCGLU, POCNA, POCK, POCCL, POCBUN, POCHEMO, POCHCT in the last 72 hours.     Coags:   Lab Results   Component Value Date    PROTIME 13.6 02/09/2022    INR 1.2 02/09/2022    APTT 29.2 02/09/2022       HCG (If Applicable):   Lab Results Component Value Date    PREGTESTUR neg 03/27/2013        ABGs: No results found for: PHART, PO2ART, OTL2PME, LPV0RFD, BEART, X8POZMKC     Type & Screen (If Applicable):  No results found for: LABABO, LABRH    Drug/Infectious Status (If Applicable):  No results found for: HIV, HEPCAB    COVID-19 Screening (If Applicable):   Lab Results   Component Value Date    COVID19 Not Detected 02/11/2022           Anesthesia Evaluation  Patient summary reviewed no history of anesthetic complications:   Airway: Mallampati: III  TM distance: <3 FB   Neck ROM: full  Mouth opening: < 3 FB Dental: normal exam         Pulmonary:Negative Pulmonary ROS breath sounds clear to auscultation  (+) current smoker          Patient did not smoke on day of surgery. Cardiovascular:    (+) hypertension:,         Rhythm: regular             Beta Blocker:  Not on Beta Blocker         Neuro/Psych:   (+) CVA:, neuromuscular disease (PANIC ATTACKS):,              ROS comment: ALTERED MENTAL STATUS  GI/Hepatic/Renal:   (+) renal disease: kidney stones,           Endo/Other: Negative Endo/Other ROS                    Abdominal:             Vascular: negative vascular ROS. Other Findings:             Anesthesia Plan      MAC     ASA 3     (PHONE PERMISSION OBTAINED BY POA)  Induction: intravenous. MIPS: Postoperative opioids intended and Prophylactic antiemetics administered. Anesthetic plan and risks discussed with patient. Plan discussed with CRNA.             Quinten Soler MD   3/8/2022

## 2022-03-08 NOTE — ANESTHESIA PRE PROCEDURE
Department of Anesthesiology  Preprocedure Note       Name:  Renay Pimentel   Age:  46 y.o.  :  1970                                          MRN:  [id number]         Date:  3/8/2022      Surgeon: Brayden Henderson):  Antwan Anderson MD    Procedure: Procedure(s):  CYSTOSCOPY RETROGRADE PYELOGRAM URETEROSCOPY, J STENT, LASER LITHOTRIPSY    Medications prior to admission:   Prior to Admission medications    Medication Sig Start Date End Date Taking? Authorizing Provider   magnesium hydroxide (MILK OF MAGNESIA CONCENTRATE) 2400 MG/10ML SUSP Take 2,400 mg by mouth once as needed   Yes Historical Provider, MD   docusate sodium (COLACE) 100 MG capsule Take 1 capsule by mouth 2 times daily as needed for Constipation 22  Yes Rosana Chaidez MD   vitamin D3 (CHOLECALCIFEROL) 125 MCG (5000 UT) TABS tablet Take 0.5 tablets by mouth daily 22  Yes Rosana Chaidez MD   acetaminophen (TYLENOL) 325 MG tablet Take 650 mg by mouth every 6 hours as needed for Pain   Yes Historical Provider, MD   vitamin C (ASCORBIC ACID) 500 MG tablet Take 500 mg by mouth 2 times daily   Yes Historical Provider, MD   calcium carbonate (TUMS) 500 MG chewable tablet Take 2 tablets by mouth daily   Yes Historical Provider, MD   bisacodyl (DULCOLAX) 10 MG suppository Place 10 mg rectally daily as needed for Constipation   Yes Historical Provider, MD   ferrous sulfate (IRON 325) 325 (65 Fe) MG tablet Take 325 mg by mouth daily (with breakfast)   Yes Historical Provider, MD   ammonium lactate (LAC-HYDRIN) 12 % lotion Apply topically as needed for Dry Skin Apply topically as needed. Yes Historical Provider, MD   dronabinol (MARINOL) 5 MG capsule Take 5 mg by mouth 2 times daily (before meals).    Yes Historical Provider, MD   melatonin 5 MG TBDP disintegrating tablet Take 5 mg by mouth nightly   Yes Historical Provider, MD   polyethylene glycol (GLYCOLAX) 17 g packet Take 17 g by mouth daily as needed for Constipation   Yes Historical Provider, MD   Multiple Vitamins-Minerals (THERAPEUTIC MULTIVITAMIN-MINERALS) tablet Take 1 tablet by mouth daily   Yes Historical Provider, MD   sertraline (ZOLOFT) 25 MG tablet Take 25 mg by mouth daily   Yes Historical Provider, MD   zinc sulfate (ZINCATE) 220 (50 Zn) MG capsule Take 50 mg by mouth daily   Yes Historical Provider, MD   metoclopramide (REGLAN) 10 MG tablet Take 1 tablet by mouth 4 times daily 3/29/19  Yes IKE Moran CNP   atorvastatin (LIPITOR) 40 MG tablet Take 40 mg by mouth daily   Yes Historical Provider, MD       Current medications:    Current Facility-Administered Medications   Medication Dose Route Frequency Provider Last Rate Last Admin    0.9 % sodium chloride infusion   IntraVENous Continuous IKE Rust - CNP   New Bag at 03/08/22 0832    0.9 % sodium chloride infusion  25 mL IntraVENous PRN Debora Estrada APRN - CNP        sodium chloride flush 0.9 % injection 5-40 mL  5-40 mL IntraVENous 2 times per day IKE Rust - CNP        sodium chloride flush 0.9 % injection 5-40 mL  5-40 mL IntraVENous PRN Debora Estrada, APRN - CNP        sodium chloride flush 0.9 % injection 5-40 mL  5-40 mL IntraVENous 2 times per day Pattie Mccoy MD        sodium chloride flush 0.9 % injection 5-40 mL  5-40 mL IntraVENous PRN Kenyetat Dahl MD        0.9 % sodium chloride infusion  25 mL IntraVENous PRN Pattie Mccoy MD        fentaNYL (SUBLIMAZE) injection 25 mcg  25 mcg IntraVENous Q5 Min PRN Pattie Mccoy MD        ondansetron [facility]) injection 4 mg  4 mg IntraVENous Once PRN Pattie Mccoy MD         Facility-Administered Medications Ordered in Other Encounters   Medication Dose Route Frequency Provider Last Rate Last Admin    midazolam (VERSED) injection   IntraVENous PRN IKE Tracy CRNA   2 mg at 03/08/22 4182    propofol injection   IntraVENous Continuous PRN IKE Tracy - CRNA 35.4 mL/hr at 03/08/22 7585 100 mcg/kg/min at 03/08/22 9585    glycopyrrolate (ROBINUL) injection   IntraVENous PRN Jessica Rg, APRN - CRNA   0.2 mg at 03/08/22 3604    ondansetron (ZOFRAN) injection   IntraVENous PRN Jessica Arcenio, APRN - CRNA   4 mg at 03/08/22 0492    dexamethasone (DECADRON) injection   IntraVENous PRN Jessica Arcenio, APRN - CRNA   10 mg at 03/08/22 1323    lidocaine (cardiac) (XYLOCAINE) injection   IntraVENous PRN Jessica Arcenio, APRN - CRNA   60 mg at 03/08/22 0129    ketamine (KETALAR) injection   IntraVENous PRN Jessica Arcenio, APRN - CRNA   20 mg at 03/08/22 5137       Allergies: Allergies   Allergen Reactions    Bactrim [Sulfamethoxazole-Trimethoprim] Hives       Problem List:    Patient Active Problem List   Diagnosis Code    Hammer toe, acquired M20.40    Altered mental status R41.82       Past Medical History:        Diagnosis Date    Cerebral artery occlusion with cerebral infarction ([facility] [address]     Hypertension     Panic attacks        Past Surgical History:        Procedure Laterality Date    BLADDER SURGERY Bilateral 2/11/2022    CYSTOSCOPY RETROGRADE PYELOGRAM BILATERAL  STENT INSERTION performed by Donnie Eugene DO at [address] [place] [address],[facility] COLONOSCOPY      ENDOSCOPY, COLON, DIAGNOSTIC      FOOT SURGERY      right toes    FOOT SURGERY Left 3/27/13    correction 4th, 5th toes left foot    LAPAROTOMY      UPPER GASTROINTESTINAL ENDOSCOPY  05/20/2014       Social History:    Social History     Tobacco Use    Smoking status: Current Every Day Smoker     Packs/day: 1.00     Years: 20.00     Pack years: 20.00     Types: Cigarettes    Smokeless tobacco: Never Used   Substance Use Topics    Alcohol use:  No                                Ready to quit: Not Answered  Counseling given: Not Answered      Vital Signs (Current):   Vitals:    03/07/22 1033 03/08/22 0753   BP:  114/73   Pulse:  83   Resp:  16   Temp:  36.3 °C (97.4 °F)   TempSrc:  Temporal   SpO2:  97%   Weight: 130 lb 6.4 oz (59.1 kg) 130 lb (59 Kiersten Hadley, APRN - CRNA   3/8/2022

## 2022-03-08 NOTE — ANESTHESIA POSTPROCEDURE EVALUATION
Department of Anesthesiology  Postprocedure Note    Patient: Shama Blackman  MRN: [id number]  YOB: 1970  Date of evaluation: 3/8/2022  Time:  10:53 AM     Procedure Summary     Date: 03/08/22 Room / Location: [facility] OR 11 / [facility]    Anesthesia Start: 6016 Anesthesia Stop: 1612    Procedure: CYSTOSCOPY RETROGRADE PYELOGRAM URETEROSCOPY, LASER LITHOTRIPSY (Bilateral ) Diagnosis: (URETERAL STONES)    Surgeons: Douglas Evans MD Responsible Provider: Sammy Dixon MD    Anesthesia Type: MAC ASA Status: 3          Anesthesia Type: MAC    Radha Phase I:      Radha Phase II:      Last vitals: Reviewed and per EMR flowsheets.        Anesthesia Post Evaluation    Patient location during evaluation: PACU  Patient participation: complete - patient participated  Level of consciousness: awake and alert  Airway patency: patent  Nausea & Vomiting: no nausea and no vomiting  Complications: no  Cardiovascular status: hemodynamically stable  Respiratory status: acceptable  Hydration status: euvolemic    IKE recinos - CRNA

## 2022-03-08 NOTE — PROGRESS NOTES
Informed Dr. Merced Sewell that pyridium needs escribed to [facility] as [facility] do not accept transfer. Our outpatient pharmacy cannot print script since it was escribed.

## 2022-03-08 NOTE — PROGRESS NOTES
CLINICAL PHARMACY NOTE: MEDS TO BEDS    Total # of Prescriptions Filled: 2   The following medications were delivered to the patient:  · Cephalexin 250 mg  · Ibuprofen 600 mg  · pehnazopyridine 100 mg not covered being transferred out  · Prescriptions delivered to patient    Additional Documentation:

## 2022-03-08 NOTE — H&P
[facility] HISTORY AND PHYSICAL EXAM    Rocio Khan is a 46 y.o. female with recurrent nephrolithiasis. She was admitted last month with bilateral stones. Ureteral stents were placed. She presents for definitive stone management. CT scan on 2/10/2022 shows bilateral hydronephrosis with bilateral nephrolithiasis and bilateral obstructing stones within the ureters. There is a right UPJ stone and a left proximal ureteral calculus. She is feeling better after her stents were placed. She denies hematuria or UTI symptoms today. Past Medical History:   Diagnosis Date    Cerebral artery occlusion with cerebral infarction ([address]     Hypertension     Panic attacks        Past Surgical History:   Procedure Laterality Date    BLADDER SURGERY Bilateral 2/11/2022    CYSTOSCOPY RETROGRADE PYELOGRAM BILATERAL  STENT INSERTION performed by Albert Eugene DO at [facility] ENDOSCOPY, COLON, DIAGNOSTIC      FOOT SURGERY      right toes    FOOT SURGERY Left 3/27/13    correction 4th, 5th toes left foot    LAPAROTOMY      UPPER GASTROINTESTINAL ENDOSCOPY  05/20/2014       No family history on file. Prior to Admission medications    Medication Sig Start Date End Date Taking?  Authorizing Provider   magnesium hydroxide (MILK OF MAGNESIA CONCENTRATE) 2400 MG/10ML SUSP Take 2,400 mg by mouth once as needed   Yes Historical Provider, MD   docusate sodium (COLACE) 100 MG capsule Take 1 capsule by mouth 2 times daily as needed for Constipation 2/12/22  Yes Brandon Rider MD   vitamin D3 (CHOLECALCIFEROL) 125 MCG (5000 UT) TABS tablet Take 0.5 tablets by mouth daily 2/13/22  Yes Brandon Rider MD   acetaminophen (TYLENOL) 325 MG tablet Take 650 mg by mouth every 6 hours as needed for Pain   Yes Historical Provider, MD   vitamin C (ASCORBIC ACID) 500 MG tablet Take 500 mg by mouth 2 times daily   Yes Historical Provider, MD   calcium carbonate (TUMS) 500 MG chewable tablet Take 2 tablets by mouth daily   Yes Historical Provider, MD   bisacodyl (DULCOLAX) 10 MG suppository Place 10 mg rectally daily as needed for Constipation   Yes Historical Provider, MD   ferrous sulfate (IRON 325) 325 (65 Fe) MG tablet Take 325 mg by mouth daily (with breakfast)   Yes Historical Provider, MD   ammonium lactate (LAC-HYDRIN) 12 % lotion Apply topically as needed for Dry Skin Apply topically as needed. Yes Historical Provider, MD   dronabinol (MARINOL) 5 MG capsule Take 5 mg by mouth 2 times daily (before meals). Yes Historical Provider, MD   melatonin 5 MG TBDP disintegrating tablet Take 5 mg by mouth nightly   Yes Historical Provider, MD   polyethylene glycol (GLYCOLAX) 17 g packet Take 17 g by mouth daily as needed for Constipation   Yes Historical Provider, MD   Multiple Vitamins-Minerals (THERAPEUTIC MULTIVITAMIN-MINERALS) tablet Take 1 tablet by mouth daily   Yes Historical Provider, MD   sertraline (ZOLOFT) 25 MG tablet Take 25 mg by mouth daily   Yes Historical Provider, MD   zinc sulfate (ZINCATE) 220 (50 Zn) MG capsule Take 50 mg by mouth daily   Yes Historical Provider, MD   metoclopramide (REGLAN) 10 MG tablet Take 1 tablet by mouth 4 times daily 3/29/19  Yes IKE Moran CNP   atorvastatin (LIPITOR) 40 MG tablet Take 40 mg by mouth daily   Yes Historical Provider, MD        Allergies: Bactrim [sulfamethoxazole-trimethoprim]    Social History     Tobacco Use    Smoking status: Current Every Day Smoker     Packs/day: 1.00     Years: 20.00     Pack years: 20.00     Types: Cigarettes    Smokeless tobacco: Never Used   Substance Use Topics    Alcohol use: No      She is single and disabled    Review of Systems:  Respiratory: negative for cough and hemoptysis  Cardiovascular: negative for chest pain and dyspnea  Gastrointestinal: negative for abdominal pain, diarrhea, nausea and vomiting  Genitourinary: as per HPI, otherwise negative.   Derm: negative for rash and skin lesion(s)  Neurological: negative for seizures and tremors  Endocrine: negative for diabetic symptoms including polydipsia and polyuria    Physical Exam:    Skin:  Warm and dry. No rash or bruises  HEENT:  PERRLA, EOMI  Neck:  No JVD, No thyromegaly  Cardiac:  RRR  Lungs:  No audible wheezes, symmetric respirations, non-labored  Abdomen: Soft, non-tender, non-distended  Extremities:  No clubbing, edema or cyanosis  Neurological:  Moves all extremities, normal DTR    Lab Results   Component Value Date    WBC 7.6 02/12/2022    HGB 11.7 02/12/2022    HCT 34.3 02/12/2022    MCV 94.5 02/12/2022     02/12/2022       Lab Results   Component Value Date    BUN 5 (L) 02/12/2022    CREATININE 0.4 (L) 02/12/2022       Assessment and Plan:  Bilateral nephrolithiasis with indwelling stents  She is NPO. To be taken the operating room on 3/8/2022 to undergo complex bilateral ureteroscopy with laser lithotripsy and stent exchange versus removal.  She will receive IV antibiotics for UTI prophylaxis. This will be done as an outpatient under anesthesia will take approximate 1 to 2 hours.   The risks and benefits of the procedure including but not limited to infection, bleeding, possible inability to remove all stones requiring further procedures, possible need for persistent stents with stent irritation etc. were discussed in detail and she elected to proceed      Electronically signed by Coty Dean MD on 3/8/2022 at 7:23 AM

## 2022-03-10 LAB — URINE CULTURE, ROUTINE: NORMAL

## 2022-03-12 NOTE — H&P
[facility] HISTORY AND PHYSICAL EXAM     Tigist Stephens is a 46 y.o. female with recurrent nephrolithiasis. She was admitted last month with bilateral stones. Ureteral stents were placed. She had bilateral ureteroscopy and laser lithotripsy on 3/8/22. She presents for a second look procedure. CT scan on 2/10/2022 shows bilateral hydronephrosis with bilateral nephrolithiasis and bilateral obstructing stones within the ureters. There was a right UPJ stone and a left proximal ureteral calculus. She is feeling better. She denies hematuria or UTI symptoms today.     Past Medical History        Past Medical History:   Diagnosis Date    Cerebral artery occlusion with cerebral infarction ([facility] [address]      Hypertension      Panic attacks              Past Surgical History         Past Surgical History:   Procedure Laterality Date    BLADDER SURGERY Bilateral 2/11/2022     CYSTOSCOPY RETROGRADE PYELOGRAM BILATERAL  STENT INSERTION performed by Antione Eugene DO at [address] [place] [address]        ENDOSCOPY, COLON, DIAGNOSTIC        FOOT SURGERY         right toes    FOOT SURGERY Left 3/27/13     correction 4th, 5th toes left foot    LAPAROTOMY        UPPER GASTROINTESTINAL ENDOSCOPY   05/20/2014            Family History   No family history on file.        Home Medications           Prior to Admission medications    Medication Sig Start Date End Date Taking?  Authorizing Provider   magnesium hydroxide (MILK OF MAGNESIA CONCENTRATE) 2400 MG/10ML SUSP Take 2,400 mg by mouth once as needed     Yes Historical Provider, MD   docusate sodium (COLACE) 100 MG capsule Take 1 capsule by mouth 2 times daily as needed for Constipation 2/12/22   Yes Yeimi Curtis MD   vitamin D3 (CHOLECALCIFEROL) 125 MCG (5000 UT) TABS tablet Take 0.5 tablets by mouth daily 2/13/22   Yes Yeimi Curtis MD   acetaminophen (TYLENOL) 325 MG tablet Take 650 mg by mouth every 6 hours as needed for Pain     Yes Historical Provider, MD   vitamin C (ASCORBIC ACID) 500 MG tablet Take 500 mg by mouth 2 times daily     Yes Historical Provider, MD   calcium carbonate (TUMS) 500 MG chewable tablet Take 2 tablets by mouth daily     Yes Historical Provider, MD   bisacodyl (DULCOLAX) 10 MG suppository Place 10 mg rectally daily as needed for Constipation     Yes Historical Provider, MD   ferrous sulfate (IRON 325) 325 (65 Fe) MG tablet Take 325 mg by mouth daily (with breakfast)     Yes Historical Provider, MD   ammonium lactate (LAC-HYDRIN) 12 % lotion Apply topically as needed for Dry Skin Apply topically as needed.     Yes Historical Provider, MD   dronabinol (MARINOL) 5 MG capsule Take 5 mg by mouth 2 times daily (before meals).     Yes Historical Provider, MD   melatonin 5 MG TBDP disintegrating tablet Take 5 mg by mouth nightly     Yes Historical Provider, MD   polyethylene glycol (GLYCOLAX) 17 g packet Take 17 g by mouth daily as needed for Constipation     Yes Historical Provider, MD   Multiple Vitamins-Minerals (THERAPEUTIC MULTIVITAMIN-MINERALS) tablet Take 1 tablet by mouth daily     Yes Historical Provider, MD   sertraline (ZOLOFT) 25 MG tablet Take 25 mg by mouth daily     Yes Historical Provider, MD   zinc sulfate (ZINCATE) 220 (50 Zn) MG capsule Take 50 mg by mouth daily     Yes Historical Provider, MD   metoclopramide (REGLAN) 10 MG tablet Take 1 tablet by mouth 4 times daily 3/29/19   Yes IKE Graff CNP   atorvastatin (LIPITOR) 40 MG tablet Take 40 mg by mouth daily     Yes Historical Provider, MD            Allergies: Bactrim [sulfamethoxazole-trimethoprim]     Social History            Tobacco Use    Smoking status: Current Every Day Smoker       Packs/day: 1.00       Years: 20.00       Pack years: 20.00       Types: Cigarettes    Smokeless tobacco: Never Used   Substance Use Topics    Alcohol use: No      She is single and disabled     Review of Systems:  Respiratory: negative for cough and hemoptysis  Cardiovascular: negative for chest pain and dyspnea  Gastrointestinal: negative for abdominal pain, diarrhea, nausea and vomiting  Genitourinary: as per HPI, otherwise negative. Derm: negative for rash and skin lesion(s)  Neurological: negative for seizures and tremors  Endocrine: negative for diabetic symptoms including polydipsia and polyuria     Physical Exam:     Skin:  Warm and dry. No rash or bruises  HEENT:  PERRLA, EOMI  Neck:  No JVD, No thyromegaly  Cardiac:  RRR  Lungs:  No audible wheezes, symmetric respirations, non-labored  Abdomen: Soft, non-tender, non-distended  Extremities:  No clubbing, edema or cyanosis  Neurological:  Moves all extremities, normal DTR     Lab Results   Component Value Date     WBC 7.6 02/12/2022     HGB 11.7 02/12/2022     HCT 34.3 02/12/2022     MCV 94.5 02/12/2022      02/12/2022         Lab Results   Component Value Date     BUN 5 (L) 02/12/2022     CREATININE 0.4 (L) 02/12/2022         Assessment and Plan:  Bilateral nephrolithiasis with indwelling stents  She is NPO. To be taken the operating room on 4/12/2022 to undergo complex bilateral ureteroscopy with laser lithotripsy and stent exchange versus removal.  She will receive IV antibiotics for UTI prophylaxis. This will be done as an outpatient under anesthesia will take approximate 1 to 2 hours.   The risks and benefits of the procedure including but not limited to infection, bleeding, possible inability to remove all stones requiring further procedures, possible need for persistent stents with stent irritation etc. were discussed in detail and she elected to proceed

## 2022-03-14 LAB
CALCULI COMPOSITION: NORMAL
MASS: 7 MG
STONE DESCRIPTION: NORMAL

## 2022-04-06 RX ORDER — MULTIVIT-MIN/IRON/FOLIC ACID/K 18-600-40
CAPSULE ORAL DAILY
COMMUNITY

## 2022-04-06 RX ORDER — MIRTAZAPINE 15 MG/1
15 TABLET, FILM COATED ORAL DAILY
COMMUNITY

## 2022-04-06 RX ORDER — PHENAZOPYRIDINE HYDROCHLORIDE 100 MG/1
100 TABLET, FILM COATED ORAL 3 TIMES DAILY PRN
COMMUNITY

## 2022-04-06 NOTE — PROGRESS NOTES
Called for patient home number, which is [phone]. was informed patient is a resident there. Requested to speak with nurse caring for patient was informed patient is not available. Discussed need information as patient is scheduled for surgery on 4/12 she provided fax number [phone] to fax requested information.
the nurse. [x] Take the following medications the morning of surgery with 1-2 ounces of water: SERTRALINE  [x] Stop herbal supplements, ibuprofen (Nsaids)  and vitamins 5 days before your surgery. WHAT TO EXPECT:  [x] The day of surgery you will be greeted and checked in by the [facility] [employer]. Please bring your photo ID and insurance card. A nurse will greet you in accordance to the time you are needed in the pre-op area to prepare you for surgery. [x]  Delays may occur with surgery and staff will make a sincere effort to keep you informed of delays. If any delays occur with your procedure, we apologize ahead of time for your inconvenience as we recognize the value of your time.

## 2022-04-11 ENCOUNTER — ANESTHESIA EVENT (OUTPATIENT)
Dept: OPERATING ROOM | Age: 52
End: 2022-04-11
Payer: COMMERCIAL

## 2022-04-12 ENCOUNTER — ANESTHESIA (OUTPATIENT)
Dept: OPERATING ROOM | Age: 52
End: 2022-04-12
Payer: COMMERCIAL

## 2022-04-12 ENCOUNTER — APPOINTMENT (OUTPATIENT)
Dept: GENERAL RADIOLOGY | Age: 52
End: 2022-04-12
Attending: UROLOGY
Payer: COMMERCIAL

## 2022-04-12 ENCOUNTER — HOSPITAL ENCOUNTER (OUTPATIENT)
Age: 52
Setting detail: OUTPATIENT SURGERY
Discharge: OTHER INSTITUTION | End: 2022-04-12
Attending: UROLOGY | Admitting: UROLOGY
Payer: COMMERCIAL

## 2022-04-12 VITALS
TEMPERATURE: 98 F | HEART RATE: 80 BPM | SYSTOLIC BLOOD PRESSURE: 122 MMHG | HEIGHT: 62 IN | BODY MASS INDEX: 22.45 KG/M2 | WEIGHT: 122 LBS | DIASTOLIC BLOOD PRESSURE: 81 MMHG | OXYGEN SATURATION: 99 % | RESPIRATION RATE: 20 BRPM

## 2022-04-12 VITALS — TEMPERATURE: 97.2 F | DIASTOLIC BLOOD PRESSURE: 101 MMHG | OXYGEN SATURATION: 91 % | SYSTOLIC BLOOD PRESSURE: 134 MMHG

## 2022-04-12 PROCEDURE — 2720000010 HC SURG SUPPLY STERILE: Performed by: UROLOGY

## 2022-04-12 PROCEDURE — 2709999900 HC NON-CHARGEABLE SUPPLY: Performed by: UROLOGY

## 2022-04-12 PROCEDURE — 3700000000 HC ANESTHESIA ATTENDED CARE: Performed by: UROLOGY

## 2022-04-12 PROCEDURE — C1769 GUIDE WIRE: HCPCS | Performed by: UROLOGY

## 2022-04-12 PROCEDURE — 6360000002 HC RX W HCPCS: Performed by: UROLOGY

## 2022-04-12 PROCEDURE — 2580000003 HC RX 258

## 2022-04-12 PROCEDURE — 2580000003 HC RX 258: Performed by: UROLOGY

## 2022-04-12 PROCEDURE — 3209999900 FLUORO FOR SURGICAL PROCEDURES

## 2022-04-12 PROCEDURE — 3600000003 HC SURGERY LEVEL 3 BASE: Performed by: UROLOGY

## 2022-04-12 PROCEDURE — 7100000010 HC PHASE II RECOVERY - FIRST 15 MIN: Performed by: UROLOGY

## 2022-04-12 PROCEDURE — C2617 STENT, NON-COR, TEM W/O DEL: HCPCS | Performed by: UROLOGY

## 2022-04-12 PROCEDURE — 2500000003 HC RX 250 WO HCPCS

## 2022-04-12 PROCEDURE — 3600000013 HC SURGERY LEVEL 3 ADDTL 15MIN: Performed by: UROLOGY

## 2022-04-12 PROCEDURE — 3700000001 HC ADD 15 MINUTES (ANESTHESIA): Performed by: UROLOGY

## 2022-04-12 PROCEDURE — 7100000011 HC PHASE II RECOVERY - ADDTL 15 MIN: Performed by: UROLOGY

## 2022-04-12 PROCEDURE — 6360000002 HC RX W HCPCS

## 2022-04-12 DEVICE — URETERAL STENT WITH SIDE HOLES 7FX28CM
Type: IMPLANTABLE DEVICE | Site: URETER | Status: FUNCTIONAL
Brand: TRIA™ FIRM

## 2022-04-12 DEVICE — URETERAL STENT
Type: IMPLANTABLE DEVICE | Site: URETER | Status: FUNCTIONAL
Brand: PERCUFLEX™

## 2022-04-12 RX ORDER — ONDANSETRON 2 MG/ML
4 INJECTION INTRAMUSCULAR; INTRAVENOUS EVERY 6 HOURS PRN
Status: DISCONTINUED | OUTPATIENT
Start: 2022-04-12 | End: 2022-04-12 | Stop reason: HOSPADM

## 2022-04-12 RX ORDER — IBUPROFEN 600 MG/1
600 TABLET ORAL EVERY 8 HOURS PRN
Qty: 20 TABLET | Refills: 0 | Status: SHIPPED | OUTPATIENT
Start: 2022-04-12

## 2022-04-12 RX ORDER — FENTANYL CITRATE 50 UG/ML
INJECTION, SOLUTION INTRAMUSCULAR; INTRAVENOUS PRN
Status: DISCONTINUED | OUTPATIENT
Start: 2022-04-12 | End: 2022-04-12 | Stop reason: SDUPTHER

## 2022-04-12 RX ORDER — DEXAMETHASONE SODIUM PHOSPHATE 10 MG/ML
INJECTION INTRAMUSCULAR; INTRAVENOUS PRN
Status: DISCONTINUED | OUTPATIENT
Start: 2022-04-12 | End: 2022-04-12 | Stop reason: SDUPTHER

## 2022-04-12 RX ORDER — SODIUM CHLORIDE 0.9 % (FLUSH) 0.9 %
5-40 SYRINGE (ML) INJECTION EVERY 12 HOURS SCHEDULED
Status: DISCONTINUED | OUTPATIENT
Start: 2022-04-12 | End: 2022-04-12 | Stop reason: HOSPADM

## 2022-04-12 RX ORDER — ONDANSETRON 2 MG/ML
4 INJECTION INTRAMUSCULAR; INTRAVENOUS
Status: DISCONTINUED | OUTPATIENT
Start: 2022-04-12 | End: 2022-04-12 | Stop reason: HOSPADM

## 2022-04-12 RX ORDER — SODIUM CHLORIDE 0.9 % (FLUSH) 0.9 %
10 SYRINGE (ML) INJECTION PRN
Status: DISCONTINUED | OUTPATIENT
Start: 2022-04-12 | End: 2022-04-12 | Stop reason: HOSPADM

## 2022-04-12 RX ORDER — PROPOFOL 10 MG/ML
INJECTION, EMULSION INTRAVENOUS PRN
Status: DISCONTINUED | OUTPATIENT
Start: 2022-04-12 | End: 2022-04-12

## 2022-04-12 RX ORDER — PROPOFOL 10 MG/ML
INJECTION, EMULSION INTRAVENOUS PRN
Status: DISCONTINUED | OUTPATIENT
Start: 2022-04-12 | End: 2022-04-12 | Stop reason: SDUPTHER

## 2022-04-12 RX ORDER — SODIUM CHLORIDE 9 MG/ML
INJECTION, SOLUTION INTRAVENOUS CONTINUOUS
Status: DISCONTINUED | OUTPATIENT
Start: 2022-04-12 | End: 2022-04-12 | Stop reason: HOSPADM

## 2022-04-12 RX ORDER — CEPHALEXIN 250 MG/1
250 CAPSULE ORAL 3 TIMES DAILY
Qty: 12 CAPSULE | Refills: 0 | Status: SHIPPED | OUTPATIENT
Start: 2022-04-12

## 2022-04-12 RX ORDER — HYDRALAZINE HYDROCHLORIDE 20 MG/ML
5 INJECTION INTRAMUSCULAR; INTRAVENOUS
Status: DISCONTINUED | OUTPATIENT
Start: 2022-04-12 | End: 2022-04-12 | Stop reason: HOSPADM

## 2022-04-12 RX ORDER — MIDAZOLAM HYDROCHLORIDE 1 MG/ML
INJECTION INTRAMUSCULAR; INTRAVENOUS PRN
Status: DISCONTINUED | OUTPATIENT
Start: 2022-04-12 | End: 2022-04-12 | Stop reason: SDUPTHER

## 2022-04-12 RX ORDER — DROPERIDOL 2.5 MG/ML
0.62 INJECTION, SOLUTION INTRAMUSCULAR; INTRAVENOUS
Status: DISCONTINUED | OUTPATIENT
Start: 2022-04-12 | End: 2022-04-12 | Stop reason: HOSPADM

## 2022-04-12 RX ORDER — IPRATROPIUM BROMIDE AND ALBUTEROL SULFATE 2.5; .5 MG/3ML; MG/3ML
1 SOLUTION RESPIRATORY (INHALATION)
Status: DISCONTINUED | OUTPATIENT
Start: 2022-04-12 | End: 2022-04-12 | Stop reason: HOSPADM

## 2022-04-12 RX ORDER — LABETALOL HYDROCHLORIDE 5 MG/ML
5 INJECTION, SOLUTION INTRAVENOUS
Status: DISCONTINUED | OUTPATIENT
Start: 2022-04-12 | End: 2022-04-12 | Stop reason: HOSPADM

## 2022-04-12 RX ORDER — SODIUM CHLORIDE 9 MG/ML
25 INJECTION, SOLUTION INTRAVENOUS PRN
Status: DISCONTINUED | OUTPATIENT
Start: 2022-04-12 | End: 2022-04-12 | Stop reason: HOSPADM

## 2022-04-12 RX ORDER — PHENAZOPYRIDINE HYDROCHLORIDE 100 MG/1
100 TABLET, FILM COATED ORAL 3 TIMES DAILY PRN
Status: DISCONTINUED | OUTPATIENT
Start: 2022-04-12 | End: 2022-04-12 | Stop reason: HOSPADM

## 2022-04-12 RX ORDER — MEPERIDINE HYDROCHLORIDE 25 MG/ML
12.5 INJECTION INTRAMUSCULAR; INTRAVENOUS; SUBCUTANEOUS EVERY 5 MIN PRN
Status: DISCONTINUED | OUTPATIENT
Start: 2022-04-12 | End: 2022-04-12 | Stop reason: HOSPADM

## 2022-04-12 RX ORDER — LIDOCAINE HYDROCHLORIDE 20 MG/ML
INJECTION, SOLUTION INTRAVENOUS PRN
Status: DISCONTINUED | OUTPATIENT
Start: 2022-04-12 | End: 2022-04-12 | Stop reason: SDUPTHER

## 2022-04-12 RX ORDER — ONDANSETRON 2 MG/ML
INJECTION INTRAMUSCULAR; INTRAVENOUS PRN
Status: DISCONTINUED | OUTPATIENT
Start: 2022-04-12 | End: 2022-04-12 | Stop reason: SDUPTHER

## 2022-04-12 RX ORDER — DIPHENHYDRAMINE HYDROCHLORIDE 50 MG/ML
12.5 INJECTION INTRAMUSCULAR; INTRAVENOUS
Status: DISCONTINUED | OUTPATIENT
Start: 2022-04-12 | End: 2022-04-12 | Stop reason: HOSPADM

## 2022-04-12 RX ORDER — PROPOFOL 10 MG/ML
INJECTION, EMULSION INTRAVENOUS CONTINUOUS PRN
Status: DISCONTINUED | OUTPATIENT
Start: 2022-04-12 | End: 2022-04-12 | Stop reason: SDUPTHER

## 2022-04-12 RX ORDER — SODIUM CHLORIDE, SODIUM LACTATE, POTASSIUM CHLORIDE, CALCIUM CHLORIDE 600; 310; 30; 20 MG/100ML; MG/100ML; MG/100ML; MG/100ML
INJECTION, SOLUTION INTRAVENOUS CONTINUOUS PRN
Status: DISCONTINUED | OUTPATIENT
Start: 2022-04-12 | End: 2022-04-12 | Stop reason: SDUPTHER

## 2022-04-12 RX ORDER — SODIUM CHLORIDE 0.9 % (FLUSH) 0.9 %
10 SYRINGE (ML) INJECTION EVERY 12 HOURS SCHEDULED
Status: DISCONTINUED | OUTPATIENT
Start: 2022-04-12 | End: 2022-04-12 | Stop reason: HOSPADM

## 2022-04-12 RX ORDER — OXYCODONE HYDROCHLORIDE 5 MG/1
5 TABLET ORAL
Status: DISCONTINUED | OUTPATIENT
Start: 2022-04-12 | End: 2022-04-12 | Stop reason: HOSPADM

## 2022-04-12 RX ORDER — KETAMINE HCL IN NACL, ISO-OSM 100MG/10ML
SYRINGE (ML) INJECTION PRN
Status: DISCONTINUED | OUTPATIENT
Start: 2022-04-12 | End: 2022-04-12 | Stop reason: SDUPTHER

## 2022-04-12 RX ORDER — IBUPROFEN 400 MG/1
600 TABLET ORAL EVERY 8 HOURS PRN
Status: DISCONTINUED | OUTPATIENT
Start: 2022-04-12 | End: 2022-04-12 | Stop reason: HOSPADM

## 2022-04-12 RX ORDER — SODIUM CHLORIDE 0.9 % (FLUSH) 0.9 %
5-40 SYRINGE (ML) INJECTION PRN
Status: DISCONTINUED | OUTPATIENT
Start: 2022-04-12 | End: 2022-04-12 | Stop reason: HOSPADM

## 2022-04-12 RX ORDER — PHENAZOPYRIDINE HYDROCHLORIDE 100 MG/1
100 TABLET, FILM COATED ORAL 3 TIMES DAILY PRN
Qty: 20 TABLET | Refills: 0 | Status: SHIPPED | OUTPATIENT
Start: 2022-04-12 | End: 2022-04-19

## 2022-04-12 RX ADMIN — PROPOFOL 80 MG: 10 INJECTION, EMULSION INTRAVENOUS at 07:10

## 2022-04-12 RX ADMIN — SODIUM CHLORIDE: 9 INJECTION, SOLUTION INTRAVENOUS at 06:41

## 2022-04-12 RX ADMIN — Medication 2000 MG: at 07:12

## 2022-04-12 RX ADMIN — FENTANYL CITRATE 25 MCG: 50 INJECTION, SOLUTION INTRAMUSCULAR; INTRAVENOUS at 07:55

## 2022-04-12 RX ADMIN — SODIUM CHLORIDE, POTASSIUM CHLORIDE, SODIUM LACTATE AND CALCIUM CHLORIDE: 600; 310; 30; 20 INJECTION, SOLUTION INTRAVENOUS at 07:59

## 2022-04-12 RX ADMIN — LIDOCAINE HYDROCHLORIDE 30 MG: 20 INJECTION, SOLUTION INTRAVENOUS at 07:10

## 2022-04-12 RX ADMIN — FENTANYL CITRATE 50 MCG: 50 INJECTION, SOLUTION INTRAMUSCULAR; INTRAVENOUS at 07:25

## 2022-04-12 RX ADMIN — ONDANSETRON 4 MG: 2 INJECTION INTRAMUSCULAR; INTRAVENOUS at 07:32

## 2022-04-12 RX ADMIN — SODIUM CHLORIDE: 9 INJECTION, SOLUTION INTRAVENOUS at 06:55

## 2022-04-12 RX ADMIN — FENTANYL CITRATE 25 MCG: 50 INJECTION, SOLUTION INTRAMUSCULAR; INTRAVENOUS at 08:20

## 2022-04-12 RX ADMIN — PROPOFOL 100 MCG/KG/MIN: 10 INJECTION, EMULSION INTRAVENOUS at 07:10

## 2022-04-12 RX ADMIN — MIDAZOLAM 2 MG: 1 INJECTION INTRAMUSCULAR; INTRAVENOUS at 06:55

## 2022-04-12 RX ADMIN — Medication 20 MG: at 07:10

## 2022-04-12 RX ADMIN — DEXAMETHASONE SODIUM PHOSPHATE 10 MG: 10 INJECTION INTRAMUSCULAR; INTRAVENOUS at 07:32

## 2022-04-12 ASSESSMENT — PAIN SCALES - GENERAL
PAINLEVEL_OUTOF10: 0

## 2022-04-12 ASSESSMENT — LIFESTYLE VARIABLES: SMOKING_STATUS: 1

## 2022-04-12 ASSESSMENT — PAIN - FUNCTIONAL ASSESSMENT: PAIN_FUNCTIONAL_ASSESSMENT: 0-10

## 2022-04-12 NOTE — H&P
[facility] HISTORY AND PHYSICAL EXAM     Silvana Moses is a 46 y. o. female with recurrent nephrolithiasis. Monica Ireland was admitted last month with bilateral stones.  Ureteral stents were placed.  She had bilateral ureteroscopy and laser lithotripsy on 3/8/22. She presents for a second look procedure.  CT scan on 2/10/2022 shows bilateral hydronephrosis with bilateral nephrolithiasis and bilateral obstructing stones within the ureters.  There was a right UPJ stone and a left proximal ureteral calculus.  She is feeling better. Monica Ireland denies hematuria or UTI symptoms today.     Past Medical History           Past Medical History:   Diagnosis Date    Cerebral artery occlusion with cerebral infarction ([facility] [address]      Hypertension      Panic attacks              Past Surgical History             Past Surgical History:   Procedure Laterality Date    BLADDER SURGERY Bilateral 2/11/2022     CYSTOSCOPY RETROGRADE PYELOGRAM BILATERAL  STENT INSERTION performed by Lilibeth Eugene DO at [facility], COLON, DIAGNOSTIC        FOOT SURGERY         right toes    FOOT SURGERY Left 3/27/13     correction 4th, 5th toes left foot    LAPAROTOMY        UPPER GASTROINTESTINAL ENDOSCOPY   05/20/2014            Family History   No family history on file.         Home Medications                 Prior to Admission medications    Medication Sig Start Date End Date Taking?  Authorizing Provider   magnesium hydroxide (MILK OF MAGNESIA CONCENTRATE) 2400 MG/10ML SUSP Take 2,400 mg by mouth once as needed     Yes Historical Provider, MD   docusate sodium (COLACE) 100 MG capsule Take 1 capsule by mouth 2 times daily as needed for Constipation 2/12/22   Yes Ivonne Dao MD   vitamin D3 (CHOLECALCIFEROL) 125 MCG (5000 UT) TABS tablet Take 0.5 tablets by mouth daily 2/13/22   Yes Ivonne Dao MD   acetaminophen (TYLENOL) 325 MG tablet Take 650 mg by mouth every 6 hours as needed for Pain     Yes Historical Provider, MD   vitamin C (ASCORBIC ACID) 500 MG tablet Take 500 mg by mouth 2 times daily     Yes Historical Provider, MD   calcium carbonate (TUMS) 500 MG chewable tablet Take 2 tablets by mouth daily     Yes Historical Provider, MD   bisacodyl (DULCOLAX) 10 MG suppository Place 10 mg rectally daily as needed for Constipation     Yes Historical Provider, MD   ferrous sulfate (IRON 325) 325 (65 Fe) MG tablet Take 325 mg by mouth daily (with breakfast)     Yes Historical Provider, MD   ammonium lactate (LAC-HYDRIN) 12 % lotion Apply topically as needed for Dry Skin Apply topically as needed.     Yes Historical Provider, MD   dronabinol (MARINOL) 5 MG capsule Take 5 mg by mouth 2 times daily (before meals).     Yes Historical Provider, MD   melatonin 5 MG TBDP disintegrating tablet Take 5 mg by mouth nightly     Yes Historical Provider, MD   polyethylene glycol (GLYCOLAX) 17 g packet Take 17 g by mouth daily as needed for Constipation     Yes Historical Provider, MD   Multiple Vitamins-Minerals (THERAPEUTIC MULTIVITAMIN-MINERALS) tablet Take 1 tablet by mouth daily     Yes Historical Provider, MD   sertraline (ZOLOFT) 25 MG tablet Take 25 mg by mouth daily     Yes Historical Provider, MD   zinc sulfate (ZINCATE) 220 (50 Zn) MG capsule Take 50 mg by mouth daily     Yes Historical Provider, MD   metoclopramide (REGLAN) 10 MG tablet Take 1 tablet by mouth 4 times daily 3/29/19   Yes IKE Fowler - CNP   atorvastatin (LIPITOR) 40 MG tablet Take 40 mg by mouth daily     Yes Historical Provider, MD            Allergies: Bactrim [sulfamethoxazole-trimethoprim]     Social History                Tobacco Use    Smoking status: Current Every Day Smoker       Packs/day: 1.00       Years: 20.00       Pack years: 20.00       Types: Cigarettes    Smokeless tobacco: Never Used   Substance Use Topics    Alcohol use:  No      She is single and disabled     Review of Systems:  Respiratory: negative for cough and hemoptysis  Cardiovascular: negative for chest pain and dyspnea  Gastrointestinal: negative for abdominal pain, diarrhea, nausea and vomiting  Genitourinary: as per HPI, otherwise negative.   Derm: negative for rash and skin lesion(s)  Neurological: negative for seizures and tremors  Endocrine: negative for diabetic symptoms including polydipsia and polyuria     Physical Exam:     Skin:  Warm and dry.  No rash or bruises  HEENT:  PERRLA, EOMI  Neck:  No JVD, No thyromegaly  Cardiac:  RRR  Lungs:  No audible wheezes, symmetric respirations, non-labored  Abdomen: Soft, non-tender, non-distended  Extremities:  No clubbing, edema or cyanosis  Neurological:  Moves all extremities, normal DTR           Lab Results   Component Value Date     WBC 7.6 02/12/2022     HGB 11.7 02/12/2022     HCT 34.3 02/12/2022     MCV 94.5 02/12/2022      02/12/2022               Lab Results   Component Value Date     BUN 5 (L) 02/12/2022     CREATININE 0.4 (L) 02/12/2022         Assessment and Plan:  Bilateral nephrolithiasis with indwelling stents  She is NPO.  To be taken the operating room on 4/12/2022 to undergo complex bilateral ureteroscopy with laser lithotripsy and stent exchange versus removal.  She will receive IV antibiotics for UTI prophylaxis.  This will be done as an outpatient under anesthesia will take approximate 1 to 2 hours.  The risks and benefits of the procedure including but not limited to infection, bleeding, possible inability to remove all stones requiring further procedures, possible need for persistent stents with stent irritation etc. were discussed in detail and she elected to proceed

## 2022-04-12 NOTE — ANESTHESIA PRE PROCEDURE
Department of Anesthesiology  Preprocedure Note       Name:  Jose Heath   Age:  46 y.o.  :  1970                                          MRN:  [id number]         Date:  2022      Surgeon: Anila Espino):  Theodora Hebert MD    Procedure: Procedure(s):  CYSTOSCOPY RETROGRADE PYELOGRAM,  URETEROSCOPY,  J STENT,  LASER LITHOTRIPSY BILATERAL(FACILITY)    Medications prior to admission:   Prior to Admission medications    Medication Sig Start Date End Date Taking?  Authorizing Provider   phenazopyridine (PYRIDIUM) 100 MG tablet Take 100 mg by mouth 3 times daily as needed for Pain   Yes Historical Provider, MD   mirtazapine (REMERON) 15 MG tablet Take 15 mg by mouth daily Indications: appetite stimulant   Yes Historical Provider, MD   Cholecalciferol (VITAMIN D) 50 MCG ( UT) CAPS capsule Take by mouth daily   Yes Historical Provider, MD   ibuprofen (ADVIL;MOTRIN) 600 MG tablet Take 1 tablet by mouth every 8 hours as needed for Pain 3/8/22   Theodora Hebert MD   magnesium hydroxide (MILK OF MAGNESIA CONCENTRATE) 2400 MG/10ML SUSP Take 2,400 mg by mouth once as needed    Historical Provider, MD   docusate sodium (COLACE) 100 MG capsule Take 1 capsule by mouth 2 times daily as needed for Constipation 22   Tracee Laws MD   acetaminophen (TYLENOL) 325 MG tablet Take 650 mg by mouth every 4 hours as needed for Pain or Fever     Historical Provider, MD   vitamin C (ASCORBIC ACID) 500 MG tablet Take 500 mg by mouth 2 times daily    Historical Provider, MD   calcium carbonate (TUMS) 500 MG chewable tablet Take 2 tablets by mouth daily    Historical Provider, MD   bisacodyl (DULCOLAX) 10 MG suppository Place 10 mg rectally daily as needed for Constipation    Historical Provider, MD   ferrous sulfate (IRON 325) 325 (65 Fe) MG tablet Take 325 mg by mouth daily (with breakfast)    Historical Provider, MD   ammonium lactate (LAC-HYDRIN) 12 % lotion Apply topically as needed for Dry Skin Apply topically as needed. Historical Provider, MD   melatonin 5 MG TBDP disintegrating tablet Take 5 mg by mouth nightly    Historical Provider, MD   polyethylene glycol (GLYCOLAX) 17 g packet Take 17 g by mouth daily as needed for Constipation    Historical Provider, MD   Multiple Vitamins-Minerals (THERAPEUTIC MULTIVITAMIN-MINERALS) tablet Take 1 tablet by mouth daily    Historical Provider, MD   sertraline (ZOLOFT) 25 MG tablet Take 25 mg by mouth daily    Historical Provider, MD   zinc sulfate (ZINCATE) 220 (50 Zn) MG capsule Take 50 mg by mouth daily    Historical Provider, MD   metoclopramide (REGLAN) 10 MG tablet Take 1 tablet by mouth 4 times daily 3/29/19   IKE Torre CNP   atorvastatin (LIPITOR) 40 MG tablet Take 40 mg by mouth daily    Historical Provider, MD       Current medications:    Current Facility-Administered Medications   Medication Dose Route Frequency Provider Last Rate Last Admin    0.9 % sodium chloride infusion   IntraVENous Continuous Jewels Triplett  mL/hr at 04/12/22 0641 New Bag at 04/12/22 0641    sodium chloride flush 0.9 % injection 10 mL  10 mL IntraVENous 2 times per day Jewels Triplett MD        sodium chloride flush 0.9 % injection 10 mL  10 mL IntraVENous PRN Osmar aMys MD        0.9 % sodium chloride infusion  25 mL IntraVENous PRN Jewels Triplett MD        ceFAZolin (ANCEF) 2000 mg in sterile water 20 mL IV syringe  2,000 mg IntraVENous On Call to [facility], MD           Allergies:     Allergies   Allergen Reactions    Bactrim [Sulfamethoxazole-Trimethoprim] Hives       Problem List:    Patient Active Problem List   Diagnosis Code    Hammer toe, acquired M20.40    Altered mental status R41.82       Past Medical History:        Diagnosis Date    Altered mental status, unspecified     Anemia, iron deficiency     Anemia, unspecified     Cerebral artery occlusion with cerebral infarction ([facility] [address]     Dysphagia following cerebral infarction     Gastroesophageal reflux disease     without esophagitis    Hemiplegia and hemiparesis following cerebral infarction affecting left non-dominant side (HCC)     Hypertension     Intestinal malabsorption, unspecified     Major depressive disorder, recurrent ([facility] [address]     Panic attacks        Past Surgical History:        Procedure Laterality Date    BLADDER SURGERY Bilateral 2/11/2022    CYSTOSCOPY RETROGRADE PYELOGRAM BILATERAL  STENT INSERTION performed by Robert Eugnee DO at [address] [facility], COLON, DIAGNOSTIC      FOOT SURGERY      right toes    FOOT SURGERY Left 3/27/13    correction 4th, 5th toes left foot    LAPAROTOMY      LITHOTRIPSY Bilateral 3/8/2022    CYSTOSCOPY RETROGRADE PYELOGRAM URETEROSCOPY, LASER LITHOTRIPSY performed by Nano Jones MD at [facility].  05/20/2014       Social History:    Social History     Tobacco Use    Smoking status: Current Every Day Smoker     Packs/day: 1.00     Years: 20.00     Pack years: 20.00     Types: Cigarettes    Smokeless tobacco: Never Used   Substance Use Topics    Alcohol use: No                                Ready to quit: Not Answered  Counseling given: Not Answered      Vital Signs (Current):   Vitals:    04/06/22 1457 04/12/22 0618   BP:  109/68   Pulse:  79   Resp:  16   Temp:  36.4 °C (97.5 °F)   TempSrc:  Temporal   SpO2:  95%   Weight: 122 lb 7 oz (55.5 kg) 122 lb (55.3 kg)   Height: 5' 2\" (1.575 m) 5' 2\" (1.575 m)                                              BP Readings from Last 3 Encounters:   04/12/22 109/68   03/08/22 (!) 125/91   03/08/22 121/83       NPO Status:                                                                                 BMI:   Wt Readings from Last 3 Encounters:   04/12/22 122 lb (55.3 kg)   03/08/22 130 lb (59 kg)   02/09/22 127 lb 8 oz (57.8 kg)     Body mass index is 22.31 kg/m².     CBC:   Lab Results   Component Value Date    WBC 7.6 02/12/2022    RBC 3.63 02/12/2022    HGB 11.7 02/12/2022    HCT 34.3 02/12/2022    MCV 94.5 02/12/2022    RDW 12.0 02/12/2022     02/12/2022       CMP:   Lab Results   Component Value Date     02/12/2022    K 3.7 02/12/2022     02/12/2022    CO2 19 02/12/2022    BUN 5 02/12/2022    CREATININE 0.4 02/12/2022    GFRAA >60 02/12/2022    LABGLOM >60 02/12/2022    GLUCOSE 95 02/12/2022    GLUCOSE 100 05/27/2011    PROT 5.2 02/12/2022    CALCIUM 8.3 02/12/2022    BILITOT 0.2 02/12/2022    ALKPHOS 88 02/12/2022    AST 17 02/12/2022    ALT 11 02/12/2022       POC Tests: No results for input(s): POCGLU, POCNA, POCK, POCCL, POCBUN, POCHEMO, POCHCT in the last 72 hours. Coags:   Lab Results   Component Value Date    PROTIME 13.6 02/09/2022    INR 1.2 02/09/2022    APTT 29.2 02/09/2022       HCG (If Applicable):   Lab Results   Component Value Date    PREGTESTUR neg 03/27/2013        ABGs: No results found for: PHART, PO2ART, GUM4MJV, ZQJ2QGL, BEART, Z4DYTFDU     Type & Screen (If Applicable):  No results found for: LABABO, LABRH    Drug/Infectious Status (If Applicable):  No results found for: HIV, HEPCAB    COVID-19 Screening (If Applicable):   Lab Results   Component Value Date    COVID19 Not Detected 02/11/2022     ECG 2/9/22  Narrative & Impression    Normal sinus rhythm with sinus arrhythmia  Minimal voltage criteria for LVH, may be normal variant  Nonspecific T wave abnormality  Abnormal ECG  No previous ECGs available  Confirmed by Eric Chery ([id number]) on 2/10/2022 12:00:09 PM           Anesthesia Evaluation  Patient summary reviewed and Nursing notes reviewed no history of anesthetic complications:   Airway: Mallampati: II  TM distance: >3 FB   Neck ROM: full  Mouth opening: < 3 FB Dental:          Pulmonary:normal exam  breath sounds clear to auscultation  (+) current smoker (former)          Patient did not smoke on day of surgery.                  Cardiovascular:    (+) hypertension:, hyperlipidemia      ECG reviewed  Rhythm: regular  Rate: normal           Beta Blocker:  Not on Beta Blocker         Neuro/Psych:   (+) CVA: residual symptoms, psychiatric history: stable with treatmentdepression/anxiety             GI/Hepatic/Renal:   (+) GERD: well controlled,           Endo/Other:    (+) no malignancy/cancer. (-) no malignancy/cancer               Abdominal:             Vascular: negative vascular ROS. Other Findings:             Anesthesia Plan      MAC     ASA 3     (IV right AC)  Induction: intravenous. Anesthetic plan and risks discussed with patient. Use of blood products discussed with patient whom consented to blood products. Plan discussed with CRNA and attending. Meghan Hensley RN   4/12/2022     I agree with the above pre-anesthetic assessment and anesthesia plan.   IKE Guerrero - CRNA

## 2022-04-12 NOTE — BRIEF OP NOTE
Brief Postoperative Note      Patient: Nay Del Cid  YOB: 1970  MRN: [id number]    Date of Procedure: 4/12/2022    Pre-Op Diagnosis: BILATERAL AND URETERAL STONE    Post-Op Diagnosis: Same       Procedure(s):  CYSTOSCOPY RETROGRADE PYELOGRAM,  URETEROSCOPY,  LASER LITHOTRIPSY - BILATERAL, WITH BILATERAL STENT EXCHANGE    Surgeon(s):  Meaghan Thakkar MD    Assistant:  * No surgical staff found *    Anesthesia: Monitor Anesthesia Care    Estimated Blood Loss (mL): Minimal    Complications: None    Specimens:   * No specimens in log *    Implants:  Implant Name Type Inv.  Item Serial No.  Lot No. LRB No. Used Action   STENT URETERAL 7 FRX28 CM FIRM [employer]A - [id number]  STENT URETERAL 7 FRX28 CM FIRM [employer]OF[employer] TRIA  [employer]- [id number] Right 1 Implanted   STENT URET 7FR L28CM PERCFLX FIRM DUROMETER DBL PGTL [place] [employer] [id number]  STENT URET 7FR L28CM PERCFLX FIRM DUROMETER DBL PGTL TAPR  [employer]WD [id number] Left 1 Implanted         Drains: * No LDAs found *    Findings: Stones    Electronically signed by Meaghan Thakkar MD on 4/12/2022 at 8:41 AM

## 2022-04-12 NOTE — OP NOTE
[facility]rology [address]  Urology Post-operative Note    Kevin Zhang  YOB: 1970  [id number]    Pre-operative Diagnosis: Bilateral renal calculi    Post-operative Diagnosis:  Same    Procedure: Cystourethroscopy, bilateral flexible ureteroscopy with intracorporeal holmium laser lithotripsy, bilateral JJ ureteral stent exchange    Surgeon: Niru Craig MD    Anesthesia:   [facility]    Estimated Blood Loss:   Minimal    Complications: None    Drains: Bilateral 7 Barbadian by 28 cm JJ ureteral stents    Specimen(s): None    Clinical Note:   51-year-old female with recurrent nephrolithiasis. She underwent a stone debulking procedure endoscopically approximately month ago. She has residual stones and retained stents. She presents for the above listed procedure. The risks and benefits of the procedure including but not limited to infection, bleeding, bladder perforation or injury, possible inability to remove all stones requiring further procedures, possible need for stents with stent irritation etc. were discussed in detail and she elected to proceed    Operative Description:   She was taken to the operating room placed on the OR table in supine position. She received IV Ancef preoperatively. Following induction of anesthesia she was repositioned into the dorsolithotomy position. Her external genitalia and abdomen were prepped and draped sterilely. A  film KUB showed bilateral renal stones and retained ureteral stents. A 21 Barbadian cystoscope with 30 degree lens was inserted per urethra and in the bladder. There were no urethral strictures false passages or tumors. Panendoscopic evaluation of the bladder showed a poorly compliant bladder. There were no clots, stones, tumors. Both ureteral openings were in the usual position on the trigone and there was clear reflux of urine from each. There were stents coming out each side with minimal stone debris on them.   The right stent was grasped and brought up urethra. A sensor wire was passed up through this and into the right renal pelvis and the stent was removed and discarded. The wire was secured to the surgical drapes as a safety wire. A flexible lipase ureteroscope was advanced and over the wire and up into the right renal pelvis. The ureter was devoid of any stone strictures tumors or false passages. Inspection of the renal pelvis showed a large 1 and half centimeter stone in the central renal pelvis. No other stones were identified. A 200 µm holmium laser fiber was deployed. The stone was fragmented and dusted into multiple pieces of small particulate matter all felt to be passable. There is minimal renal trauma. Inspection showed no other residual stone debris of any concern. The wire was passed through the scope and into the right renal pelvis and the ureteroscope was removed. A 7 Western Erika by 28 cm JJ ureteral stent was passed over the wire and into the right renal pelvis. The wire was removed. Fluoroscopy confirmed coiling of the stent proximally in the renal pelvis and distally in the bladder. The cystoscope was reintroduced per urethra into the bladder and the left stent was grasped and brought out per urethra. This too was exchanged over wire and discarded. The wire was passed into the left renal pelvis. The flexible Olympus ureteroscope was passed over this wire and easily up into the left renal pelvis as well. High-pressure saline irrigation was again utilized. The left ureter was devoid of any stone strictures tumors or false passages. In the left kidney, there was 2 stones each measuring approximately 1 cm in size. Laser fiber was again passed through the scope and the stones were dusted and fragmented into small pieces of particulate matter until they were all felt to be passable. There was again minimal renal trauma.   The wire was exchanged the ureteroscope and into the left renal pelvis and the ureteroscope was removed. The ureter was again intact without any stone debris or perforations. A 7 Western Erika by 28 cm JJ ureteral stent was passed over this wire and passed into the left renal pelvis. Again the wire was removed. Fluoroscopy now showed both stents coiled in the proximal kidneys and within the distal bladder in good position. The strings of both stents were brought out per urethra and secured to the patient's medial right thigh with benzoin and tape. The bladder was emptied with the cystoscope sheath. She tolerated the procedure well and was taken to the PACU in stable condition. She was discharged home with prescriptions for Pyridium, Keflex, ibuprofen. Her stents will be removed this Monday morning at the facility. She will follow up in 3 to 4 weeks with a KUB and metabolic testing.       Felicita Rosales MD  4/12/2022  8:49 AM

## 2022-04-12 NOTE — ANESTHESIA POSTPROCEDURE EVALUATION
Department of Anesthesiology  Postprocedure Note    Patient: Abby Sampson  MRN: [id number]  YOB: 1970  Date of evaluation: 4/12/2022  Time:  10:22 AM     Procedure Summary     Date: 04/12/22 Room / Location: [facility] OR 11 / [facility]    Anesthesia Start: 8318 Anesthesia Stop: 9498    Procedure: [address],  URETEROSCOPY,  LASER LITHOTRIPSY - BILATERAL, WITH BILATERAL STENT EXCHANGE (Bilateral Pelvis) Diagnosis: (BILATERAL AND URETERAL STONE)    Surgeons: Driss Ball MD Responsible Provider: Alexus Turcios MD    Anesthesia Type: MAC ASA Status: 3          Anesthesia Type: MAC    Radha Phase I: Radha Score: 10    Radha Phase II:      Last vitals: Reviewed and per EMR flowsheets.        Anesthesia Post Evaluation    Patient location during evaluation: PACU  Patient participation: complete - patient participated  Level of consciousness: awake and alert  Airway patency: patent  Nausea & Vomiting: no nausea and no vomiting  Complications: no  Cardiovascular status: hemodynamically stable  Respiratory status: acceptable  Hydration status: euvolemic

## 2022-09-14 ENCOUNTER — OUTSIDE SERVICES (OUTPATIENT)
Dept: PRIMARY CARE CLINIC | Age: 52
End: 2022-09-14
Payer: COMMERCIAL

## 2022-09-14 DIAGNOSIS — F33.9 RECURRENT MAJOR DEPRESSIVE DISORDER, REMISSION STATUS UNSPECIFIED (HCC): ICD-10-CM

## 2022-09-14 DIAGNOSIS — I10 PRIMARY HYPERTENSION: ICD-10-CM

## 2022-09-14 DIAGNOSIS — I69.391 DYSPHAGIA FOLLOWING CEREBRAL INFARCTION: Primary | ICD-10-CM

## 2022-09-14 DIAGNOSIS — I69.354 HEMIPLEGIA AND HEMIPARESIS FOLLOWING CEREBRAL INFARCTION AFFECTING LEFT NON-DOMINANT SIDE (HCC): ICD-10-CM

## 2022-09-14 DIAGNOSIS — I63.50 CEREBRAL ARTERY OCCLUSION WITH CEREBRAL INFARCTION (HCC): ICD-10-CM

## 2022-09-14 PROBLEM — D64.9 ANEMIA, UNSPECIFIED: Status: ACTIVE | Noted: 2022-09-14

## 2022-09-14 PROCEDURE — 99307 SBSQ NF CARE SF MDM 10: CPT | Performed by: INTERNAL MEDICINE

## 2022-10-19 ENCOUNTER — OUTSIDE SERVICES (OUTPATIENT)
Dept: PRIMARY CARE CLINIC | Age: 52
End: 2022-10-19
Payer: COMMERCIAL

## 2022-10-19 DIAGNOSIS — I69.354 HEMIPLEGIA AND HEMIPARESIS FOLLOWING CEREBRAL INFARCTION AFFECTING LEFT NON-DOMINANT SIDE (HCC): Primary | ICD-10-CM

## 2022-10-19 DIAGNOSIS — I10 PRIMARY HYPERTENSION: ICD-10-CM

## 2022-10-19 DIAGNOSIS — R40.4 TRANSIENT ALTERATION OF AWARENESS: ICD-10-CM

## 2022-10-19 PROCEDURE — 99307 SBSQ NF CARE SF MDM 10: CPT | Performed by: INTERNAL MEDICINE

## 2022-11-09 ENCOUNTER — OUTSIDE SERVICES (OUTPATIENT)
Dept: PRIMARY CARE CLINIC | Age: 52
End: 2022-11-09
Payer: COMMERCIAL

## 2022-11-09 DIAGNOSIS — I10 PRIMARY HYPERTENSION: ICD-10-CM

## 2022-11-09 DIAGNOSIS — F33.9 RECURRENT MAJOR DEPRESSIVE DISORDER, REMISSION STATUS UNSPECIFIED (HCC): ICD-10-CM

## 2022-11-09 DIAGNOSIS — R40.4 TRANSIENT ALTERATION OF AWARENESS: Primary | ICD-10-CM

## 2022-11-09 DIAGNOSIS — I69.354 HEMIPLEGIA AND HEMIPARESIS FOLLOWING CEREBRAL INFARCTION AFFECTING LEFT NON-DOMINANT SIDE (HCC): ICD-10-CM

## 2022-11-09 PROCEDURE — 99307 SBSQ NF CARE SF MDM 10: CPT | Performed by: INTERNAL MEDICINE

## 2022-12-14 ENCOUNTER — OUTSIDE SERVICES (OUTPATIENT)
Dept: PRIMARY CARE CLINIC | Age: 52
End: 2022-12-14

## 2022-12-14 DIAGNOSIS — I69.391 DYSPHAGIA FOLLOWING CEREBRAL INFARCTION: ICD-10-CM

## 2022-12-14 DIAGNOSIS — R40.4 TRANSIENT ALTERATION OF AWARENESS: ICD-10-CM

## 2022-12-14 DIAGNOSIS — I69.354 HEMIPLEGIA AND HEMIPARESIS FOLLOWING CEREBRAL INFARCTION AFFECTING LEFT NON-DOMINANT SIDE (HCC): Primary | ICD-10-CM

## 2022-12-14 DIAGNOSIS — I10 PRIMARY HYPERTENSION: ICD-10-CM

## 2023-01-18 ENCOUNTER — OUTSIDE SERVICES (OUTPATIENT)
Dept: PRIMARY CARE CLINIC | Age: 53
End: 2023-01-18

## 2023-01-18 DIAGNOSIS — I10 PRIMARY HYPERTENSION: ICD-10-CM

## 2023-01-18 DIAGNOSIS — I69.354 HEMIPLEGIA AND HEMIPARESIS FOLLOWING CEREBRAL INFARCTION AFFECTING LEFT NON-DOMINANT SIDE (HCC): ICD-10-CM

## 2023-01-18 DIAGNOSIS — F33.9 RECURRENT MAJOR DEPRESSIVE DISORDER, REMISSION STATUS UNSPECIFIED (HCC): ICD-10-CM

## 2023-01-18 DIAGNOSIS — R40.4 TRANSIENT ALTERATION OF AWARENESS: Primary | ICD-10-CM

## 2023-02-15 ENCOUNTER — OUTSIDE SERVICES (OUTPATIENT)
Dept: PRIMARY CARE CLINIC | Age: 53
End: 2023-02-15

## 2023-02-15 DIAGNOSIS — I63.50 CEREBRAL ARTERY OCCLUSION WITH CEREBRAL INFARCTION (HCC): Primary | ICD-10-CM

## 2023-02-15 DIAGNOSIS — I69.391 DYSPHAGIA FOLLOWING CEREBRAL INFARCTION: ICD-10-CM

## 2023-02-15 DIAGNOSIS — F33.9 RECURRENT MAJOR DEPRESSIVE DISORDER, REMISSION STATUS UNSPECIFIED (HCC): ICD-10-CM

## 2023-02-15 DIAGNOSIS — I69.354 HEMIPLEGIA AND HEMIPARESIS FOLLOWING CEREBRAL INFARCTION AFFECTING LEFT NON-DOMINANT SIDE (HCC): ICD-10-CM

## 2023-03-15 ENCOUNTER — OUTSIDE SERVICES (OUTPATIENT)
Dept: PRIMARY CARE CLINIC | Age: 53
End: 2023-03-15

## 2023-03-15 DIAGNOSIS — F33.9 RECURRENT MAJOR DEPRESSIVE DISORDER, REMISSION STATUS UNSPECIFIED (HCC): ICD-10-CM

## 2023-03-15 DIAGNOSIS — I69.354 HEMIPLEGIA AND HEMIPARESIS FOLLOWING CEREBRAL INFARCTION AFFECTING LEFT NON-DOMINANT SIDE (HCC): ICD-10-CM

## 2023-03-15 DIAGNOSIS — I69.391 DYSPHAGIA FOLLOWING CEREBRAL INFARCTION: ICD-10-CM

## 2023-03-15 DIAGNOSIS — I63.50 CEREBRAL ARTERY OCCLUSION WITH CEREBRAL INFARCTION (HCC): Primary | ICD-10-CM

## 2023-04-18 ENCOUNTER — OUTSIDE SERVICES (OUTPATIENT)
Dept: PRIMARY CARE CLINIC | Age: 53
End: 2023-04-18

## 2023-04-18 DIAGNOSIS — I10 PRIMARY HYPERTENSION: ICD-10-CM

## 2023-04-18 DIAGNOSIS — I69.354 HEMIPLEGIA AND HEMIPARESIS FOLLOWING CEREBRAL INFARCTION AFFECTING LEFT NON-DOMINANT SIDE (HCC): ICD-10-CM

## 2023-04-18 DIAGNOSIS — I63.50 CEREBRAL ARTERY OCCLUSION WITH CEREBRAL INFARCTION (HCC): Primary | ICD-10-CM

## 2023-04-18 DIAGNOSIS — R40.4 TRANSIENT ALTERATION OF AWARENESS: ICD-10-CM

## 2023-04-18 NOTE — PROGRESS NOTES
Meli Banks is a 48 y.o. female with the following history of CVA,left hemiplegia seen at [facility] no complaint on ATB for infected tooth     Past Medical History:   Diagnosis Date    Altered mental status, unspecified     Anemia, iron deficiency     Anemia, unspecified     Cerebral artery occlusion with cerebral infarction [facility])     Dysphagia following cerebral infarction     Gastroesophageal reflux disease     without esophagitis    Hemiplegia and hemiparesis following cerebral infarction affecting left non-dominant side (HCC)     Hypertension     Intestinal malabsorption, unspecified     Major depressive disorder, recurrent ([facility] [address]     Major depressive disorder, recurrent ([facility] [address] 9/14/2022    Panic attacks        Past Surgical History:   Procedure Laterality Date    BLADDER SURGERY Bilateral 2/11/2022    CYSTOSCOPY RETROGRADE PYELOGRAM BILATERAL  STENT INSERTION performed by Marcus Eugene DO at [address] Bilateral 4/12/2022    CYSTOSCOPY RETROGRADE PYELOGRAM,  URETEROSCOPY,  LASER LITHOTRIPSY - BILATERAL, WITH BILATERAL STENT EXCHANGE performed by Kj Tamez MD at [facility], COLON, DIAGNOSTIC      FOOT SURGERY      right toes    FOOT SURGERY Left 3/27/13    correction 4th, 5th toes left foot    LAPAROTOMY      LITHOTRIPSY Bilateral 3/8/2022    CYSTOSCOPY RETROGRADE PYELOGRAM URETEROSCOPY, LASER LITHOTRIPSY performed by Kj Tamez MD at [facility]  05/20/2014       No family history on file.           Current Outpatient Medications:     ibuprofen (ADVIL;MOTRIN) 600 MG tablet, Take 1 tablet by mouth every 8 hours as needed for Pain, Disp: 20 tablet, Rfl: 0    cephALEXin (KEFLEX) 250 MG capsule, Take 1 capsule by mouth 3 times daily, Disp: 12 capsule, Rfl: 0    phenazopyridine (PYRIDIUM) 100 MG tablet, Take 100 mg by mouth 3 times daily as needed for Pain, Disp: , Rfl:     mirtazapine (REMERON) 15 MG tablet, Take 15 mg

## 2023-05-09 ENCOUNTER — OUTSIDE SERVICES (OUTPATIENT)
Dept: PRIMARY CARE CLINIC | Age: 53
End: 2023-05-09

## 2023-05-09 DIAGNOSIS — I69.354 HEMIPLEGIA AND HEMIPARESIS FOLLOWING CEREBRAL INFARCTION AFFECTING LEFT NON-DOMINANT SIDE (HCC): ICD-10-CM

## 2023-05-09 DIAGNOSIS — I10 PRIMARY HYPERTENSION: ICD-10-CM

## 2023-05-09 DIAGNOSIS — I63.50 CEREBRAL ARTERY OCCLUSION WITH CEREBRAL INFARCTION (HCC): Primary | ICD-10-CM

## 2023-05-09 NOTE — PROGRESS NOTES
Admit date: 2/12/22  Date of Service:  5/9/23      Hector Burns  1970      HPI   She is a 48 y.o. female resident who is being seen today for follow up feels fine no com[plaint .         Review of Systems  Respiratory: negative for cough and hemoptysis  Cardiovascular: negative for chest pain and dyspnea  Gastrointestinal: negative for abdominal pain, diarrhea, nausea and vomiting  Genitourinary:negative for dysuria and hematuria  Derm: negative for rash and skin lesion(s)  Neurological: negative for seizures and tremors  Endocrine: negative for diabetic symptoms including polydipsia and polyuria  Current Outpatient Medications   Medication Sig Dispense Refill    ibuprofen (ADVIL;MOTRIN) 600 MG tablet Take 1 tablet by mouth every 8 hours as needed for Pain 20 tablet 0    cephALEXin (KEFLEX) 250 MG capsule Take 1 capsule by mouth 3 times daily 12 capsule 0    phenazopyridine (PYRIDIUM) 100 MG tablet Take 100 mg by mouth 3 times daily as needed for Pain      mirtazapine (REMERON) 15 MG tablet Take 15 mg by mouth daily Indications: appetite stimulant      Cholecalciferol (VITAMIN D) 50 MCG (2000 UT) CAPS capsule Take by mouth daily      ibuprofen (ADVIL;MOTRIN) 600 MG tablet Take 1 tablet by mouth every 8 hours as needed for Pain 20 tablet 1    magnesium hydroxide (MILK OF MAGNESIA CONCENTRATE) 2400 MG/10ML SUSP Take 2,400 mg by mouth once as needed      docusate sodium (COLACE) 100 MG capsule Take 1 capsule by mouth 2 times daily as needed for Constipation 60 capsule 1    acetaminophen (TYLENOL) 325 MG tablet Take 650 mg by mouth every 4 hours as needed for Pain or Fever       vitamin C (ASCORBIC ACID) 500 MG tablet Take 500 mg by mouth 2 times daily      calcium carbonate (TUMS) 500 MG chewable tablet Take 2 tablets by mouth daily      bisacodyl (DULCOLAX) 10 MG suppository Place 10 mg rectally daily as needed for Constipation      ferrous sulfate (IRON 325) 325 (65 Fe) MG tablet Take 325 mg by mouth daily

## 2023-06-20 ENCOUNTER — OUTSIDE SERVICES (OUTPATIENT)
Dept: PRIMARY CARE CLINIC | Age: 53
End: 2023-06-20

## 2023-06-20 DIAGNOSIS — I63.50 CEREBRAL ARTERY OCCLUSION WITH CEREBRAL INFARCTION (HCC): ICD-10-CM

## 2023-06-20 DIAGNOSIS — F33.9 RECURRENT MAJOR DEPRESSIVE DISORDER, REMISSION STATUS UNSPECIFIED (HCC): ICD-10-CM

## 2023-06-20 DIAGNOSIS — I10 PRIMARY HYPERTENSION: ICD-10-CM

## 2023-06-20 DIAGNOSIS — I69.354 HEMIPLEGIA AND HEMIPARESIS FOLLOWING CEREBRAL INFARCTION AFFECTING LEFT NON-DOMINANT SIDE (HCC): Primary | ICD-10-CM

## 2023-06-20 NOTE — PROGRESS NOTES
Admit date: 2/12/22  Date of Service:  6/20/23      Rosemary Morrison  1970      HPI   She is a 48 y.o. female resident who is being seen today for follow up doing fine no complaint       Review of Systems  Respiratory: negative for cough and hemoptysis  Cardiovascular: negative for chest pain and dyspnea  Gastrointestinal: negative for abdominal pain, diarrhea, nausea and vomiting  Genitourinary:negative for dysuria and hematuria  Derm: negative for rash and skin lesion(s)  Neurological: negative for seizures and tremors  Endocrine: negative for diabetic symptoms including polydipsia and polyuria  Current Outpatient Medications   Medication Sig Dispense Refill    ibuprofen (ADVIL;MOTRIN) 600 MG tablet Take 1 tablet by mouth every 8 hours as needed for Pain 20 tablet 0    cephALEXin (KEFLEX) 250 MG capsule Take 1 capsule by mouth 3 times daily 12 capsule 0    phenazopyridine (PYRIDIUM) 100 MG tablet Take 100 mg by mouth 3 times daily as needed for Pain      mirtazapine (REMERON) 15 MG tablet Take 15 mg by mouth daily Indications: appetite stimulant      Cholecalciferol (VITAMIN D) 50 MCG (2000 UT) CAPS capsule Take by mouth daily      ibuprofen (ADVIL;MOTRIN) 600 MG tablet Take 1 tablet by mouth every 8 hours as needed for Pain 20 tablet 1    magnesium hydroxide (MILK OF MAGNESIA CONCENTRATE) 2400 MG/10ML SUSP Take 2,400 mg by mouth once as needed      docusate sodium (COLACE) 100 MG capsule Take 1 capsule by mouth 2 times daily as needed for Constipation 60 capsule 1    acetaminophen (TYLENOL) 325 MG tablet Take 650 mg by mouth every 4 hours as needed for Pain or Fever       vitamin C (ASCORBIC ACID) 500 MG tablet Take 500 mg by mouth 2 times daily      calcium carbonate (TUMS) 500 MG chewable tablet Take 2 tablets by mouth daily      bisacodyl (DULCOLAX) 10 MG suppository Place 10 mg rectally daily as needed for Constipation      ferrous sulfate (IRON 325) 325 (65 Fe) MG tablet Take 325 mg by mouth daily

## 2025-02-13 ENCOUNTER — APPOINTMENT (OUTPATIENT)
Dept: GENERAL RADIOLOGY | Age: 55
DRG: 871 | End: 2025-02-13
Payer: COMMERCIAL

## 2025-02-13 ENCOUNTER — HOSPITAL ENCOUNTER (INPATIENT)
Age: 55
LOS: 6 days | Discharge: NURSING, REHAB OR LONG-TERM CARE | DRG: 871 | End: 2025-02-19
Attending: STUDENT IN AN ORGANIZED HEALTH CARE EDUCATION/TRAINING PROGRAM | Admitting: FAMILY MEDICINE
Payer: COMMERCIAL

## 2025-02-13 ENCOUNTER — APPOINTMENT (OUTPATIENT)
Dept: ULTRASOUND IMAGING | Age: 55
DRG: 871 | End: 2025-02-13
Payer: COMMERCIAL

## 2025-02-13 ENCOUNTER — APPOINTMENT (OUTPATIENT)
Dept: CT IMAGING | Age: 55
DRG: 871 | End: 2025-02-13
Payer: COMMERCIAL

## 2025-02-13 DIAGNOSIS — N39.0 SEPSIS DUE TO URINARY TRACT INFECTION (HCC): ICD-10-CM

## 2025-02-13 DIAGNOSIS — R31.9 URINARY TRACT INFECTION WITH HEMATURIA, SITE UNSPECIFIED: ICD-10-CM

## 2025-02-13 DIAGNOSIS — N20.0 KIDNEY STONE: ICD-10-CM

## 2025-02-13 DIAGNOSIS — N39.0 URINARY TRACT INFECTION WITH HEMATURIA, SITE UNSPECIFIED: ICD-10-CM

## 2025-02-13 DIAGNOSIS — R60.0 EDEMA OF RIGHT UPPER ARM: ICD-10-CM

## 2025-02-13 DIAGNOSIS — N13.30 HYDRONEPHROSIS, UNSPECIFIED HYDRONEPHROSIS TYPE: ICD-10-CM

## 2025-02-13 DIAGNOSIS — N17.9 AKI (ACUTE KIDNEY INJURY): ICD-10-CM

## 2025-02-13 DIAGNOSIS — N21.0 BLADDER STONE: ICD-10-CM

## 2025-02-13 DIAGNOSIS — N20.0 STAGHORN KIDNEY STONES: ICD-10-CM

## 2025-02-13 DIAGNOSIS — A41.9 SEPTICEMIA (HCC): Primary | ICD-10-CM

## 2025-02-13 DIAGNOSIS — A41.9 SEPSIS DUE TO URINARY TRACT INFECTION (HCC): ICD-10-CM

## 2025-02-13 DIAGNOSIS — R60.0 EDEMA OF RIGHT UPPER EXTREMITY: ICD-10-CM

## 2025-02-13 DIAGNOSIS — K59.00 CONSTIPATION, UNSPECIFIED CONSTIPATION TYPE: ICD-10-CM

## 2025-02-13 LAB
ALBUMIN SERPL-MCNC: 2.4 G/DL (ref 3.5–5.2)
ALP SERPL-CCNC: 285 U/L (ref 35–104)
ALT SERPL-CCNC: 40 U/L (ref 0–32)
ANION GAP SERPL CALCULATED.3IONS-SCNC: 14 MMOL/L (ref 7–16)
ANION GAP SERPL CALCULATED.3IONS-SCNC: 14 MMOL/L (ref 7–16)
AST SERPL-CCNC: 72 U/L (ref 0–31)
B.E.: -9.2 MMOL/L (ref -3–3)
BACTERIA URNS QL MICRO: ABNORMAL
BASOPHILS # BLD: 0.03 K/UL (ref 0–0.2)
BASOPHILS NFR BLD: 0 % (ref 0–2)
BILIRUB SERPL-MCNC: 0.3 MG/DL (ref 0–1.2)
BILIRUB UR QL STRIP: NEGATIVE
BUN SERPL-MCNC: 37 MG/DL (ref 6–20)
BUN SERPL-MCNC: 43 MG/DL (ref 6–20)
CALCIUM SERPL-MCNC: 7.1 MG/DL (ref 8.6–10.2)
CALCIUM SERPL-MCNC: 7.9 MG/DL (ref 8.6–10.2)
CHLORIDE SERPL-SCNC: 106 MMOL/L (ref 98–107)
CHLORIDE SERPL-SCNC: 107 MMOL/L (ref 98–107)
CLARITY UR: ABNORMAL
CO2 SERPL-SCNC: 13 MMOL/L (ref 22–29)
CO2 SERPL-SCNC: 15 MMOL/L (ref 22–29)
COHB: 0.1 % (ref 0–1.5)
COLOR UR: YELLOW
CREAT SERPL-MCNC: 0.9 MG/DL (ref 0.5–1)
CREAT SERPL-MCNC: 1.1 MG/DL (ref 0.5–1)
CRITICAL: ABNORMAL
DATE ANALYZED: ABNORMAL
DATE OF COLLECTION: ABNORMAL
EKG ATRIAL RATE: 100 BPM
EKG Q-T INTERVAL: 500 MS
EKG QRS DURATION: 82 MS
EKG QTC CALCULATION (BAZETT): 670 MS
EKG R AXIS: -16 DEGREES
EKG T AXIS: 61 DEGREES
EKG VENTRICULAR RATE: 108 BPM
EOSINOPHIL # BLD: 0.15 K/UL (ref 0.05–0.5)
EOSINOPHILS RELATIVE PERCENT: 1 % (ref 0–6)
EPI CELLS #/AREA URNS HPF: ABNORMAL /HPF
ERYTHROCYTE [DISTWIDTH] IN BLOOD BY AUTOMATED COUNT: 15.8 % (ref 11.5–15)
FLUAV RNA RESP QL NAA+PROBE: NOT DETECTED
FLUBV RNA RESP QL NAA+PROBE: NOT DETECTED
GFR, ESTIMATED: 63 ML/MIN/1.73M2
GFR, ESTIMATED: 75 ML/MIN/1.73M2
GLUCOSE BLD-MCNC: 106 MG/DL
GLUCOSE BLD-MCNC: 106 MG/DL (ref 74–99)
GLUCOSE SERPL-MCNC: 104 MG/DL (ref 74–99)
GLUCOSE SERPL-MCNC: 91 MG/DL (ref 74–99)
GLUCOSE UR STRIP-MCNC: NEGATIVE MG/DL
HCO3: 13.3 MMOL/L (ref 22–26)
HCT VFR BLD AUTO: 41 % (ref 34–48)
HGB BLD-MCNC: 13.6 G/DL (ref 11.5–15.5)
HGB UR QL STRIP.AUTO: ABNORMAL
HHB: 2 % (ref 0–5)
IMM GRANULOCYTES # BLD AUTO: 0.21 K/UL (ref 0–0.58)
IMM GRANULOCYTES NFR BLD: 2 % (ref 0–5)
KETONES UR STRIP-MCNC: NEGATIVE MG/DL
LAB: ABNORMAL
LACTATE BLDV-SCNC: 3.6 MMOL/L (ref 0.5–2.2)
LEUKOCYTE ESTERASE UR QL STRIP: ABNORMAL
LYMPHOCYTES NFR BLD: 2.39 K/UL (ref 1.5–4)
LYMPHOCYTES RELATIVE PERCENT: 19 % (ref 20–42)
Lab: 1828
MAGNESIUM SERPL-MCNC: 2.5 MG/DL (ref 1.6–2.6)
MCH RBC QN AUTO: 28.4 PG (ref 26–35)
MCHC RBC AUTO-ENTMCNC: 33.2 G/DL (ref 32–34.5)
MCV RBC AUTO: 85.6 FL (ref 80–99.9)
METHB: 0 % (ref 0–1.5)
MODE: ABNORMAL
MONOCYTES NFR BLD: 0.8 K/UL (ref 0.1–0.95)
MONOCYTES NFR BLD: 6 % (ref 2–12)
NEUTROPHILS NFR BLD: 71 % (ref 43–80)
NEUTS SEG NFR BLD: 8.93 K/UL (ref 1.8–7.3)
NITRITE UR QL STRIP: POSITIVE
O2 CONTENT: 16.9 ML/DL
O2 SATURATION: 98 % (ref 92–98.5)
O2HB: 97.9 % (ref 94–97)
OPERATOR ID: ABNORMAL
PATIENT TEMP: 37 C
PCO2: 21.1 MMHG (ref 35–45)
PH BLOOD GAS: 7.42 (ref 7.35–7.45)
PH UR STRIP: 5.5 [PH] (ref 5–8)
PLATELET # BLD AUTO: 445 K/UL (ref 130–450)
PMV BLD AUTO: 8.5 FL (ref 7–12)
PO2: 101.3 MMHG (ref 75–100)
POTASSIUM SERPL-SCNC: 3.3 MMOL/L (ref 3.5–5)
POTASSIUM SERPL-SCNC: 3.8 MMOL/L (ref 3.5–5)
PROT SERPL-MCNC: 5.4 G/DL (ref 6.4–8.3)
PROT UR STRIP-MCNC: ABNORMAL MG/DL
RBC # BLD AUTO: 4.79 M/UL (ref 3.5–5.5)
RBC #/AREA URNS HPF: ABNORMAL /HPF
SARS-COV-2 RNA RESP QL NAA+PROBE: NOT DETECTED
SODIUM SERPL-SCNC: 134 MMOL/L (ref 132–146)
SODIUM SERPL-SCNC: 135 MMOL/L (ref 132–146)
SOURCE, BLOOD GAS: ABNORMAL
SOURCE: NORMAL
SP GR UR STRIP: 1.02 (ref 1–1.03)
SPECIMEN DESCRIPTION: NORMAL
THB: 12.2 G/DL (ref 11.5–16.5)
TIME ANALYZED: 1835
TROPONIN I SERPL HS-MCNC: 70 NG/L (ref 0–9)
TROPONIN I SERPL HS-MCNC: 79 NG/L (ref 0–9)
UROBILINOGEN UR STRIP-ACNC: 0.2 EU/DL (ref 0–1)
WBC #/AREA URNS HPF: ABNORMAL /HPF
WBC OTHER # BLD: 12.5 K/UL (ref 4.5–11.5)

## 2025-02-13 PROCEDURE — 51798 US URINE CAPACITY MEASURE: CPT

## 2025-02-13 PROCEDURE — 6360000004 HC RX CONTRAST MEDICATION: Performed by: RADIOLOGY

## 2025-02-13 PROCEDURE — 93010 ELECTROCARDIOGRAM REPORT: CPT | Performed by: INTERNAL MEDICINE

## 2025-02-13 PROCEDURE — 83605 ASSAY OF LACTIC ACID: CPT

## 2025-02-13 PROCEDURE — 80053 COMPREHEN METABOLIC PANEL: CPT

## 2025-02-13 PROCEDURE — 6360000002 HC RX W HCPCS

## 2025-02-13 PROCEDURE — 87040 BLOOD CULTURE FOR BACTERIA: CPT

## 2025-02-13 PROCEDURE — 84145 PROCALCITONIN (PCT): CPT

## 2025-02-13 PROCEDURE — 80048 BASIC METABOLIC PNL TOTAL CA: CPT

## 2025-02-13 PROCEDURE — 99223 1ST HOSP IP/OBS HIGH 75: CPT | Performed by: FAMILY MEDICINE

## 2025-02-13 PROCEDURE — 87077 CULTURE AEROBIC IDENTIFY: CPT

## 2025-02-13 PROCEDURE — 82805 BLOOD GASES W/O2 SATURATION: CPT

## 2025-02-13 PROCEDURE — 2580000003 HC RX 258

## 2025-02-13 PROCEDURE — 84484 ASSAY OF TROPONIN QUANT: CPT

## 2025-02-13 PROCEDURE — 87636 SARSCOV2 & INF A&B AMP PRB: CPT

## 2025-02-13 PROCEDURE — 81001 URINALYSIS AUTO W/SCOPE: CPT

## 2025-02-13 PROCEDURE — 96365 THER/PROPH/DIAG IV INF INIT: CPT

## 2025-02-13 PROCEDURE — 87086 URINE CULTURE/COLONY COUNT: CPT

## 2025-02-13 PROCEDURE — 93005 ELECTROCARDIOGRAM TRACING: CPT

## 2025-02-13 PROCEDURE — 2060000000 HC ICU INTERMEDIATE R&B

## 2025-02-13 PROCEDURE — 82962 GLUCOSE BLOOD TEST: CPT

## 2025-02-13 PROCEDURE — 99285 EMERGENCY DEPT VISIT HI MDM: CPT

## 2025-02-13 PROCEDURE — 85025 COMPLETE CBC W/AUTO DIFF WBC: CPT

## 2025-02-13 PROCEDURE — 6370000000 HC RX 637 (ALT 250 FOR IP)

## 2025-02-13 PROCEDURE — 71045 X-RAY EXAM CHEST 1 VIEW: CPT

## 2025-02-13 PROCEDURE — 74177 CT ABD & PELVIS W/CONTRAST: CPT

## 2025-02-13 PROCEDURE — 76705 ECHO EXAM OF ABDOMEN: CPT

## 2025-02-13 PROCEDURE — 83735 ASSAY OF MAGNESIUM: CPT

## 2025-02-13 RX ORDER — ENOXAPARIN SODIUM 100 MG/ML
30 INJECTION SUBCUTANEOUS DAILY
Status: DISCONTINUED | OUTPATIENT
Start: 2025-02-14 | End: 2025-02-19 | Stop reason: HOSPADM

## 2025-02-13 RX ORDER — MAGNESIUM SULFATE IN WATER 40 MG/ML
2000 INJECTION, SOLUTION INTRAVENOUS ONCE
Status: COMPLETED | OUTPATIENT
Start: 2025-02-13 | End: 2025-02-13

## 2025-02-13 RX ORDER — ACETAMINOPHEN 650 MG/1
650 SUPPOSITORY RECTAL EVERY 6 HOURS PRN
Status: DISCONTINUED | OUTPATIENT
Start: 2025-02-13 | End: 2025-02-19 | Stop reason: HOSPADM

## 2025-02-13 RX ORDER — SODIUM CHLORIDE 0.9 % (FLUSH) 0.9 %
5-40 SYRINGE (ML) INJECTION EVERY 12 HOURS SCHEDULED
Status: DISCONTINUED | OUTPATIENT
Start: 2025-02-13 | End: 2025-02-19 | Stop reason: HOSPADM

## 2025-02-13 RX ORDER — SODIUM CHLORIDE 9 MG/ML
INJECTION, SOLUTION INTRAVENOUS CONTINUOUS
Status: DISCONTINUED | OUTPATIENT
Start: 2025-02-13 | End: 2025-02-14

## 2025-02-13 RX ORDER — IOPAMIDOL 755 MG/ML
75 INJECTION, SOLUTION INTRAVASCULAR
Status: COMPLETED | OUTPATIENT
Start: 2025-02-13 | End: 2025-02-13

## 2025-02-13 RX ORDER — ACETAMINOPHEN 325 MG/1
650 TABLET ORAL EVERY 6 HOURS PRN
Status: DISCONTINUED | OUTPATIENT
Start: 2025-02-13 | End: 2025-02-19 | Stop reason: HOSPADM

## 2025-02-13 RX ORDER — 0.9 % SODIUM CHLORIDE 0.9 %
30 INTRAVENOUS SOLUTION INTRAVENOUS ONCE
Status: COMPLETED | OUTPATIENT
Start: 2025-02-13 | End: 2025-02-13

## 2025-02-13 RX ORDER — 0.9 % SODIUM CHLORIDE 0.9 %
1000 INTRAVENOUS SOLUTION INTRAVENOUS ONCE
Status: COMPLETED | OUTPATIENT
Start: 2025-02-13 | End: 2025-02-13

## 2025-02-13 RX ORDER — SODIUM CHLORIDE 0.9 % (FLUSH) 0.9 %
5-40 SYRINGE (ML) INJECTION PRN
Status: DISCONTINUED | OUTPATIENT
Start: 2025-02-13 | End: 2025-02-19 | Stop reason: HOSPADM

## 2025-02-13 RX ADMIN — POTASSIUM BICARBONATE 25 MEQ: 978 TABLET, EFFERVESCENT ORAL at 20:11

## 2025-02-13 RX ADMIN — SODIUM CHLORIDE 1701 ML: 0.9 INJECTION, SOLUTION INTRAVENOUS at 15:45

## 2025-02-13 RX ADMIN — MAGNESIUM SULFATE IN WATER 2000 MG: 40 INJECTION, SOLUTION INTRAVENOUS at 15:50

## 2025-02-13 RX ADMIN — IOPAMIDOL 70 ML: 755 INJECTION, SOLUTION INTRAVENOUS at 20:48

## 2025-02-13 RX ADMIN — SODIUM CHLORIDE 1000 ML: 0.9 INJECTION, SOLUTION INTRAVENOUS at 20:12

## 2025-02-13 ASSESSMENT — LIFESTYLE VARIABLES
HOW MANY STANDARD DRINKS CONTAINING ALCOHOL DO YOU HAVE ON A TYPICAL DAY: PATIENT DOES NOT DRINK
HOW OFTEN DO YOU HAVE A DRINK CONTAINING ALCOHOL: NEVER

## 2025-02-13 NOTE — ED PROVIDER NOTES
mL/min/1.73m2    Calcium 7.9 (L) 8.6 - 10.2 mg/dL    Total Protein 5.4 (L) 6.4 - 8.3 g/dL    Albumin 2.4 (L) 3.5 - 5.2 g/dL    Total Bilirubin 0.3 0.0 - 1.2 mg/dL    Alkaline Phosphatase 285 (H) 35 - 104 U/L    ALT 40 (H) 0 - 32 U/L    AST 72 (H) 0 - 31 U/L   Troponin   Result Value Ref Range    Troponin, High Sensitivity 79 (H) 0 - 9 ng/L   Urinalysis with Microscopic   Result Value Ref Range    Color, UA Yellow Yellow    Turbidity UA Cloudy (A) Clear    Glucose, Ur NEGATIVE NEGATIVE mg/dL    Bilirubin, Urine NEGATIVE NEGATIVE    Ketones, Urine NEGATIVE NEGATIVE mg/dL    Specific Gravity, UA 1.020 1.005 - 1.030    Urine Hgb LARGE (A) NEGATIVE    pH, Urine 5.5 5.0 - 8.0    Protein, UA TRACE (A) NEGATIVE mg/dL    Urobilinogen, Urine 0.2 0.0 - 1.0 EU/dL    Nitrite, Urine POSITIVE (A) NEGATIVE    Leukocyte Esterase, Urine MODERATE (A) NEGATIVE    WBC, UA TOO NUMEROUS TO COUNT (A) 0 TO 5 /HPF    RBC, UA 6 TO 9 (A) 0 TO 2 /HPF    Epithelial Cells, UA 6 TO 9 /HPF    Bacteria, UA 1+ (A) None   Magnesium   Result Value Ref Range    Magnesium 2.5 1.6 - 2.6 mg/dL   Lactic Acid   Result Value Ref Range    Lactic Acid 3.6 (HH) 0.5 - 2.2 mmol/L   Troponin   Result Value Ref Range    Troponin, High Sensitivity 70 (H) 0 - 9 ng/L   BMP   Result Value Ref Range    Sodium 134 132 - 146 mmol/L    Potassium 3.3 (L) 3.5 - 5.0 mmol/L    Chloride 107 98 - 107 mmol/L    CO2 13 (L) 22 - 29 mmol/L    Anion Gap 14 7 - 16 mmol/L    Glucose 104 (H) 74 - 99 mg/dL    BUN 37 (H) 6 - 20 mg/dL    Creatinine 0.9 0.50 - 1.00 mg/dL    Est, Glom Filt Rate 75 >60 mL/min/1.73m2    Calcium 7.1 (L) 8.6 - 10.2 mg/dL   Blood Gas, Arterial   Result Value Ref Range    Date Analyzed 20250213     Time Analyzed 1835     Source: Blood Arterial     pH, Blood Gas 7.417 7.350 - 7.450    PCO2 21.1 (L) 35.0 - 45.0 mmHg    PO2 101.3 (H) 75.0 - 100.0 mmHg    HCO3 13.3 (L) 22.0 - 26.0 mmol/L    B.E. -9.2 (L) -3.0 - 3.0 mmol/L    O2 Sat 98.0 92.0 - 98.5 %    O2Hb 97.9 (H)  ---------------------------   The nursing notes within the ED encounter and vital signs as below have been reviewed by myself and ED attending.  /69   Pulse 98   Temp 97.5 °F (36.4 °C) (Oral)   Resp 18   Wt 56.7 kg (125 lb)   LMP  (LMP Unknown)   SpO2 99%   BMI 22.86 kg/m²   Oxygen Saturation Interpretation: Normal    The patient’s available past medical records and past encounters were reviewed by myself and ED attending    ------------------------------ ED COURSE/MEDICAL DECISION MAKING----------------------    Medical Decision Making/Differential Diagnosis:    CC/HPI Summary, Pertinent Physical Exam Findings, Social Determinants of health, Records Reviewed, DDx, testing done/not done, ED Course, Reassessment, disposition considerations/shared decision making with patient, consults, disposition:      Medical Decision Making/ED COURSE:    Chronic Conditions affecting care:    has a past medical history of Altered mental status, unspecified, Anemia, iron deficiency, Anemia, unspecified, Cerebral artery occlusion with cerebral infarction (HCC), Dysphagia following cerebral infarction, Gastroesophageal reflux disease, Hemiplegia and hemiparesis following cerebral infarction affecting left non-dominant side (HCC), Hypertension, Intestinal malabsorption, unspecified, Major depressive disorder, recurrent (HCC), Major depressive disorder, recurrent (HCC) (9/14/2022), and Panic attacks.     Myself and ED attending interpreted findings during patient's stay.   Vital signs /69   Pulse 98   Temp 97.5 °F (36.4 °C) (Oral)   Resp 18   Wt 56.7 kg (125 lb)   LMP  (LMP Unknown)   SpO2 99%   BMI 22.86 kg/m²     Differential Diagnosis includes but is not limited to sepsis, viral illness, pneumonia, UTI, electrolyte abnormalities, AMBROCIO, dehydration    The patient was placed on the cardiac monitor for continuous monitoring of rhythm and vitals. EKG ordered to evaluate patient's current cardiac rate, rhythm, and

## 2025-02-14 ENCOUNTER — ANESTHESIA (OUTPATIENT)
Dept: OPERATING ROOM | Age: 55
End: 2025-02-14
Payer: COMMERCIAL

## 2025-02-14 ENCOUNTER — APPOINTMENT (OUTPATIENT)
Dept: GENERAL RADIOLOGY | Age: 55
DRG: 871 | End: 2025-02-14
Payer: COMMERCIAL

## 2025-02-14 ENCOUNTER — ANESTHESIA EVENT (OUTPATIENT)
Dept: OPERATING ROOM | Age: 55
End: 2025-02-14
Payer: COMMERCIAL

## 2025-02-14 PROBLEM — K59.00 CONSTIPATION: Status: ACTIVE | Noted: 2025-02-14

## 2025-02-14 PROBLEM — E87.20 METABOLIC ACIDOSIS: Status: ACTIVE | Noted: 2025-02-14

## 2025-02-14 LAB
ALBUMIN SERPL-MCNC: 1.9 G/DL (ref 3.5–5.2)
ALP SERPL-CCNC: 241 U/L (ref 35–104)
ALT SERPL-CCNC: 39 U/L (ref 0–32)
ANION GAP SERPL CALCULATED.3IONS-SCNC: 10 MMOL/L (ref 7–16)
AST SERPL-CCNC: 79 U/L (ref 0–31)
BASOPHILS # BLD: 0.03 K/UL (ref 0–0.2)
BASOPHILS NFR BLD: 0 % (ref 0–2)
BILIRUB SERPL-MCNC: 0.3 MG/DL (ref 0–1.2)
BUN SERPL-MCNC: 25 MG/DL (ref 6–20)
CALCIUM SERPL-MCNC: 7 MG/DL (ref 8.6–10.2)
CHLORIDE SERPL-SCNC: 112 MMOL/L (ref 98–107)
CO2 SERPL-SCNC: 15 MMOL/L (ref 22–29)
CREAT SERPL-MCNC: 0.8 MG/DL (ref 0.5–1)
EOSINOPHIL # BLD: 0.17 K/UL (ref 0.05–0.5)
EOSINOPHILS RELATIVE PERCENT: 2 % (ref 0–6)
ERYTHROCYTE [DISTWIDTH] IN BLOOD BY AUTOMATED COUNT: 16 % (ref 11.5–15)
GFR, ESTIMATED: >90 ML/MIN/1.73M2
GLUCOSE SERPL-MCNC: 94 MG/DL (ref 74–99)
HCT VFR BLD AUTO: 36.5 % (ref 34–48)
HGB BLD-MCNC: 12 G/DL (ref 11.5–15.5)
IMM GRANULOCYTES # BLD AUTO: 0.18 K/UL (ref 0–0.58)
IMM GRANULOCYTES NFR BLD: 2 % (ref 0–5)
LACTATE BLDV-SCNC: 1.9 MMOL/L (ref 0.5–1.9)
LYMPHOCYTES NFR BLD: 1.59 K/UL (ref 1.5–4)
LYMPHOCYTES RELATIVE PERCENT: 14 % (ref 20–42)
MAGNESIUM SERPL-MCNC: 2.8 MG/DL (ref 1.6–2.6)
MCH RBC QN AUTO: 28.5 PG (ref 26–35)
MCHC RBC AUTO-ENTMCNC: 32.9 G/DL (ref 32–34.5)
MCV RBC AUTO: 86.7 FL (ref 80–99.9)
MONOCYTES NFR BLD: 1.03 K/UL (ref 0.1–0.95)
MONOCYTES NFR BLD: 9 % (ref 2–12)
NEUTROPHILS NFR BLD: 73 % (ref 43–80)
NEUTS SEG NFR BLD: 8.07 K/UL (ref 1.8–7.3)
PLATELET # BLD AUTO: 364 K/UL (ref 130–450)
PMV BLD AUTO: 8.3 FL (ref 7–12)
POTASSIUM SERPL-SCNC: 3.5 MMOL/L (ref 3.5–5)
PROCALCITONIN SERPL-MCNC: 99.41 NG/ML (ref 0–0.08)
PROT SERPL-MCNC: 4.7 G/DL (ref 6.4–8.3)
RBC # BLD AUTO: 4.21 M/UL (ref 3.5–5.5)
SODIUM SERPL-SCNC: 137 MMOL/L (ref 132–146)
WBC OTHER # BLD: 11.1 K/UL (ref 4.5–11.5)

## 2025-02-14 PROCEDURE — 6360000002 HC RX W HCPCS: Performed by: FAMILY MEDICINE

## 2025-02-14 PROCEDURE — 2580000003 HC RX 258: Performed by: FAMILY MEDICINE

## 2025-02-14 PROCEDURE — 88300 SURGICAL PATH GROSS: CPT

## 2025-02-14 PROCEDURE — 7100000010 HC PHASE II RECOVERY - FIRST 15 MIN: Performed by: STUDENT IN AN ORGANIZED HEALTH CARE EDUCATION/TRAINING PROGRAM

## 2025-02-14 PROCEDURE — 3700000000 HC ANESTHESIA ATTENDED CARE: Performed by: STUDENT IN AN ORGANIZED HEALTH CARE EDUCATION/TRAINING PROGRAM

## 2025-02-14 PROCEDURE — 7100000011 HC PHASE II RECOVERY - ADDTL 15 MIN: Performed by: STUDENT IN AN ORGANIZED HEALTH CARE EDUCATION/TRAINING PROGRAM

## 2025-02-14 PROCEDURE — 2500000003 HC RX 250 WO HCPCS

## 2025-02-14 PROCEDURE — 6360000002 HC RX W HCPCS: Performed by: NURSE ANESTHETIST, CERTIFIED REGISTERED

## 2025-02-14 PROCEDURE — 6360000004 HC RX CONTRAST MEDICATION: Performed by: STUDENT IN AN ORGANIZED HEALTH CARE EDUCATION/TRAINING PROGRAM

## 2025-02-14 PROCEDURE — C1769 GUIDE WIRE: HCPCS | Performed by: STUDENT IN AN ORGANIZED HEALTH CARE EDUCATION/TRAINING PROGRAM

## 2025-02-14 PROCEDURE — 83605 ASSAY OF LACTIC ACID: CPT

## 2025-02-14 PROCEDURE — BT1F1ZZ FLUOROSCOPY OF LEFT KIDNEY, URETER AND BLADDER USING LOW OSMOLAR CONTRAST: ICD-10-PCS | Performed by: STUDENT IN AN ORGANIZED HEALTH CARE EDUCATION/TRAINING PROGRAM

## 2025-02-14 PROCEDURE — 2500000003 HC RX 250 WO HCPCS: Performed by: INTERNAL MEDICINE

## 2025-02-14 PROCEDURE — 2500000003 HC RX 250 WO HCPCS: Performed by: FAMILY MEDICINE

## 2025-02-14 PROCEDURE — 6370000000 HC RX 637 (ALT 250 FOR IP): Performed by: FAMILY MEDICINE

## 2025-02-14 PROCEDURE — 97165 OT EVAL LOW COMPLEX 30 MIN: CPT

## 2025-02-14 PROCEDURE — 36415 COLL VENOUS BLD VENIPUNCTURE: CPT

## 2025-02-14 PROCEDURE — 6360000002 HC RX W HCPCS

## 2025-02-14 PROCEDURE — 2060000000 HC ICU INTERMEDIATE R&B

## 2025-02-14 PROCEDURE — 2709999900 HC NON-CHARGEABLE SUPPLY: Performed by: STUDENT IN AN ORGANIZED HEALTH CARE EDUCATION/TRAINING PROGRAM

## 2025-02-14 PROCEDURE — 85025 COMPLETE CBC W/AUTO DIFF WBC: CPT

## 2025-02-14 PROCEDURE — 3700000001 HC ADD 15 MINUTES (ANESTHESIA): Performed by: STUDENT IN AN ORGANIZED HEALTH CARE EDUCATION/TRAINING PROGRAM

## 2025-02-14 PROCEDURE — 83735 ASSAY OF MAGNESIUM: CPT

## 2025-02-14 PROCEDURE — 82365 CALCULUS SPECTROSCOPY: CPT

## 2025-02-14 PROCEDURE — 3600000003 HC SURGERY LEVEL 3 BASE: Performed by: STUDENT IN AN ORGANIZED HEALTH CARE EDUCATION/TRAINING PROGRAM

## 2025-02-14 PROCEDURE — 97535 SELF CARE MNGMENT TRAINING: CPT

## 2025-02-14 PROCEDURE — 97161 PT EVAL LOW COMPLEX 20 MIN: CPT | Performed by: PHYSICAL THERAPIST

## 2025-02-14 PROCEDURE — 99233 SBSQ HOSP IP/OBS HIGH 50: CPT | Performed by: INTERNAL MEDICINE

## 2025-02-14 PROCEDURE — 2580000003 HC RX 258: Performed by: INTERNAL MEDICINE

## 2025-02-14 PROCEDURE — 80053 COMPREHEN METABOLIC PANEL: CPT

## 2025-02-14 PROCEDURE — 3600000013 HC SURGERY LEVEL 3 ADDTL 15MIN: Performed by: STUDENT IN AN ORGANIZED HEALTH CARE EDUCATION/TRAINING PROGRAM

## 2025-02-14 PROCEDURE — C1758 CATHETER, URETERAL: HCPCS | Performed by: STUDENT IN AN ORGANIZED HEALTH CARE EDUCATION/TRAINING PROGRAM

## 2025-02-14 RX ORDER — LABETALOL HYDROCHLORIDE 5 MG/ML
10 INJECTION, SOLUTION INTRAVENOUS
Status: DISCONTINUED | OUTPATIENT
Start: 2025-02-14 | End: 2025-02-14 | Stop reason: HOSPADM

## 2025-02-14 RX ORDER — VITAMIN B COMPLEX
2000 TABLET ORAL DAILY
Status: DISCONTINUED | OUTPATIENT
Start: 2025-02-14 | End: 2025-02-19 | Stop reason: HOSPADM

## 2025-02-14 RX ORDER — LANOLIN ALCOHOL/MO/W.PET/CERES
400 CREAM (GRAM) TOPICAL DAILY
COMMUNITY

## 2025-02-14 RX ORDER — ASPIRIN 81 MG/1
81 TABLET, CHEWABLE ORAL DAILY
COMMUNITY

## 2025-02-14 RX ORDER — M-VIT,TX,IRON,MINS/CALC/FOLIC 27MG-0.4MG
1 TABLET ORAL DAILY
Status: DISCONTINUED | OUTPATIENT
Start: 2025-02-14 | End: 2025-02-19 | Stop reason: HOSPADM

## 2025-02-14 RX ORDER — CALCIUM CARBONATE 500 MG/1
2 TABLET, CHEWABLE ORAL DAILY
Status: DISCONTINUED | OUTPATIENT
Start: 2025-02-14 | End: 2025-02-19 | Stop reason: HOSPADM

## 2025-02-14 RX ORDER — SODIUM CHLORIDE 0.9 % (FLUSH) 0.9 %
5-40 SYRINGE (ML) INJECTION PRN
Status: DISCONTINUED | OUTPATIENT
Start: 2025-02-14 | End: 2025-02-14 | Stop reason: HOSPADM

## 2025-02-14 RX ORDER — FERROUS SULFATE 325(65) MG
325 TABLET ORAL
Status: DISCONTINUED | OUTPATIENT
Start: 2025-02-14 | End: 2025-02-19 | Stop reason: HOSPADM

## 2025-02-14 RX ORDER — ZINC SULFATE 50(220)MG
50 CAPSULE ORAL DAILY
Status: DISCONTINUED | OUTPATIENT
Start: 2025-02-14 | End: 2025-02-19 | Stop reason: HOSPADM

## 2025-02-14 RX ORDER — MEPERIDINE HYDROCHLORIDE 25 MG/ML
12.5 INJECTION INTRAMUSCULAR; INTRAVENOUS; SUBCUTANEOUS EVERY 5 MIN PRN
Status: DISCONTINUED | OUTPATIENT
Start: 2025-02-14 | End: 2025-02-14 | Stop reason: HOSPADM

## 2025-02-14 RX ORDER — FAMOTIDINE 20 MG/1
20 TABLET, FILM COATED ORAL DAILY
COMMUNITY

## 2025-02-14 RX ORDER — BISACODYL 10 MG
10 SUPPOSITORY, RECTAL RECTAL DAILY PRN
Status: DISCONTINUED | OUTPATIENT
Start: 2025-02-14 | End: 2025-02-19 | Stop reason: HOSPADM

## 2025-02-14 RX ORDER — KETOROLAC TROMETHAMINE 15 MG/ML
15 INJECTION, SOLUTION INTRAMUSCULAR; INTRAVENOUS
Status: DISCONTINUED | OUTPATIENT
Start: 2025-02-14 | End: 2025-02-14 | Stop reason: HOSPADM

## 2025-02-14 RX ORDER — FENTANYL CITRATE 50 UG/ML
INJECTION, SOLUTION INTRAMUSCULAR; INTRAVENOUS
Status: DISCONTINUED | OUTPATIENT
Start: 2025-02-14 | End: 2025-02-14 | Stop reason: SDUPTHER

## 2025-02-14 RX ORDER — IOPAMIDOL 510 MG/ML
INJECTION, SOLUTION INTRAVASCULAR PRN
Status: DISCONTINUED | OUTPATIENT
Start: 2025-02-14 | End: 2025-02-14 | Stop reason: ALTCHOICE

## 2025-02-14 RX ORDER — DOCUSATE SODIUM 100 MG/1
100 CAPSULE, LIQUID FILLED ORAL 2 TIMES DAILY
Status: DISCONTINUED | OUTPATIENT
Start: 2025-02-14 | End: 2025-02-19 | Stop reason: HOSPADM

## 2025-02-14 RX ORDER — NALOXONE HYDROCHLORIDE 0.4 MG/ML
INJECTION, SOLUTION INTRAMUSCULAR; INTRAVENOUS; SUBCUTANEOUS PRN
Status: DISCONTINUED | OUTPATIENT
Start: 2025-02-14 | End: 2025-02-14 | Stop reason: HOSPADM

## 2025-02-14 RX ORDER — SODIUM CHLORIDE 0.9 % (FLUSH) 0.9 %
5-40 SYRINGE (ML) INJECTION EVERY 12 HOURS SCHEDULED
Status: DISCONTINUED | OUTPATIENT
Start: 2025-02-14 | End: 2025-02-14 | Stop reason: HOSPADM

## 2025-02-14 RX ORDER — HYDROXYZINE HYDROCHLORIDE 25 MG/1
25 TABLET, FILM COATED ORAL 3 TIMES DAILY
COMMUNITY

## 2025-02-14 RX ORDER — SODIUM CHLORIDE 9 MG/ML
INJECTION, SOLUTION INTRAVENOUS PRN
Status: DISCONTINUED | OUTPATIENT
Start: 2025-02-14 | End: 2025-02-14 | Stop reason: HOSPADM

## 2025-02-14 RX ORDER — MECOBALAMIN 5000 MCG
5 TABLET,DISINTEGRATING ORAL NIGHTLY
Status: DISCONTINUED | OUTPATIENT
Start: 2025-02-14 | End: 2025-02-19 | Stop reason: HOSPADM

## 2025-02-14 RX ORDER — HYDRALAZINE HYDROCHLORIDE 20 MG/ML
10 INJECTION INTRAMUSCULAR; INTRAVENOUS
Status: DISCONTINUED | OUTPATIENT
Start: 2025-02-14 | End: 2025-02-14 | Stop reason: HOSPADM

## 2025-02-14 RX ORDER — DIPHENHYDRAMINE HYDROCHLORIDE 50 MG/ML
12.5 INJECTION INTRAMUSCULAR; INTRAVENOUS
Status: DISCONTINUED | OUTPATIENT
Start: 2025-02-14 | End: 2025-02-14 | Stop reason: HOSPADM

## 2025-02-14 RX ORDER — MEGESTROL ACETATE 20 MG/1
20 TABLET ORAL DAILY
COMMUNITY

## 2025-02-14 RX ORDER — DROPERIDOL 2.5 MG/ML
0.62 INJECTION, SOLUTION INTRAMUSCULAR; INTRAVENOUS
Status: DISCONTINUED | OUTPATIENT
Start: 2025-02-14 | End: 2025-02-14 | Stop reason: HOSPADM

## 2025-02-14 RX ORDER — ASCORBIC ACID 500 MG
500 TABLET ORAL 2 TIMES DAILY
Status: DISCONTINUED | OUTPATIENT
Start: 2025-02-14 | End: 2025-02-19 | Stop reason: HOSPADM

## 2025-02-14 RX ORDER — MIRTAZAPINE 15 MG/1
15 TABLET, FILM COATED ORAL NIGHTLY
Status: DISCONTINUED | OUTPATIENT
Start: 2025-02-14 | End: 2025-02-19 | Stop reason: HOSPADM

## 2025-02-14 RX ORDER — MIDAZOLAM HYDROCHLORIDE 1 MG/ML
2 INJECTION, SOLUTION INTRAMUSCULAR; INTRAVENOUS
Status: DISCONTINUED | OUTPATIENT
Start: 2025-02-14 | End: 2025-02-14 | Stop reason: HOSPADM

## 2025-02-14 RX ORDER — ATORVASTATIN CALCIUM 40 MG/1
40 TABLET, FILM COATED ORAL NIGHTLY
Status: DISCONTINUED | OUTPATIENT
Start: 2025-02-14 | End: 2025-02-19 | Stop reason: HOSPADM

## 2025-02-14 RX ORDER — POLYETHYLENE GLYCOL 3350 17 G/17G
17 POWDER, FOR SOLUTION ORAL DAILY
Status: DISCONTINUED | OUTPATIENT
Start: 2025-02-14 | End: 2025-02-19 | Stop reason: HOSPADM

## 2025-02-14 RX ORDER — PROPOFOL 10 MG/ML
INJECTION, EMULSION INTRAVENOUS
Status: DISCONTINUED | OUTPATIENT
Start: 2025-02-14 | End: 2025-02-14 | Stop reason: SDUPTHER

## 2025-02-14 RX ORDER — LISINOPRIL 2.5 MG/1
2.5 TABLET ORAL DAILY
COMMUNITY

## 2025-02-14 RX ORDER — IPRATROPIUM BROMIDE AND ALBUTEROL SULFATE 2.5; .5 MG/3ML; MG/3ML
1 SOLUTION RESPIRATORY (INHALATION)
Status: DISCONTINUED | OUTPATIENT
Start: 2025-02-14 | End: 2025-02-14 | Stop reason: HOSPADM

## 2025-02-14 RX ORDER — FENTANYL CITRATE 0.05 MG/ML
25 INJECTION, SOLUTION INTRAMUSCULAR; INTRAVENOUS EVERY 5 MIN PRN
Status: DISCONTINUED | OUTPATIENT
Start: 2025-02-14 | End: 2025-02-14 | Stop reason: HOSPADM

## 2025-02-14 RX ORDER — PROCHLORPERAZINE EDISYLATE 5 MG/ML
5 INJECTION INTRAMUSCULAR; INTRAVENOUS
Status: DISCONTINUED | OUTPATIENT
Start: 2025-02-14 | End: 2025-02-14 | Stop reason: HOSPADM

## 2025-02-14 RX ADMIN — DOCUSATE SODIUM 100 MG: 100 CAPSULE, LIQUID FILLED ORAL at 09:14

## 2025-02-14 RX ADMIN — SODIUM CHLORIDE, PRESERVATIVE FREE 10 ML: 5 INJECTION INTRAVENOUS at 21:33

## 2025-02-14 RX ADMIN — FENTANYL CITRATE 50 MCG: 50 INJECTION, SOLUTION INTRAMUSCULAR; INTRAVENOUS at 14:21

## 2025-02-14 RX ADMIN — Medication 1 TABLET: at 09:14

## 2025-02-14 RX ADMIN — PHENYLEPHRINE HYDROCHLORIDE 300 MCG: 10 INJECTION INTRAVENOUS at 14:33

## 2025-02-14 RX ADMIN — SODIUM CHLORIDE: 0.9 INJECTION, SOLUTION INTRAVENOUS at 02:27

## 2025-02-14 RX ADMIN — MIRTAZAPINE 15 MG: 15 TABLET, FILM COATED ORAL at 21:32

## 2025-02-14 RX ADMIN — SODIUM CHLORIDE, PRESERVATIVE FREE 10 ML: 5 INJECTION INTRAVENOUS at 09:16

## 2025-02-14 RX ADMIN — FERROUS SULFATE TAB 325 MG (65 MG ELEMENTAL FE) 325 MG: 325 (65 FE) TAB at 09:14

## 2025-02-14 RX ADMIN — ZINC SULFATE 220 MG (50 MG) CAPSULE 50 MG: CAPSULE at 09:14

## 2025-02-14 RX ADMIN — POLYETHYLENE GLYCOL 3350 17 G: 17 POWDER, FOR SOLUTION ORAL at 09:15

## 2025-02-14 RX ADMIN — Medication 5 MG: at 21:32

## 2025-02-14 RX ADMIN — PROPOFOL 100 MCG/KG/MIN: 10 INJECTION, EMULSION INTRAVENOUS at 14:25

## 2025-02-14 RX ADMIN — OXYCODONE HYDROCHLORIDE AND ACETAMINOPHEN 500 MG: 500 TABLET ORAL at 21:32

## 2025-02-14 RX ADMIN — PHENYLEPHRINE HYDROCHLORIDE 100 MCG: 10 INJECTION INTRAVENOUS at 14:27

## 2025-02-14 RX ADMIN — Medication 2000 UNITS: at 09:14

## 2025-02-14 RX ADMIN — SERTRALINE HYDROCHLORIDE 25 MG: 50 TABLET ORAL at 09:14

## 2025-02-14 RX ADMIN — FENTANYL CITRATE 50 MCG: 50 INJECTION, SOLUTION INTRAMUSCULAR; INTRAVENOUS at 14:16

## 2025-02-14 RX ADMIN — CALCIUM CARBONATE 1000 MG: 500 TABLET, CHEWABLE ORAL at 09:14

## 2025-02-14 RX ADMIN — OXYCODONE HYDROCHLORIDE AND ACETAMINOPHEN 500 MG: 500 TABLET ORAL at 09:14

## 2025-02-14 RX ADMIN — WATER 2000 MG: 1 INJECTION INTRAMUSCULAR; INTRAVENOUS; SUBCUTANEOUS at 03:28

## 2025-02-14 RX ADMIN — ATORVASTATIN CALCIUM 40 MG: 40 TABLET, FILM COATED ORAL at 21:32

## 2025-02-14 RX ADMIN — DOCUSATE SODIUM 100 MG: 100 CAPSULE, LIQUID FILLED ORAL at 21:32

## 2025-02-14 RX ADMIN — PHENYLEPHRINE HYDROCHLORIDE 300 MCG: 10 INJECTION INTRAVENOUS at 14:35

## 2025-02-14 RX ADMIN — SODIUM BICARBONATE: 84 INJECTION, SOLUTION INTRAVENOUS at 13:52

## 2025-02-14 RX ADMIN — ENOXAPARIN SODIUM 30 MG: 100 INJECTION SUBCUTANEOUS at 09:16

## 2025-02-14 ASSESSMENT — LIFESTYLE VARIABLES: SMOKING_STATUS: 1

## 2025-02-14 ASSESSMENT — PAIN SCALES - GENERAL
PAINLEVEL_OUTOF10: 5
PAINLEVEL_OUTOF10: 0

## 2025-02-14 NOTE — ANESTHESIA PRE PROCEDURE
Department of Anesthesiology  Preprocedure Note       Name:  Brigid Moses   Age:  55 y.o.  :  1970                                          MRN:  [id number]         Date:  2025      Surgeon: Surgeon(s):  Leon Mota MD    Procedure: Procedure(s):  CYSTOSCOPY RETROGRADE PYELOGRAM LEFT STENT INSERTION POSSIBLE RIGHT STENT INSERTION    Medications prior to admission:   Prior to Admission medications    Medication Sig Start Date End Date Taking? Authorizing Provider   amitriptyline (ELAVIL) 25 MG tablet Take 1 tablet by mouth nightly   Yes ProviderRuchi MD   aspirin 81 MG chewable tablet Take 1 tablet by mouth daily   Yes ProviderRuchi MD   polycarbophil (FIBERCON) 625 MG tablet Take 1 tablet by mouth in the morning and at bedtime   Yes Ruchi Bowers MD   hydrOXYzine HCl (ATARAX) 25 MG tablet Take 1 tablet by mouth in the morning, at noon, and at bedtime   Yes ProviderRuchi MD   lisinopril (PRINIVIL;ZESTRIL) 2.5 MG tablet Take 1 tablet by mouth daily Hold for SBP less than 100   Yes ProviderRuchi MD   magnesium oxide (MAG-OX) 400 (240 Mg) MG tablet Take 1 tablet by mouth daily   Yes ProviderRuchi MD   megestrol (MEGACE) 20 MG tablet Take 1 tablet by mouth daily   Yes ProviderRuchi MD   famotidine (PEPCID) 20 MG tablet Take 1 tablet by mouth daily   Yes Ruchi Bowesr MD   mirtazapine (REMERON) 15 MG tablet Take 1 tablet by mouth daily Indications: appetite stimulant   Yes ProviderRuchi MD   Cholecalciferol (VITAMIN D) 50 MCG (2000 UT) CAPS capsule Take by mouth daily   Yes ProviderRuchi MD   vitamin C (ASCORBIC ACID) 500 MG tablet Take 1 tablet by mouth 2 times daily   Yes Provider, MD Ruchi   ferrous sulfate (IRON 325) 325 (65 Fe) MG tablet Take 1 tablet by mouth daily (with breakfast)   Yes Ruchi Bowers MD   Multiple Vitamins-Minerals (THERAPEUTIC MULTIVITAMIN-MINERALS) tablet Take 1 tablet by mouth daily   Yes

## 2025-02-14 NOTE — PROGRESS NOTES
OCCUPATIONAL THERAPY INITIAL EVALUATION    [facility]  [address] [place]        Date:2025                                                  Patient Name: Brigid Moses    MRN: [id number]    : 1970    Room: [address] [place] [address]      Evaluating OT: Pietro King OTR/L; #[id number]     Referring Provider and Specific Provider Orders/Date:      25  OT eval and treat  Start:  25,   End:  25,   ONE TIME,   Standing Count:  1 Occurrences,   R         Risa Grande DO      Placement Recommendation: SNF       Diagnosis:   1. Septicemia (HCC)    2. Urinary tract infection with hematuria, site unspecified    3. AMBROCIO (acute kidney injury) (HCC)    4. Constipation, unspecified constipation type    5. Kidney stone    6. Bladder stone         Surgery: None      Pertinent Medical History:       Past Medical History:   Diagnosis Date    Altered mental status, unspecified     Anemia, iron deficiency     Anemia, unspecified     Cerebral artery occlusion with cerebral infarction (HCC)     Dysphagia following cerebral infarction     Gastroesophageal reflux disease     without esophagitis    Hemiplegia and hemiparesis following cerebral infarction affecting left non-dominant side (HCC)     Hypertension     Intestinal malabsorption, unspecified     Major depressive disorder, recurrent (HCC)     Major depressive disorder, recurrent (HCC) 2022    Panic attacks          Past Surgical History:   Procedure Laterality Date    BLADDER SURGERY Bilateral 2022    CYSTOSCOPY RETROGRADE PYELOGRAM BILATERAL  STENT INSERTION performed by Benedicto Eugene DO at [facility] OR    BLADDER SURGERY Bilateral 2022    CYSTOSCOPY RETROGRADE PYELOGRAM,  URETEROSCOPY,  LASER LITHOTRIPSY - BILATERAL, WITH BILATERAL STENT EXCHANGE performed by Prince Mays MD at [facility] OR    COLONOSCOPY      ENDOSCOPY, COLON, DIAGNOSTIC      FOOT SURGERY      right toes

## 2025-02-14 NOTE — H&P
involving the descending and sigmoid colon with distension of the sigmoid colon with bowel wall thickening.  Correlate with stercoral colitis clinically.  Normal small bowel appendix. Pelvis: Multiple subcentimeter bladder calculi involving the left bladder floor.. Peritoneum/Retroperitoneum: No free fluid or free air. Bones/Soft Tissues: Degenerative changes lumbar spine and hip joints.     1. Extensive bilateral staghorn like calculi. 2. 5 mm and 3 mm calculi in the left proximal to mid ureter and 4 mm calculus left mid ureter. 3. Multiple subcentimeter bladder calculi involving the left bladder floor. 4. Colonic fecal retention involving the descending and sigmoid colon with distension of the sigmoid colon with bowel wall thickening. Correlate with stercoral colitis clinically.     US ABDOMEN LIMITED    Result Date: 2/13/2025  EXAMINATION: RIGHT UPPER QUADRANT ULTRASOUND 2/13/2025 4:54 pm COMPARISON: None. HISTORY: ORDERING SYSTEM PROVIDED HISTORY: Pain TECHNOLOGIST PROVIDED HISTORY: Reason for exam:->Pain What reading provider will be dictating this exam?->CRC FINDINGS: LIVER:  The liver demonstrates normal echogenicity without evidence of intrahepatic biliary ductal dilatation. BILIARY SYSTEM: Gallbladder wall thickening up to 4 mm.  No stones, sludge or pericholecystic fluid.  Negative sonographic Mantilla's sign.  Common bile duct is not visualized. RIGHT KIDNEY: Nonobstructing right renal calculi.  No hydronephrosis. PANCREAS: Not visualized. OTHER: No evidence of right upper quadrant ascites.     1. Nonspecific gallbladder wall thickening. No stones, sludge or pericholecystic fluid. Negative sonographic Mantilla's sign. 2. Nonobstructing right renal calculi. No hydronephrosis.     XR CHEST 1 VIEW    Result Date: 2/13/2025  EXAMINATION: ONE XRAY VIEW OF THE CHEST 2/13/2025 4:46 pm COMPARISON: None. HISTORY: ORDERING SYSTEM PROVIDED HISTORY: ? pneumonia TECHNOLOGIST PROVIDED HISTORY: Reason for exam:->? pneumonia

## 2025-02-14 NOTE — PROGRESS NOTES
4 Eyes Skin Assessment     NAME:  Brigid Moses  YOB: 1970  MEDICAL RECORD NUMBER:  [id number]    The patient is being assessed for  Admission    I agree that at least one RN has performed a thorough Head to Toe Skin Assessment on the patient. ALL assessment sites listed below have been assessed.      Areas assessed by both nurses:    Head, Face, Ears, Shoulders, Back, Chest, Arms, Elbows, Hands, Sacrum. Buttock, Coccyx, Ischium, Legs. Feet and Heels, and Under Medical Devices         Does the Patient have a Wound? No noted wound(s)       John Prevention initiated by RN: No  Wound Care Orders initiated by RN: No    Pressure Injury (Stage 3,4, Unstageable, DTI, NWPT, and Complex wounds) if present, place Wound referral order by RN under : No    New Ostomies, if present place, Ostomy referral order under : No     Nurse 1 eSignature: Electronically signed by Elizabeth Vasques RN on 2/14/25 at 4:54 AM EST    **SHARE this note so that the co-signing nurse can place an eSignature**    Nurse 2 eSignature: Electronically signed by Paula Peck RN on 2/14/25 at 5:15 AM EST

## 2025-02-14 NOTE — OP NOTE
Operative Note      Patient: Brigid Moses  YOB: 1970  MRN: [id number]    Date of Procedure: 2/14/2025    Pre-Op Diagnosis Codes:      *Bilateral nephrolithiasis    Post-Op Diagnosis: Same       Procedure(s):  CYSTOSCOPY left retrograde pyelogram, attempted stent placement    Surgeon(s):  Leon Mota MD    Assistant:   * No surgical staff found *    Anesthesia: Monitor Anesthesia Care    Estimated Blood Loss (mL): Minimal    Complications: Other: See dictation below    Specimens:   ID Type Source Tests Collected by Time Destination   A : BLADDER STONE Tissue Tissue SURGICAL PATHOLOGY Leon Mota MD 2/14/2025 1450        Implants:  * No implants in log *      Drains:   Urinary Catheter 02/14/25 2 Way;Feliz (Active)       Findings:  See below    Detailed Description of Procedure:   Indications:  55-year-old female presenting with bilateral staghorn calculi, numerous stones in the left ureter.  She has had several bilateral stone procedures with Dr. Khan in the past and some of our other partners.  She now presents with bilateral staghorn calculi and numerous stones in the left ureter on CT.  She was also admitted with a UTI and concern for sepsis of urinary origin.  Her lab work and vitals have trended favorably since admission.  Decision was made for attempt at ureteral stent placement in preparation for further stone procedures.    Procedure in detail:  After the risks, benefits, indications, alternatives, and expectations of the procedure were reviewed with the patient and informed consent was obtained, the patient was taken to the operative suite where anesthesia was induced in supine position. The patient was then transitioned to lithotomy position, and prepped and draped in the usual sterile fashion for cystoscopy.  A timeout was observed prior to initiating the procedure in which we confirmed the patient identity and the procedure planned.  We also confirmed that the patient received

## 2025-02-14 NOTE — ED NOTES
Attemptd to draw lactic multiple times unsuccessfully. Patient now refusing to be poked any more.

## 2025-02-14 NOTE — ANESTHESIA POSTPROCEDURE EVALUATION
Department of Anesthesiology  Postprocedure Note    Patient: Brigid Moses  MRN: [id number]  YOB: 1970  Date of evaluation: 2/14/2025    Procedure Summary       Date: 02/14/25 Room / Location: [address]    Anesthesia Start: 1403 Anesthesia Stop: 1505    Procedure: CYSTOSCOPY RETROGRADE PYELOGRAM LEFT STENT INSERTION POSSIBLE RIGHT STENT INSERTION (Bilateral) Diagnosis:       Hydronephrosis, unspecified hydronephrosis type      (Hydronephrosis, unspecified hydronephrosis type [N13.30])    Surgeons: eLon Mota MD Responsible Provider: Hi Meza MD    Anesthesia Type: MAC ASA Status: 3            Anesthesia Type: No value filed.    Radha Phase I:      Radha Phase II: Radha Score: 10    Anesthesia Post Evaluation    Patient location during evaluation: PACU  Patient participation: complete - patient participated  Level of consciousness: awake  Airway patency: patent  Nausea & Vomiting: no nausea and no vomiting  Cardiovascular status: hemodynamically stable  Respiratory status: acceptable  Hydration status: euvolemic  Pain management: adequate    No notable events documented.

## 2025-02-14 NOTE — ANESTHESIA PRE PROCEDURE
Department of Anesthesiology  Preprocedure Note       Name:  Brigid Moses   Age:  55 y.o.  :  1970                                          MRN:  [id number]         Date:  2025      Surgeon: Surgeon(s):  Leon Mota MD    Procedure: Procedure(s):  CYSTOSCOPY RETROGRADE PYELOGRAM LEFT STENT INSERTION POSSIBLE RIGHT STENT INSERTION    Medications prior to admission:   Prior to Admission medications    Medication Sig Start Date End Date Taking? Authorizing Provider   amitriptyline (ELAVIL) 25 MG tablet Take 1 tablet by mouth nightly   Yes ProviderRuchi MD   aspirin 81 MG chewable tablet Take 1 tablet by mouth daily   Yes ProviderRuchi MD   polycarbophil (FIBERCON) 625 MG tablet Take 1 tablet by mouth in the morning and at bedtime   Yes Ruchi Bowers MD   hydrOXYzine HCl (ATARAX) 25 MG tablet Take 1 tablet by mouth in the morning, at noon, and at bedtime   Yes ProviderRuchi MD   lisinopril (PRINIVIL;ZESTRIL) 2.5 MG tablet Take 1 tablet by mouth daily Hold for SBP less than 100   Yes ProviderRuchi MD   magnesium oxide (MAG-OX) 400 (240 Mg) MG tablet Take 1 tablet by mouth daily   Yes ProviderRuchi MD   megestrol (MEGACE) 20 MG tablet Take 1 tablet by mouth daily   Yes ProviderRuchi MD   famotidine (PEPCID) 20 MG tablet Take 1 tablet by mouth daily   Yes Ruchi Bowers MD   mirtazapine (REMERON) 15 MG tablet Take 1 tablet by mouth daily Indications: appetite stimulant   Yes ProviderRuchi MD   Cholecalciferol (VITAMIN D) 50 MCG (2000 UT) CAPS capsule Take by mouth daily   Yes ProviderRuchi MD   vitamin C (ASCORBIC ACID) 500 MG tablet Take 1 tablet by mouth 2 times daily   Yes Provider, MD Ruchi   ferrous sulfate (IRON 325) 325 (65 Fe) MG tablet Take 1 tablet by mouth daily (with breakfast)   Yes Ruchi Bowers MD   Multiple Vitamins-Minerals (THERAPEUTIC MULTIVITAMIN-MINERALS) tablet Take 1 tablet by mouth daily   Yes

## 2025-02-14 NOTE — ACP (ADVANCE CARE PLANNING)
Advance Care Planning   Healthcare Decision Maker:    Primary Decision Maker: Sergey Chiang - Aunt/Uncle, Legal Guardian - [phone]    Secondary Decision Maker: BHANU CHIANG - Other Relative - [phone]

## 2025-02-14 NOTE — PROGRESS NOTES
[facility] Hospitalist   Progress Note    Admitting Date and Time: 2/13/2025  1:49 PM  Admit Dx: Kidney stone [N20.0]  Bladder stone [N21.0]  Septicemia (HCC) [A41.9]  AMBROCIO (acute kidney injury) (HCC) [N17.9]  Sepsis due to urinary tract infection (HCC) [A41.9, N39.0]  Urinary tract infection with hematuria, site unspecified [N39.0, R31.9]  Constipation, unspecified constipation type [K59.00]    Subjective/interval history:    Pt admitted from nursing facility yesterday evening with sepsis due to urinary tract infection with septic encephalopathy.  CT of the abdomen pelvis also significant for staghorn calculi.    This morning she is awake, alert, oriented x 3 but intermittently confused.  Family at bedside and note that this is not her baseline mentation.    Vital signs significant for tachycardia and blood pressure is borderline but she is not toxic appearing.    ROS: denies fever, chills, cp, sob, n/v, HA unless stated above.     atorvastatin  40 mg Oral Nightly    calcium carbonate  2 tablet Oral Daily    Vitamin D  2,000 Units Oral Daily    docusate sodium  100 mg Oral BID    ferrous sulfate  325 mg Oral Daily with breakfast    melatonin  5 mg Oral Nightly    mirtazapine  15 mg Oral Nightly    therapeutic multivitamin-minerals  1 tablet Oral Daily    polyethylene glycol  17 g Oral Daily    sertraline  25 mg Oral Daily    vitamin C  500 mg Oral BID    zinc sulfate  50 mg Oral Daily    sodium chloride flush  5-40 mL IntraVENous 2 times per day    [START ON 2/15/2025] cefTRIAXone (ROCEPHIN) IV  2,000 mg IntraVENous Q24H    sodium chloride flush  5-40 mL IntraVENous 2 times per day    enoxaparin  30 mg SubCUTAneous Daily     bisacodyl, 10 mg, Daily PRN  naloxone 0.4 mg in 10 mL sodium chloride syringe, , PRN  sodium chloride flush, 5-40 mL, PRN  sodium chloride, , PRN  meperidine, 12.5 mg, Q5 Min PRN  fentanNYL, 25 mcg, Q5 Min PRN  HYDROmorphone, 0.5 mg, Q5 Min PRN  prochlorperazine, 5 mg, Once   Continued Stay SW/CM Assessment/Plan of Care Note     Progress note:    Active Substitute Decision Maker (SDM)    There are no active Substitute Decision Maker (SDM) on file.        Rounding completed at patients bedside. Patient recently discharged from [facility]. Home health was arranged with [facility] for patient but he was not home long enough to start sercives.  Per patient he wasn't receiving \"intesnse\" PT/OT there and was not improving. Per patient he is so weak that he can not stand and pivot. Patient also mentioned that there is no heat or water at house. Patient stated friend has his keys and will be fixing the problems.     Long discussion occurred regarding safe discharge plan. Patient is aware that he may need additional therapy at a SNF if unable to stand and pivot to be able to function at home. Patient lives alone and \"does not\" have anyone to stay with him. Patient is agreeable to go back to [facility] if additional therapy is needs.     Patient stated that Cynthia is his emergency contact if needed.      CM will start referral with [facility] and [facility].    See SW/CM flowsheets for other objective data.    Disposition Recommendations:  Preliminary discharge destination: Planned Discharge Destination: Other (Comment) (TBD  perfers )  SW/CM recommendation for discharge: Other (comment) (TBD)    Discharge Plan/Needs:     Preferred pharmacy: No Pharmacies Listed    Continued Care and Services - Admitted Since 1/18/2023    Coordination has not been started for this encounter.           Devices/ Equipment that need to be arranged for discharge:     Accepted   Pending insurance authorization   Others:    Anticipated date of DME availability:     Prior To Hospitalization:    Living Situation: Alone and residing at [place].    Support Systems: Friends   Home Devices/Equipment:     Mobility Assist Devices:     Type of Service Prior to Hospitalization:        Patient/Family discharge goal (s):  Home therapy     Resources provided:           Therapy Recommendations for Discharge:   PT:      OT:       SLP:      Mobility Equipment Recommended for Discharge:        Barriers to Discharge  Identified Barriers to Discharge/Transition Planning: Medical necessity for acute care (cdiff ruleout, AB, IVF, labs/replacements, transfer to floor)

## 2025-02-14 NOTE — PROGRESS NOTES
Physical Therapy Initial Evaluation/Plan of Care    Room #:  0518/0518-01  Patient Name: Brigid Moses  YOB: 1970  MRN: [id number]    Date of Service: 2/14/2025     Tentative placement recommendation: Skilled Nursing Facility   Equipment recommendation: Equipment at Nursing Home      Evaluating Physical Therapist: Leon Riddle PT Lic.  #[id number]      Specific Provider Orders/Date/Referring Provider :  02/13/25 2300    PT evaluation and treat  Start:  02/13/25 2300,   End:  02/13/25 2300,   ONE TIME,   Standing Count:  1 Occurrences,   R       Risa Grande DO    Admitting Diagnosis:   Kidney stone [N20.0]  Bladder stone [N21.0]  Septicemia (HCC) [A41.9]  AMBROCIO (acute kidney injury) (HCC) [N17.9]  Sepsis due to urinary tract infection (HCC) [A41.9, N39.0]  Urinary tract infection with hematuria, site unspecified [N39.0, R31.9]  Constipation, unspecified constipation type [K59.00]       Surgery: none  Visit Diagnoses         Codes    Septicemia (HCC)    -  Primary A41.9    Urinary tract infection with hematuria, site unspecified     N39.0, R31.9    AMBROCIO (acute kidney injury) (HCC)     N17.9    Kidney stone     N20.0    Bladder stone     N21.0            Patient Active Problem List   Diagnosis    Hammer toe, acquired    Altered mental status    Anemia, unspecified    Dysphagia following cerebral infarction    Hemiplegia and hemiparesis following cerebral infarction affecting left non-dominant side (HCC)    Primary hypertension    Major depressive disorder, recurrent (HCC)    Cerebral artery occlusion with cerebral infarction (HCC)    Sepsis due to urinary tract infection (HCC)    Constipation        ASSESSMENT of Current Deficits Patient exhibits decreased strength, balance, endurance, and pain    impairing functional mobility, transfers, tolerance to activity, and participation  .  . Patient exhibits decreased strength and endurance impairing functional mobility and tolerance to activity .  These are barriers

## 2025-02-14 NOTE — CONSULTS
2/14/2025 2:06 PM  Brigid Moses  [id number]     Chief Complaint:    Bilateral nephrolithiasis    History of Present Illness:      The patient is a 55 y.o. female patient who has previously undergone several stone procedures with Dr. Khan, now presenting with bilateral staghorn calculi and numerous stones in the left ureter.  Her urinalysis at admission was notably very dirty and she has had both tachycardia and an elevated lactate concerning for sepsis of urinary origin.  Given the bilateral calculi she has been booked for the OR for bilateral stent placement.    I discussed with the patient risks of procedure including bleeding; infection; injury to the bladder/ureter/kidney and surrounding organs; risks of ureteral stent including retained stent, encrusted stent, loss of kidney; and risks of anesthesia including heart attack, stroke, pulmonary embolism, death.  Patient understood all these risks and is willing to proceed with the procedure.       Past Medical History:   Diagnosis Date    Altered mental status, unspecified     Anemia, iron deficiency     Anemia, unspecified     Cerebral artery occlusion with cerebral infarction (HCC)     Dysphagia following cerebral infarction     Gastroesophageal reflux disease     without esophagitis    Hemiplegia and hemiparesis following cerebral infarction affecting left non-dominant side (HCC)     Hypertension     Intestinal malabsorption, unspecified     Major depressive disorder, recurrent (HCC)     Major depressive disorder, recurrent (HCC) 9/14/2022    Panic attacks          Past Surgical History:   Procedure Laterality Date    BLADDER SURGERY Bilateral 2/11/2022    CYSTOSCOPY RETROGRADE PYELOGRAM BILATERAL  STENT INSERTION performed by Benedicto Eugene DO at [facility] OR    BLADDER SURGERY Bilateral 4/12/2022    CYSTOSCOPY RETROGRADE PYELOGRAM,  URETEROSCOPY,  LASER LITHOTRIPSY - BILATERAL, WITH BILATERAL STENT EXCHANGE performed by Prince Mays MD at [facility] OR

## 2025-02-14 NOTE — CARE COORDINATION
2/14/25 0923 CM note: covid/flu (-), bl cx 2/13/25 ngtd, urine cx 2/13/25- in process. IVF and IV rocephin. Room air. Urology and PT/OT following. Hx CVA w/ hemiplegia & hemiparesis. CT abd- extensive amauri staghorn like calculi and 5 mm and 3 mm calculi in the L proximal to mid ureter and 4 mm calculus L mid ureter. Met with patient at the bedside to discuss transition of care at discharge. Pt came to us from [facility] and would like to return to this facility at discharge. Per [facility] liaison, pt is LTC at their facility, if she has a therapy/skilled need can bring back precert pending. WILL NEED SIGNED ENRIQUE AND DC ORDER. Cm WILL FOLLOW. Electronically signed by Kasey Rocha RN on 2/14/2025 at 12:25 PM       Spironolactone Pregnancy And Lactation Text: This medication can cause feminization of the male fetus and should be avoided during pregnancy. The active metabolite is also found in breast milk.

## 2025-02-14 NOTE — DISCHARGE INSTR - COC
Continuity of Care Form    Patient Name: Brigid Moses   :  1970  MRN:  [id number]    Admit date:  2025  Discharge date:  25    Code Status Order: Full Code   Advance Directives:   Advance Care Flowsheet Documentation             Admitting Physician:  Risa Grande DO  PCP: Yessenia Perry MD    Discharging Nurse:   Discharging Hospital Unit/Room#: 0518/0518-01  Discharging Unit Phone Number: [phone]    Emergency Contact:   Extended Emergency Contact Information  Primary Emergency Contact: BHANU JARVIS  Home Phone: [phone]  Relation: Other Relative   needed? No  Secondary Emergency Contact: АндрейSergey  Address: [address] [place] [address] [place] [address]  Home Phone: [phone]  Mobile Phone: [phone]  Relation: Aunt/Uncle    Past Surgical History:  Past Surgical History:   Procedure Laterality Date    BLADDER SURGERY Bilateral 2022    CYSTOSCOPY RETROGRADE PYELOGRAM BILATERAL  STENT INSERTION performed by Benedicto Eugene DO at [facility] OR    BLADDER SURGERY Bilateral 2022    CYSTOSCOPY RETROGRADE PYELOGRAM,  URETEROSCOPY,  LASER LITHOTRIPSY - BILATERAL, WITH BILATERAL STENT EXCHANGE performed by Prince Mays MD at [facility] OR    COLONOSCOPY      ENDOSCOPY, COLON, DIAGNOSTIC      FOOT SURGERY      right toes    FOOT SURGERY Left 3/27/13    correction 4th, 5th toes left foot    LAPAROTOMY      LITHOTRIPSY Bilateral 3/8/2022    CYSTOSCOPY RETROGRADE PYELOGRAM URETEROSCOPY, LASER LITHOTRIPSY performed by Prince Mays MD at [facility] OR    UPPER GASTROINTESTINAL ENDOSCOPY  2014       Immunization History:   Immunization History   Administered Date(s) Administered    COVID-19, MODERNA BLUE border, Primary or Immunocompromised, (age 12y+), IM, 100 mcg/0.5mL 2021, 2021    TDaP, ADACEL (age 10y-64y), BOOSTRIX (age 10y+), IM, 0.5mL 2019       Active Problems:  Patient Active Problem List   Diagnosis Code    Hammer toe, acquired M20.40

## 2025-02-14 NOTE — ANESTHESIA PRE PROCEDURE
Department of Anesthesiology  Preprocedure Note       Name:  Brigid Moses   Age:  55 y.o.  :  1970                                          MRN:  [id number]         Date:  2025      Surgeon: Surgeon(s):  Leon Mota MD    Procedure: Procedure(s):  CYSTOSCOPY RETROGRADE PYELOGRAM LEFT STENT INSERTION POSSIBLE RIGHT STENT INSERTION    Medications prior to admission:   Prior to Admission medications    Medication Sig Start Date End Date Taking? Authorizing Provider   amitriptyline (ELAVIL) 25 MG tablet Take 1 tablet by mouth nightly   Yes ProviderRuchi MD   aspirin 81 MG chewable tablet Take 1 tablet by mouth daily   Yes ProviderRuchi MD   polycarbophil (FIBERCON) 625 MG tablet Take 1 tablet by mouth in the morning and at bedtime   Yes Ruchi Bowers MD   hydrOXYzine HCl (ATARAX) 25 MG tablet Take 1 tablet by mouth in the morning, at noon, and at bedtime   Yes ProviderRuchi MD   lisinopril (PRINIVIL;ZESTRIL) 2.5 MG tablet Take 1 tablet by mouth daily Hold for SBP less than 100   Yes ProviderRuchi MD   magnesium oxide (MAG-OX) 400 (240 Mg) MG tablet Take 1 tablet by mouth daily   Yes ProviderRuchi MD   megestrol (MEGACE) 20 MG tablet Take 1 tablet by mouth daily   Yes ProviderRuchi MD   famotidine (PEPCID) 20 MG tablet Take 1 tablet by mouth daily   Yes Ruchi Bowers MD   mirtazapine (REMERON) 15 MG tablet Take 1 tablet by mouth daily Indications: appetite stimulant   Yes ProviderRuchi MD   Cholecalciferol (VITAMIN D) 50 MCG (2000 UT) CAPS capsule Take by mouth daily   Yes ProviderRuchi MD   vitamin C (ASCORBIC ACID) 500 MG tablet Take 1 tablet by mouth 2 times daily   Yes Provider, MD Ruchi   ferrous sulfate (IRON 325) 325 (65 Fe) MG tablet Take 1 tablet by mouth daily (with breakfast)   Yes Ruchi Bowers MD   Multiple Vitamins-Minerals (THERAPEUTIC MULTIVITAMIN-MINERALS) tablet Take 1 tablet by mouth daily   Yes

## 2025-02-14 NOTE — CONSULTS
Comprehensive Nutrition Assessment    Type and Reason for Visit:  Initial, Consult    Nutrition Recommendations/Plan:   Continue current diet and encourage PO intake >75% of meals and ONS   Recommend ONS Ensure Plus HP BID        Malnutrition Assessment:  Malnutrition Status:  Moderate malnutrition (02/14/25 0923)    Context:  Chronic Illness     Findings of the 6 clinical characteristics of malnutrition:  Energy Intake:  75% or less estimated energy requirements for 1 month or longer  Weight Loss:  Unable to assess     Body Fat Loss:   (moderate) Orbital, Triceps, Buccal region   Muscle Mass Loss:   (moderate) Temples (temporalis), Clavicles (pectoralis & deltoids), Hand (interosseous)  Fluid Accumulation:  No fluid accumulation     Strength:  Not Performed    Nutrition Assessment:    Pt admit w/ low BP & sepsis r/t UTI, noted Staghorn calculus, Hypokalemia. PMHx of CVA w/ L hemiplegia resides at SNF, depression, HTN, GERD. Pt meets criteria for moderate malnutrition. Pt is on regular diet, reports poor appetite, no intake documented yet this admit. Will add ONS BID and monitor nutrition progression.    Nutrition Related Findings:    abd WDL, +3 edema BLE, A&Ox3, chronic diarrhea Wound Type: None       Current Nutrition Intake & Therapies:    Average Meal Intake: Unable to assess (not documented yet this admit)  Average Supplements Intake: Unable to assess  ADULT DIET; Regular    Anthropometric Measures:  Height: 157.7 cm (5' 2.09\")  Ideal Body Weight (IBW): 110 lbs (50 kg)    Admission Body Weight: 45.8 kg (101 lb) (2/14 bed scale)  Current Body Weight: 45.8 kg (100 lb 15.5 oz) (2/14 bed scale), 91.8 % IBW.    Current BMI (kg/m2): 18.4  Usual Body Weight:  (DANILO d/t lack of recent wt hx, noted 122lb in 4/2022 and 167lb in 11/2018 indicates continued wt loss)        Weight Adjustment For: No Adjustment                 BMI Categories: Underweight (BMI less than 18.5)    Estimated Daily Nutrient Needs:  Energy

## 2025-02-15 PROCEDURE — 2580000003 HC RX 258: Performed by: INTERNAL MEDICINE

## 2025-02-15 PROCEDURE — 2500000003 HC RX 250 WO HCPCS: Performed by: INTERNAL MEDICINE

## 2025-02-15 PROCEDURE — 6360000002 HC RX W HCPCS: Performed by: FAMILY MEDICINE

## 2025-02-15 PROCEDURE — 6360000002 HC RX W HCPCS: Performed by: INTERNAL MEDICINE

## 2025-02-15 PROCEDURE — 2500000003 HC RX 250 WO HCPCS: Performed by: FAMILY MEDICINE

## 2025-02-15 PROCEDURE — 2060000000 HC ICU INTERMEDIATE R&B

## 2025-02-15 PROCEDURE — 99233 SBSQ HOSP IP/OBS HIGH 50: CPT | Performed by: INTERNAL MEDICINE

## 2025-02-15 PROCEDURE — 6370000000 HC RX 637 (ALT 250 FOR IP): Performed by: FAMILY MEDICINE

## 2025-02-15 RX ADMIN — DOCUSATE SODIUM 100 MG: 100 CAPSULE, LIQUID FILLED ORAL at 08:51

## 2025-02-15 RX ADMIN — Medication 2000 UNITS: at 08:51

## 2025-02-15 RX ADMIN — SODIUM CHLORIDE, PRESERVATIVE FREE 10 ML: 5 INJECTION INTRAVENOUS at 08:52

## 2025-02-15 RX ADMIN — DOCUSATE SODIUM 100 MG: 100 CAPSULE, LIQUID FILLED ORAL at 21:18

## 2025-02-15 RX ADMIN — Medication 5 MG: at 21:18

## 2025-02-15 RX ADMIN — SODIUM CHLORIDE, PRESERVATIVE FREE 10 ML: 5 INJECTION INTRAVENOUS at 21:18

## 2025-02-15 RX ADMIN — Medication 1 TABLET: at 08:51

## 2025-02-15 RX ADMIN — SERTRALINE HYDROCHLORIDE 25 MG: 50 TABLET ORAL at 08:49

## 2025-02-15 RX ADMIN — POLYETHYLENE GLYCOL 3350 17 G: 17 POWDER, FOR SOLUTION ORAL at 08:51

## 2025-02-15 RX ADMIN — ZINC SULFATE 220 MG (50 MG) CAPSULE 50 MG: CAPSULE at 08:51

## 2025-02-15 RX ADMIN — MEROPENEM 1000 MG: 1 INJECTION INTRAVENOUS at 21:21

## 2025-02-15 RX ADMIN — SODIUM BICARBONATE: 84 INJECTION, SOLUTION INTRAVENOUS at 01:52

## 2025-02-15 RX ADMIN — WATER 2000 MG: 1 INJECTION INTRAMUSCULAR; INTRAVENOUS; SUBCUTANEOUS at 03:16

## 2025-02-15 RX ADMIN — FERROUS SULFATE TAB 325 MG (65 MG ELEMENTAL FE) 325 MG: 325 (65 FE) TAB at 08:51

## 2025-02-15 RX ADMIN — MIRTAZAPINE 15 MG: 15 TABLET, FILM COATED ORAL at 21:18

## 2025-02-15 RX ADMIN — OXYCODONE HYDROCHLORIDE AND ACETAMINOPHEN 500 MG: 500 TABLET ORAL at 08:51

## 2025-02-15 RX ADMIN — MEROPENEM 1000 MG: 1 INJECTION INTRAVENOUS at 14:42

## 2025-02-15 RX ADMIN — ENOXAPARIN SODIUM 30 MG: 100 INJECTION SUBCUTANEOUS at 08:51

## 2025-02-15 RX ADMIN — OXYCODONE HYDROCHLORIDE AND ACETAMINOPHEN 500 MG: 500 TABLET ORAL at 21:18

## 2025-02-15 RX ADMIN — CALCIUM CARBONATE 1000 MG: 500 TABLET, CHEWABLE ORAL at 08:51

## 2025-02-15 NOTE — CONSULTS
CONSULTATION NOTE :ID     Patient - Brigid Moses,  Age - 55 y.o.    - 1970      Room Number - 0518/0518-01   MRN -  [id number]   [facility] # - [id number]  Date of Admission -  2025  1:49 PM  Patient's PCP: Yessenia Perry MD     Requesting Physician: Wili Walker DO    REASON FOR CONSULTATION   atbx  HISTORY OF PRESENT ILLNESS   This is a 55 y.o. female who was admitted on 2025   to the hospital with a chief complaints of   Chief Complaint   Patient presents with    Hypotension     ID was consulted on 02/15/25  Pt from SNF  Came in with uti   Afebrile tachy RA wc12.5   Cr1.1->0.8   UA cloudy rbc hgb le bacteria   Urine cx Pseudomonas ESBL E coli   Has purwick     Fluoro For Surgical Procedures    Result Date: 2025  Intraprocedural fluoroscopic spot images as above.  See separate procedure report for more information.     CT ABDOMEN PELVIS W IV CONTRAST Additional Contrast? None    Result Date: 2025  1. Extensive bilateral staghorn like calculi. 2. 5 mm and 3 mm calculi in the left proximal to mid ureter and 4 mm calculus left mid ureter. 3. Multiple subcentimeter bladder calculi involving the left bladder floor. 4. Colonic fecal retention involving the descending and sigmoid colon with distension of the sigmoid colon with bowel wall thickening. Correlate with stercoral colitis clinically.     US ABDOMEN LIMITED    Result Date: 2025  1. Nonspecific gallbladder wall thickening. No stones, sludge or pericholecystic fluid. Negative sonographic Mantilla's sign. 2. Nonobstructing right renal calculi. No hydronephrosis.     XR CHEST 1 VIEW    Result Date: 2025  No acute process.      Current antibiotics  meropenem (MERREM) 1,000 mg in sodium chloride 0.9 % 100 mL IVPB ([id number]), Q8H       Followed by Urology   S/p CYSTOSCOPY left retrograde pyelogram, attempted stent placement   Per uro will require bilateral PCNL's for stone clearance.  This likely will

## 2025-02-15 NOTE — PROGRESS NOTES
[facility] Hospitalist   Progress Note    Admitting Date and Time: 2/13/2025  1:49 PM  Admit Dx: Kidney stone [N20.0]  Bladder stone [N21.0]  Septicemia (HCC) [A41.9]  AMBROCIO (acute kidney injury) (HCC) [N17.9]  Sepsis due to urinary tract infection (HCC) [A41.9, N39.0]  Urinary tract infection with hematuria, site unspecified [N39.0, R31.9]  Constipation, unspecified constipation type [K59.00]    Subjective/interval history:    2/14: Pt admitted from nursing facility yesterday evening with sepsis due to urinary tract infection with septic encephalopathy.  CT of the abdomen pelvis also significant for staghorn calculi.    This morning she is awake, alert, oriented x 3 but intermittently confused.  Family at bedside and note that this is not her baseline mentation.    Vital signs significant for tachycardia and blood pressure is borderline but she is not toxic appearing.    2/15: Patient is awake, alert, oriented x 3.  Her mentation seems to be improving.  Taken the OR yesterday for cystoscopy with attempted stent placement, however ureteral stones precluded successful stent placement.  Per nephrology may need referral to [facility] for percutaneous nephrolithotomy, and will require percutaneous nephrostomy tube here.  I discussed with the patient that she may need further procedures inpatient versus outpatient at outside facility, she notes that she does not want to consent to any procedures at this time.  I mended she take some time to think about it and discuss with her family.  Urine culture is now showing ESBL producing E. coli and Pseudomonas aeruginosa.    ROS: denies fever, chills, cp, sob, n/v, HA unless stated above.     meropenem  1,000 mg IntraVENous Q8H    atorvastatin  40 mg Oral Nightly    calcium carbonate  2 tablet Oral Daily    Vitamin D  2,000 Units Oral Daily    docusate sodium  100 mg Oral BID    ferrous sulfate  325 mg Oral Daily with breakfast    melatonin  5 mg Oral Nightly

## 2025-02-15 NOTE — PLAN OF CARE
Problem: Chronic Conditions and Co-morbidities  Goal: Patient's chronic conditions and co-morbidity symptoms are monitored and maintained or improved  Outcome: Progressing     Problem: Discharge Planning  Goal: Discharge to home or other facility with appropriate resources  Outcome: Progressing     Problem: Pain  Goal: Verbalizes/displays adequate comfort level or baseline comfort level  Outcome: Progressing     Problem: Skin/Tissue Integrity  Goal: Skin integrity remains intact  Description: 1.  Monitor for areas of redness and/or skin breakdown  2.  Assess vascular access sites hourly  3.  Every 4-6 hours minimum:  Change oxygen saturation probe site  4.  Every 4-6 hours:  If on nasal continuous positive airway pressure, respiratory therapy assess nares and determine need for appliance change or resting period  Outcome: Progressing     Problem: ABCDS Injury Assessment  Goal: Absence of physical injury  Outcome: Progressing     Problem: Safety - Adult  Goal: Free from fall injury  Outcome: Progressing     Problem: Nutrition Deficit:  Goal: Optimize nutritional status  Outcome: Progressing

## 2025-02-16 ENCOUNTER — APPOINTMENT (OUTPATIENT)
Dept: ULTRASOUND IMAGING | Age: 55
DRG: 871 | End: 2025-02-16
Payer: COMMERCIAL

## 2025-02-16 LAB
ALBUMIN SERPL-MCNC: 2 G/DL (ref 3.5–5.2)
ALP SERPL-CCNC: 208 U/L (ref 35–104)
ALT SERPL-CCNC: 32 U/L (ref 0–32)
ANION GAP SERPL CALCULATED.3IONS-SCNC: 11 MMOL/L (ref 7–16)
AST SERPL-CCNC: 33 U/L (ref 0–31)
BILIRUB DIRECT SERPL-MCNC: <0.2 MG/DL (ref 0–0.3)
BILIRUB INDIRECT SERPL-MCNC: ABNORMAL MG/DL (ref 0–1)
BILIRUB SERPL-MCNC: 0.4 MG/DL (ref 0–1.2)
BUN SERPL-MCNC: 16 MG/DL (ref 6–20)
CALCIUM SERPL-MCNC: 7.7 MG/DL (ref 8.6–10.2)
CHLORIDE SERPL-SCNC: 110 MMOL/L (ref 98–107)
CO2 SERPL-SCNC: 22 MMOL/L (ref 22–29)
CREAT SERPL-MCNC: 0.6 MG/DL (ref 0.5–1)
GFR, ESTIMATED: >90 ML/MIN/1.73M2
GLUCOSE SERPL-MCNC: 73 MG/DL (ref 74–99)
MAGNESIUM SERPL-MCNC: 1.9 MG/DL (ref 1.6–2.6)
MICROORGANISM SPEC CULT: ABNORMAL
MICROORGANISM SPEC CULT: ABNORMAL
PHOSPHATE SERPL-MCNC: 1.4 MG/DL (ref 2.5–4.5)
POTASSIUM SERPL-SCNC: 3.3 MMOL/L (ref 3.5–5)
PROT SERPL-MCNC: 4.5 G/DL (ref 6.4–8.3)
SERVICE CMNT-IMP: ABNORMAL
SODIUM SERPL-SCNC: 143 MMOL/L (ref 132–146)
SPECIMEN DESCRIPTION: ABNORMAL

## 2025-02-16 PROCEDURE — 36415 COLL VENOUS BLD VENIPUNCTURE: CPT

## 2025-02-16 PROCEDURE — 99232 SBSQ HOSP IP/OBS MODERATE 35: CPT | Performed by: INTERNAL MEDICINE

## 2025-02-16 PROCEDURE — 93971 EXTREMITY STUDY: CPT

## 2025-02-16 PROCEDURE — 2500000003 HC RX 250 WO HCPCS: Performed by: INTERNAL MEDICINE

## 2025-02-16 PROCEDURE — 82248 BILIRUBIN DIRECT: CPT

## 2025-02-16 PROCEDURE — 6360000002 HC RX W HCPCS: Performed by: INTERNAL MEDICINE

## 2025-02-16 PROCEDURE — 83735 ASSAY OF MAGNESIUM: CPT

## 2025-02-16 PROCEDURE — 6370000000 HC RX 637 (ALT 250 FOR IP): Performed by: FAMILY MEDICINE

## 2025-02-16 PROCEDURE — 2060000000 HC ICU INTERMEDIATE R&B

## 2025-02-16 PROCEDURE — 80053 COMPREHEN METABOLIC PANEL: CPT

## 2025-02-16 PROCEDURE — 2500000003 HC RX 250 WO HCPCS: Performed by: FAMILY MEDICINE

## 2025-02-16 PROCEDURE — 2580000003 HC RX 258: Performed by: INTERNAL MEDICINE

## 2025-02-16 PROCEDURE — 84100 ASSAY OF PHOSPHORUS: CPT

## 2025-02-16 RX ORDER — POTASSIUM CHLORIDE 7.45 MG/ML
10 INJECTION INTRAVENOUS
Status: DISPENSED | OUTPATIENT
Start: 2025-02-16 | End: 2025-02-16

## 2025-02-16 RX ORDER — POTASSIUM CHLORIDE 1500 MG/1
20 TABLET, EXTENDED RELEASE ORAL 2 TIMES DAILY
Status: DISCONTINUED | OUTPATIENT
Start: 2025-02-16 | End: 2025-02-16

## 2025-02-16 RX ADMIN — SODIUM CHLORIDE, PRESERVATIVE FREE 10 ML: 5 INJECTION INTRAVENOUS at 20:04

## 2025-02-16 RX ADMIN — MIRTAZAPINE 15 MG: 15 TABLET, FILM COATED ORAL at 20:03

## 2025-02-16 RX ADMIN — MEROPENEM 1000 MG: 1 INJECTION INTRAVENOUS at 05:24

## 2025-02-16 RX ADMIN — OXYCODONE HYDROCHLORIDE AND ACETAMINOPHEN 500 MG: 500 TABLET ORAL at 20:03

## 2025-02-16 RX ADMIN — MEROPENEM 1000 MG: 1 INJECTION INTRAVENOUS at 20:11

## 2025-02-16 RX ADMIN — Medication 5 MG: at 20:03

## 2025-02-16 RX ADMIN — SODIUM CHLORIDE, PRESERVATIVE FREE 10 ML: 5 INJECTION INTRAVENOUS at 08:44

## 2025-02-16 RX ADMIN — MEROPENEM 1000 MG: 1 INJECTION INTRAVENOUS at 17:03

## 2025-02-16 RX ADMIN — DOCUSATE SODIUM 100 MG: 100 CAPSULE, LIQUID FILLED ORAL at 20:03

## 2025-02-16 RX ADMIN — POTASSIUM PHOSPHATE, MONOBASIC POTASSIUM PHOSPHATE, DIBASIC 30 MMOL: 224; 236 INJECTION, SOLUTION, CONCENTRATE INTRAVENOUS at 18:46

## 2025-02-16 NOTE — DISCHARGE INSTRUCTIONS
Call upon discharge to schedule a follow-up visit with Steve Eugene/Nakul/Tabatha/Pat ([facility]rology) at [phone]

## 2025-02-16 NOTE — PROGRESS NOTES
[facility] Hospitalist   Progress Note    Admitting Date and Time: 2/13/2025  1:49 PM  Admit Dx: Kidney stone [N20.0]  Bladder stone [N21.0]  Septicemia (HCC) [A41.9]  AMBROCIO (acute kidney injury) (HCC) [N17.9]  Sepsis due to urinary tract infection (HCC) [A41.9, N39.0]  Urinary tract infection with hematuria, site unspecified [N39.0, R31.9]  Constipation, unspecified constipation type [K59.00]    Subjective/interval history:    2/14: Pt admitted from nursing facility yesterday evening with sepsis due to urinary tract infection with septic encephalopathy.  CT of the abdomen pelvis also significant for staghorn calculi.    This morning she is awake, alert, oriented x 3 but intermittently confused.  Family at bedside and note that this is not her baseline mentation.    Vital signs significant for tachycardia and blood pressure is borderline but she is not toxic appearing.    2/15: Patient is awake, alert, oriented x 3.  Her mentation seems to be improving.  Taken the OR yesterday for cystoscopy with attempted stent placement, however ureteral stones precluded successful stent placement.  Per nephrology may need referral to [facility] for percutaneous nephrolithotomy, and will require percutaneous nephrostomy tube here.  I discussed with the patient that she may need further procedures inpatient versus outpatient at outside facility, she notes that she does not want to consent to any procedures at this time.  I mended she take some time to think about it and discuss with her family.  Urine culture is now showing ESBL producing E. coli and Pseudomonas aeruginosa.    2/16: Patient has significant right upper extremity edema.  Midline was removed from this arm.  Patient notes that she does not wish to have further procedures done.    ROS: denies fever, chills, cp, sob, n/v, HA unless stated above.     meropenem  1,000 mg IntraVENous Q8H    [Held by provider] atorvastatin  40 mg Oral Nightly    calcium  see below  -Blood cultures no growth to date however urine cultures are growing Pseudomonas aeruginosa and ESBL producing E. coli  -Ceftriaxone was changed to meropenem 2/15 and infectious disease consulted for further antimicrobial management    Lactic acidosis/high anion gap metabolic acidosis and normal anion gap metabolic acidosis  -BMP this morning pending  -After fluid boluses, received 2 L D5-0.45% NaCl with 75 mEq NaHCO3 at 100 mL/h for 2 L.  Metabolic acidosis resolved and now hemodynamically stable    Hypophosphatemia  Hypokalemia  -Will give IV potassium phosphate replacement along with p.o. potassium bicarbonate replacement    Staghorn calculi  -Urology consulted and took patient to OR 2/14 for cystoscopy with possible stent placement.  Ureteral stones precluded stent placement.  She was furred to outpatient urology at [facility].  In the meantime, percutaneous nephrostomy tube ordered to be done here however patient notes that she does not wish to have any further procedures done at this time.  Today her mentation appears normal and I believe that she has decision-making capacity at this time    Transaminitis  -Most likely due to sepsis  -She is improving.  Can discontinue daily LFTs    Major depressive disorder  -Continue sertraline    Dyslipidemia  -Hold atorvastatin for now given transaminitis    DVT prophylaxis: Enoxaparin  CODE STATUS: Full    Disposition: Anticipate 2-3 more days inpatient treatment and monitoring prior to discharge     NOTE: This report was transcribed using voice recognition software. Every effort was made to ensure accuracy; however, inadvertent computerized transcription errors may be present.     Electronically signed by Wili Walker DO on 2/16/2025 at 8:07 AM

## 2025-02-16 NOTE — PROGRESS NOTES
Patient - Brigid Moses,  Age - 55 y.o.    - 1970      Room Number - 0518/0518-01   N -  [id number]   [facility] # - [id number]  Date of Admission -  2025  1:49 PM    Problem list of patient:     Hospital Problems             Last Modified POA    * (Principal) Sepsis due to urinary tract infection (HCC) 2025 Yes    Hemiplegia and hemiparesis following cerebral infarction affecting left non-dominant side (HCC) 2025 Yes    Major depressive disorder, recurrent (HCC) 2025 Yes    Constipation 2025 Yes    Metabolic acidosis 2025 Yes     ASSESSMENT/PLAN   Leukocytosis   Complicated uti Psedomonas/ESBL E coli  Extensive bilateral staghorn like calculi. With staghorn pt should be on suppressive atb unfortunately with current cx no oral  suppressive available   CYSTOSCOPY left retrograde pyelogram, attempted stent placement   Urology recommendation for bilateral nephrostomy tube placement for eventual PCNL   Rue edema if cont suggest us rue   Has purwick   Cont meropenem   Med rec  Will need picc for  copat 14 days   Check labs in am       Infection Control Recommendations   [place] Precautions  Feliz Catheter  Central Line  Wound care        Coordination of Outpatient Care:   Estimated Length of  antimicrobials: 2 weeks   Patient will need Midline Catheter Insertion       Please note that 30 minutes were spent on this case in regards to the intensity and complexity inherent to hospital inpatient or observation care associated with a confirmed or suspected infectious disease.  This included education to patient/representative and/for hospital staff in regards to disease transmission risk assessment and mitigation, public health investigation, analysis and testing, and complex antimicrobial therapy counseling and treatment.    SUBJECTIVE:   DOS:  25 afebrile tachy bp stable RA in bed feels col no issues     OBJECTIVE

## 2025-02-16 NOTE — PROGRESS NOTES
2/16/2025 9:22 AM  Brigid Moses  [id number]    Subjective: Patient resting in bed soundly this morning.  She denies any complaints currently.  She states that she had the surgical findings explained to her already.      Scheduled Meds:   meropenem  1,000 mg IntraVENous Q8H    [Held by provider] atorvastatin  40 mg Oral Nightly    calcium carbonate  2 tablet Oral Daily    Vitamin D  2,000 Units Oral Daily    docusate sodium  100 mg Oral BID    ferrous sulfate  325 mg Oral Daily with breakfast    melatonin  5 mg Oral Nightly    mirtazapine  15 mg Oral Nightly    therapeutic multivitamin-minerals  1 tablet Oral Daily    polyethylene glycol  17 g Oral Daily    sertraline  25 mg Oral Daily    vitamin C  500 mg Oral BID    zinc sulfate  50 mg Oral Daily    sodium chloride flush  5-40 mL IntraVENous 2 times per day    enoxaparin  30 mg SubCUTAneous Daily       Objective:  Vitals:    02/16/25 0736   BP: (!) 113/94   Pulse: (!) 110   Resp: 18   Temp:    SpO2: 100%         Allergies: Bactrim [sulfamethoxazole-trimethoprim]    General Appearance: alert and oriented to person, place and time and in no acute distress  Skin: no rash or erythema  Head: normocephalic and atraumatic  Pulmonary/Chest: normal air movement, no respiratory distress  Abdomen: soft, non-tender, non-distended  Genitourinary: No urine at bedside for examination  Extremities: no cyanosis, clubbing or edema         Labs:     Recent Labs     02/14/25  0639      K 3.5   *   CO2 15*   BUN 25*   CREATININE 0.8   GLUCOSE 94   CALCIUM 7.0*       Lab Results   Component Value Date/Time    HGB 12.0 02/14/2025 06:39 AM    HCT 36.5 02/14/2025 06:39 AM       No results found for: \"PSA\"      Assessment/Plan:  Bilateral staghorn calculi  Left-sided ureteral calculi    Postop day 2 from attempted bilateral ureteral stent placement.  I was unable to place the stents given inability to pass a wire from below.    Discussed with patient recommendation for

## 2025-02-17 LAB
ANION GAP SERPL CALCULATED.3IONS-SCNC: 11 MMOL/L (ref 7–16)
BUN SERPL-MCNC: 14 MG/DL (ref 6–20)
CALCIUM SERPL-MCNC: 7.5 MG/DL (ref 8.6–10.2)
CHLORIDE SERPL-SCNC: 113 MMOL/L (ref 98–107)
CO2 SERPL-SCNC: 19 MMOL/L (ref 22–29)
CREAT SERPL-MCNC: 0.6 MG/DL (ref 0.5–1)
GFR, ESTIMATED: >90 ML/MIN/1.73M2
GLUCOSE SERPL-MCNC: 75 MG/DL (ref 74–99)
INR PPP: 1
MAGNESIUM SERPL-MCNC: 1.8 MG/DL (ref 1.6–2.6)
PHOSPHATE SERPL-MCNC: 3.1 MG/DL (ref 2.5–4.5)
POTASSIUM SERPL-SCNC: 4.4 MMOL/L (ref 3.5–5)
PROTHROMBIN TIME: 10.9 SEC (ref 9.3–12.4)
SODIUM SERPL-SCNC: 143 MMOL/L (ref 132–146)

## 2025-02-17 PROCEDURE — 99232 SBSQ HOSP IP/OBS MODERATE 35: CPT | Performed by: FAMILY MEDICINE

## 2025-02-17 PROCEDURE — 85610 PROTHROMBIN TIME: CPT

## 2025-02-17 PROCEDURE — 2500000003 HC RX 250 WO HCPCS: Performed by: SPECIALIST

## 2025-02-17 PROCEDURE — 6360000002 HC RX W HCPCS: Performed by: FAMILY MEDICINE

## 2025-02-17 PROCEDURE — 6360000002 HC RX W HCPCS: Performed by: INTERNAL MEDICINE

## 2025-02-17 PROCEDURE — 2500000003 HC RX 250 WO HCPCS: Performed by: FAMILY MEDICINE

## 2025-02-17 PROCEDURE — 84100 ASSAY OF PHOSPHORUS: CPT

## 2025-02-17 PROCEDURE — 83735 ASSAY OF MAGNESIUM: CPT

## 2025-02-17 PROCEDURE — 2580000003 HC RX 258: Performed by: INTERNAL MEDICINE

## 2025-02-17 PROCEDURE — 6370000000 HC RX 637 (ALT 250 FOR IP): Performed by: FAMILY MEDICINE

## 2025-02-17 PROCEDURE — 36415 COLL VENOUS BLD VENIPUNCTURE: CPT

## 2025-02-17 PROCEDURE — 2060000000 HC ICU INTERMEDIATE R&B

## 2025-02-17 PROCEDURE — 80048 BASIC METABOLIC PNL TOTAL CA: CPT

## 2025-02-17 RX ORDER — MAGNESIUM SULFATE 1 G/100ML
1000 INJECTION INTRAVENOUS ONCE
Status: COMPLETED | OUTPATIENT
Start: 2025-02-17 | End: 2025-02-17

## 2025-02-17 RX ORDER — SODIUM CHLORIDE 9 MG/ML
INJECTION, SOLUTION INTRAVENOUS PRN
Status: DISCONTINUED | OUTPATIENT
Start: 2025-02-17 | End: 2025-02-19 | Stop reason: HOSPADM

## 2025-02-17 RX ORDER — SODIUM CHLORIDE 0.9 % (FLUSH) 0.9 %
5-40 SYRINGE (ML) INJECTION PRN
Status: DISCONTINUED | OUTPATIENT
Start: 2025-02-17 | End: 2025-02-19 | Stop reason: HOSPADM

## 2025-02-17 RX ORDER — LIDOCAINE HYDROCHLORIDE 10 MG/ML
50 INJECTION, SOLUTION EPIDURAL; INFILTRATION; INTRACAUDAL; PERINEURAL ONCE
Status: DISCONTINUED | OUTPATIENT
Start: 2025-02-17 | End: 2025-02-19 | Stop reason: HOSPADM

## 2025-02-17 RX ORDER — SODIUM CHLORIDE 0.9 % (FLUSH) 0.9 %
5-40 SYRINGE (ML) INJECTION EVERY 12 HOURS SCHEDULED
Status: DISCONTINUED | OUTPATIENT
Start: 2025-02-17 | End: 2025-02-19 | Stop reason: HOSPADM

## 2025-02-17 RX ADMIN — SERTRALINE HYDROCHLORIDE 25 MG: 50 TABLET ORAL at 08:11

## 2025-02-17 RX ADMIN — Medication 2000 UNITS: at 08:11

## 2025-02-17 RX ADMIN — MAGNESIUM SULFATE 1000 MG: 1 INJECTION INTRAVENOUS at 14:53

## 2025-02-17 RX ADMIN — MEROPENEM 1000 MG: 1 INJECTION INTRAVENOUS at 21:32

## 2025-02-17 RX ADMIN — OXYCODONE HYDROCHLORIDE AND ACETAMINOPHEN 500 MG: 500 TABLET ORAL at 08:11

## 2025-02-17 RX ADMIN — DOCUSATE SODIUM 100 MG: 100 CAPSULE, LIQUID FILLED ORAL at 08:11

## 2025-02-17 RX ADMIN — MEROPENEM 1000 MG: 1 INJECTION INTRAVENOUS at 05:06

## 2025-02-17 RX ADMIN — POTASSIUM CHLORIDE 10 MEQ: 7.46 INJECTION, SOLUTION INTRAVENOUS at 01:37

## 2025-02-17 RX ADMIN — CALCIUM CARBONATE 1000 MG: 500 TABLET, CHEWABLE ORAL at 08:19

## 2025-02-17 RX ADMIN — OXYCODONE HYDROCHLORIDE AND ACETAMINOPHEN 500 MG: 500 TABLET ORAL at 21:30

## 2025-02-17 RX ADMIN — ZINC SULFATE 220 MG (50 MG) CAPSULE 50 MG: CAPSULE at 08:11

## 2025-02-17 RX ADMIN — SODIUM CHLORIDE, PRESERVATIVE FREE 10 ML: 5 INJECTION INTRAVENOUS at 08:12

## 2025-02-17 RX ADMIN — SODIUM CHLORIDE, PRESERVATIVE FREE 10 ML: 5 INJECTION INTRAVENOUS at 21:35

## 2025-02-17 RX ADMIN — Medication 5 MG: at 21:30

## 2025-02-17 RX ADMIN — POLYETHYLENE GLYCOL 3350 17 G: 17 POWDER, FOR SOLUTION ORAL at 08:11

## 2025-02-17 RX ADMIN — Medication 1 TABLET: at 08:11

## 2025-02-17 RX ADMIN — MEROPENEM 1000 MG: 1 INJECTION INTRAVENOUS at 14:00

## 2025-02-17 RX ADMIN — MIRTAZAPINE 15 MG: 15 TABLET, FILM COATED ORAL at 21:35

## 2025-02-17 NOTE — PLAN OF CARE
Problem: Chronic Conditions and Co-morbidities  Goal: Patient's chronic conditions and co-morbidity symptoms are monitored and maintained or improved  Outcome: Progressing     Problem: Discharge Planning  Goal: Discharge to home or other facility with appropriate resources  Outcome: Progressing     Problem: Pain  Goal: Verbalizes/displays adequate comfort level or baseline comfort level  Outcome: Progressing     Problem: Skin/Tissue Integrity  Goal: Skin integrity remains intact  Description: 1.  Monitor for areas of redness and/or skin breakdown  2.  Assess vascular access sites hourly  3.  Every 4-6 hours minimum:  Change oxygen saturation probe site  4.  Every 4-6 hours:  If on nasal continuous positive airway pressure, respiratory therapy assess nares and determine need for appliance change or resting period  Outcome: Progressing     Problem: Safety - Adult  Goal: Free from fall injury  Outcome: Progressing     Problem: Nutrition Deficit:  Goal: Optimize nutritional status  Outcome: Progressing

## 2025-02-17 NOTE — CARE COORDINATION
2/17/25 1312 CM note: covid/flu (-), bl cx 2/13/25 ngtd, urine cx (+) 2/13/25. IV merrem. PICC order noted. CT abd- extensive amauri staghorn like calculi and 5 mm and 3 mm calculi in the L proximal to mid ureter and 4 mm calculus L mid ureter. Plan for IR for amauri nephrostomy tube placement today. S/p CYSTOSCOPY left retrograde pyelogram, attempted stent placement 2/14/25. Per uro will require amauri PCNL's for stone clearance.  This likely will require referral over to [facility]. Room air. Urology, ID, and PT/OT following. Hx CVA w/ hemiplegia & hemiparesis. Pt came to us from [facility] and would like to return to this facility at discharge. Per [facility] liaison, pt is LTC at their facility, if she has a therapy/skilled need can bring back precert pending. WILL NEED SIGNED ENRIQUE AND DC ORDER. CM will follow. Electronically signed by Kasey Rocha RN on 2/17/2025 at 1:18 PM

## 2025-02-17 NOTE — PLAN OF CARE
Problem: Chronic Conditions and Co-morbidities  Goal: Patient's chronic conditions and co-morbidity symptoms are monitored and maintained or improved  2/17/2025 1641 by Rina Bradley RN  Outcome: Progressing  2/17/2025 1641 by Rina Bradley RN  Outcome: Progressing     Problem: Discharge Planning  Goal: Discharge to home or other facility with appropriate resources  2/17/2025 1641 by Rina Bradley RN  Outcome: Progressing  2/17/2025 1641 by Rina Bradley RN  Outcome: Progressing     Problem: Pain  Goal: Verbalizes/displays adequate comfort level or baseline comfort level  2/17/2025 1641 by Rina Bradley RN  Outcome: Progressing  2/17/2025 1641 by Rina Bradley RN  Outcome: Progressing     Problem: Skin/Tissue Integrity  Goal: Skin integrity remains intact  Description: 1.  Monitor for areas of redness and/or skin breakdown  2.  Assess vascular access sites hourly  3.  Every 4-6 hours minimum:  Change oxygen saturation probe site  4.  Every 4-6 hours:  If on nasal continuous positive airway pressure, respiratory therapy assess nares and determine need for appliance change or resting period  Outcome: Progressing     Problem: ABCDS Injury Assessment  Goal: Absence of physical injury  Outcome: Progressing     Problem: Safety - Adult  Goal: Free from fall injury  Outcome: Progressing     Problem: Nutrition Deficit:  Goal: Optimize nutritional status  Outcome: Progressing

## 2025-02-17 NOTE — PROGRESS NOTES
[facility] Hospitalist Progress Note    Admitting Date and Time: 2/13/2025  1:49 PM  Admit Dx: Kidney stone [N20.0]  Bladder stone [N21.0]  Septicemia (HCC) [A41.9]  AMBROCIO (acute kidney injury) (HCC) [N17.9]  Sepsis due to urinary tract infection (HCC) [A41.9, N39.0]  Urinary tract infection with hematuria, site unspecified [N39.0, R31.9]  Constipation, unspecified constipation type [K59.00]      Assessment:    Principal Problem:    Sepsis due to urinary tract infection (HCC)  Active Problems:    Hemiplegia and hemiparesis following cerebral infarction affecting left non-dominant side (HCC)    Major depressive disorder, recurrent (HCC)    Constipation    Metabolic acidosis  Resolved Problems:    * No resolved hospital problems. *      Plan:  2/13/2025  Sepsis  UTI  Staghorn calculus  Lactic acidosis  Hypokalemia  - sepsis bolus given in ED  - Rocephin  - f/u culture results  - urology c/s  - potassium replaced in ED  - repeat lactic acid     Hx CVA  Left hemiplegia  - continue home statin     Constipation  - bowel regimen     Depression  - continue home psych med     2/14/2025  Sepsis with septic encephalopathy present on admission due to urinary tract infection  -As evidenced by tachycardia, lactic acidosis, UA strongly suggestive of UTI  -Continue IV fluids, see below  -Blood cultures and urine cultures in process  -Increase ceftriaxone to 2 g IV daily.  Adjust/de-escalate based on culture results     Lactic acidosis/high anion gap metabolic acidosis and normal anion gap metabolic acidosis  -Lactic acidosis has resolved and she still has significant metabolic acidosis on BMP with bicarbonate of 15 this morning.  Consistent with mixed process  -Change IV fluids to D5-0.45% NaCl with 75 mEq NaHCO3 at 100 mL/h for 2 L     Staghorn calculi  -Urology consult     Transaminitis  -Most likely due to sepsis  -Trend LFTs daily for now     Major depressive disorder  -Continue sertraline     Dyslipidemia  -Continue  (HCC) [A41.9]  AMBROCIO (acute kidney injury) (HCC) [N17.9]  Sepsis due to urinary tract infection (HCC) [A41.9, N39.0]  Urinary tract infection with hematuria, site unspecified [N39.0, R31.9]  Constipation, unspecified constipation type [K59.00]   The patient is seen at bedside during rounds.  She has no acute complaints at this time.  She has been n.p.o. since midnight for procedure.  There is a TeleSitter at bedside.  No family present.  No acute events overnight.  All questions and concerns were addressed during rounds.    ROS: denies fever, chills, cp, sob, n/v, HA unless stated above.      sodium chloride flush  5-40 mL IntraVENous 2 times per day    lidocaine 1 % injection  50 mg IntraDERmal Once    meropenem  1,000 mg IntraVENous Q8H    [Held by provider] atorvastatin  40 mg Oral Nightly    calcium carbonate  2 tablet Oral Daily    Vitamin D  2,000 Units Oral Daily    docusate sodium  100 mg Oral BID    ferrous sulfate  325 mg Oral Daily with breakfast    melatonin  5 mg Oral Nightly    mirtazapine  15 mg Oral Nightly    therapeutic multivitamin-minerals  1 tablet Oral Daily    polyethylene glycol  17 g Oral Daily    sertraline  25 mg Oral Daily    vitamin C  500 mg Oral BID    zinc sulfate  50 mg Oral Daily    sodium chloride flush  5-40 mL IntraVENous 2 times per day    enoxaparin  30 mg SubCUTAneous Daily     sodium chloride flush, 5-40 mL, PRN  sodium chloride, , PRN  bisacodyl, 10 mg, Daily PRN  sodium chloride flush, 5-40 mL, PRN  acetaminophen, 650 mg, Q6H PRN   Or  acetaminophen, 650 mg, Q6H PRN         Objective:    /77   Pulse (!) 124   Temp 98.7 °F (37.1 °C)   Resp 27   Ht 1.577 m (5' 2.09\")   Wt 45.8 kg (101 lb)   LMP  (LMP Unknown)   SpO2 100%   BMI 18.42 kg/m²   General Appearance: Appropriately responsive, chronically ill-appearing  Skin: warm and dry  Head: normocephalic and atraumatic  Eyes: pupils equal, round, and reactive to light, extraocular eye movements intact, conjunctivae

## 2025-02-17 NOTE — PROGRESS NOTES
Spiritual Health History and Assessment/Progress Note  [facility]    Initial Encounter, Spiritual/Emotional Needs,  ,  ,      Name: Brigid Moses MRN: [id number]    Age: 55 y.o.     Sex: female   Language: English   Mandaeism: Evangelical   Sepsis due to urinary tract infection (HCC)     Date: 2/17/2025                           Spiritual Assessment began in [facility] PIC/ICU        Referral/Consult From: Rounding   Encounter Overview/Reason: Initial Encounter, Spiritual/Emotional Needs  Service Provided For: Patient    Kelsey, Belief, Meaning:   Patient is connected with a kelsey tradition or spiritual practice  Family/Friends No family/friends present      Importance and Influence:  Patient has spiritual/personal beliefs that influence decisions regarding their health  Family/Friends No family/friends present    Community:  Patient feels well-supported. Support system includes: Unknown  Family/Friends No family/friends present    Assessment and Plan of Care:     Patient Interventions include: Engaged in theological reflection  Family/Friends Interventions include: No family/friends present    Patient Plan of Care: Spiritual Care available upon further referral  Family/Friends Plan of Care: No family/friends present    Electronically signed by Chaplain Amanda on 2/17/2025 at 12:56 PM

## 2025-02-17 NOTE — PROGRESS NOTES
2/17/2025 10:22 AM  Brigid Moses  [id number]    Subjective:    She is sleeping in bed  Arouses to verbal stimuli  Does not feel great this morning  She is to go for bilateral nephrostomy tubes today with IR    Review of Systems  Constitutional: No fever or chills   Respiratory: negative for cough and hemoptysis  Cardiovascular: negative for chest pain and dyspnea  Gastrointestinal: negative for abdominal pain, diarrhea, nausea and vomiting   : See above  Derm: negative for rash and skin lesion(s)  Neurological: negative for seizures and tremors  Musculoskeletal: Negative    Psychiatric: Negative   All other reviews are negative      Scheduled Meds:   meropenem  1,000 mg IntraVENous Q8H    [Held by provider] atorvastatin  40 mg Oral Nightly    calcium carbonate  2 tablet Oral Daily    Vitamin D  2,000 Units Oral Daily    docusate sodium  100 mg Oral BID    ferrous sulfate  325 mg Oral Daily with breakfast    melatonin  5 mg Oral Nightly    mirtazapine  15 mg Oral Nightly    therapeutic multivitamin-minerals  1 tablet Oral Daily    polyethylene glycol  17 g Oral Daily    sertraline  25 mg Oral Daily    vitamin C  500 mg Oral BID    zinc sulfate  50 mg Oral Daily    sodium chloride flush  5-40 mL IntraVENous 2 times per day    enoxaparin  30 mg SubCUTAneous Daily       Objective:  Vitals:    02/17/25 0808   BP: 121/77   Pulse: (!) 124   Resp:    Temp: 98.7 °F (37.1 °C)   SpO2: 100%         Allergies: Bactrim [sulfamethoxazole-trimethoprim]    General Appearance: alert and oriented to person, place and time and in no acute distress  Skin: no rash or erythema  Head: normocephalic and atraumatic  Pulmonary/Chest: normal air movement, no respiratory distress  Abdomen: soft, non-tender, non-distended  Genitourinary: No Feliz  Extremities: no cyanosis, clubbing or edema         Labs:     Recent Labs     02/17/25  0718      K 4.4   *   CO2 19*   BUN 14   CREATININE 0.6   GLUCOSE 75   CALCIUM 7.5*       Lab

## 2025-02-17 NOTE — PROGRESS NOTES
Patient - Brigid Moses,  Age - 55 y.o.    - 1970      Room Number - 0518/0518-01   MRN -  [id number]   [facility] # - [id number]  Date of Admission -  2025  1:49 PM    Problem list of patient:     Hospital Problems             Last Modified POA    * (Principal) Sepsis due to urinary tract infection (HCC) 2025 Yes    Hemiplegia and hemiparesis following cerebral infarction affecting left non-dominant side (HCC) 2025 Yes    Major depressive disorder, recurrent (HCC) 2025 Yes    Constipation 2025 Yes    Metabolic acidosis 2025 Yes     ASSESSMENT/PLAN   Leukocytosis   Complicated uti Psedomonas/ESBL E coli  Extensive bilateral staghorn like calculi. With staghorn pt should be on suppressive atb unfortunately with current cx no oral  suppressive available   CYSTOSCOPY left retrograde pyelogram, attempted stent placement   Urology recommendation for bilateral nephrostomy tube placement for eventual PCNL   Rue edema if cont suggest us rue   Has purwick   Cont meropenem   Med rec  Will need picc for  copat 14 days   Check labs in am cbc      Infection Control Recommendations   [place] Precautions  [place] Catheter  Central Line  Wound care        Coordination of Outpatient Care:   Estimated Length of  antimicrobials: 2 weeks   Patient will need Midline Catheter Insertion          SUBJECTIVE:   DOS:  25 afebrile tachy getting blood draw cr0.6    afebrile tachy bp stable RA in bed feels col no issues     OBJECTIVE   VITALS    height is 1.577 m (5' 2.09\") and weight is 45.8 kg (101 lb). Her temperature is 98.7 °F (37.1 °C). Her blood pressure is 121/77 and her pulse is 124 (abnormal). Her respiration is 27 and oxygen saturation is 100%.       General Appearance:    in bed    HEENT:    at/nc  Lungs:    cta ant B   Cardiovascular:  s1/s2  Abdomen:   soft nt /nd  :   External pollack   Neurologic:   nad no focal deficits

## 2025-02-18 ENCOUNTER — APPOINTMENT (OUTPATIENT)
Dept: INTERVENTIONAL RADIOLOGY/VASCULAR | Age: 55
DRG: 871 | End: 2025-02-18
Payer: COMMERCIAL

## 2025-02-18 VITALS
SYSTOLIC BLOOD PRESSURE: 102 MMHG | OXYGEN SATURATION: 96 % | HEART RATE: 77 BPM | TEMPERATURE: 97.9 F | BODY MASS INDEX: 18.58 KG/M2 | RESPIRATION RATE: 19 BRPM | DIASTOLIC BLOOD PRESSURE: 80 MMHG | HEIGHT: 62 IN | WEIGHT: 101 LBS

## 2025-02-18 LAB
ALBUMIN SERPL-MCNC: 1.9 G/DL (ref 3.5–5.2)
ALP SERPL-CCNC: 180 U/L (ref 35–104)
ALT SERPL-CCNC: 27 U/L (ref 0–32)
ANION GAP SERPL CALCULATED.3IONS-SCNC: 9 MMOL/L (ref 7–16)
AST SERPL-CCNC: 30 U/L (ref 0–31)
BASOPHILS # BLD: 0.04 K/UL (ref 0–0.2)
BASOPHILS NFR BLD: 0 % (ref 0–2)
BILIRUB SERPL-MCNC: 0.4 MG/DL (ref 0–1.2)
BUN SERPL-MCNC: 14 MG/DL (ref 6–20)
CALCIUM SERPL-MCNC: 7.7 MG/DL (ref 8.6–10.2)
CHLORIDE SERPL-SCNC: 115 MMOL/L (ref 98–107)
CO2 SERPL-SCNC: 21 MMOL/L (ref 22–29)
CREAT SERPL-MCNC: 0.6 MG/DL (ref 0.5–1)
EOSINOPHIL # BLD: 0.24 K/UL (ref 0.05–0.5)
EOSINOPHILS RELATIVE PERCENT: 2 % (ref 0–6)
ERYTHROCYTE [DISTWIDTH] IN BLOOD BY AUTOMATED COUNT: 16.8 % (ref 11.5–15)
GFR, ESTIMATED: >90 ML/MIN/1.73M2
GLUCOSE SERPL-MCNC: 80 MG/DL (ref 74–99)
HCT VFR BLD AUTO: 32.5 % (ref 34–48)
HGB BLD-MCNC: 10.9 G/DL (ref 11.5–15.5)
IMM GRANULOCYTES # BLD AUTO: 0.17 K/UL (ref 0–0.58)
IMM GRANULOCYTES NFR BLD: 2 % (ref 0–5)
LYMPHOCYTES NFR BLD: 2.24 K/UL (ref 1.5–4)
LYMPHOCYTES RELATIVE PERCENT: 22 % (ref 20–42)
MAGNESIUM SERPL-MCNC: 2.1 MG/DL (ref 1.6–2.6)
MCH RBC QN AUTO: 28.6 PG (ref 26–35)
MCHC RBC AUTO-ENTMCNC: 33.5 G/DL (ref 32–34.5)
MCV RBC AUTO: 85.3 FL (ref 80–99.9)
MICROORGANISM SPEC CULT: NORMAL
MICROORGANISM SPEC CULT: NORMAL
MONOCYTES NFR BLD: 0.95 K/UL (ref 0.1–0.95)
MONOCYTES NFR BLD: 9 % (ref 2–12)
NEUTROPHILS NFR BLD: 64 % (ref 43–80)
NEUTS SEG NFR BLD: 6.45 K/UL (ref 1.8–7.3)
PHOSPHATE SERPL-MCNC: 1.9 MG/DL (ref 2.5–4.5)
PLATELET # BLD AUTO: 348 K/UL (ref 130–450)
PMV BLD AUTO: 8.3 FL (ref 7–12)
POTASSIUM SERPL-SCNC: 3.9 MMOL/L (ref 3.5–5)
PROT SERPL-MCNC: 4.3 G/DL (ref 6.4–8.3)
RBC # BLD AUTO: 3.81 M/UL (ref 3.5–5.5)
SERVICE CMNT-IMP: NORMAL
SERVICE CMNT-IMP: NORMAL
SODIUM SERPL-SCNC: 145 MMOL/L (ref 132–146)
SPECIMEN DESCRIPTION: NORMAL
SPECIMEN DESCRIPTION: NORMAL
WBC OTHER # BLD: 10.1 K/UL (ref 4.5–11.5)

## 2025-02-18 PROCEDURE — 6360000002 HC RX W HCPCS: Performed by: INTERNAL MEDICINE

## 2025-02-18 PROCEDURE — 6360000002 HC RX W HCPCS: Performed by: FAMILY MEDICINE

## 2025-02-18 PROCEDURE — 02HV33Z INSERTION OF INFUSION DEVICE INTO SUPERIOR VENA CAVA, PERCUTANEOUS APPROACH: ICD-10-PCS | Performed by: FAMILY MEDICINE

## 2025-02-18 PROCEDURE — 2500000003 HC RX 250 WO HCPCS: Performed by: FAMILY MEDICINE

## 2025-02-18 PROCEDURE — 2580000003 HC RX 258: Performed by: INTERNAL MEDICINE

## 2025-02-18 PROCEDURE — 85025 COMPLETE CBC W/AUTO DIFF WBC: CPT

## 2025-02-18 PROCEDURE — 84100 ASSAY OF PHOSPHORUS: CPT

## 2025-02-18 PROCEDURE — 36415 COLL VENOUS BLD VENIPUNCTURE: CPT

## 2025-02-18 PROCEDURE — C1751 CATH, INF, PER/CENT/MIDLINE: HCPCS

## 2025-02-18 PROCEDURE — 2060000000 HC ICU INTERMEDIATE R&B

## 2025-02-18 PROCEDURE — 2500000003 HC RX 250 WO HCPCS: Performed by: SPECIALIST

## 2025-02-18 PROCEDURE — 36573 INSJ PICC RS&I 5 YR+: CPT

## 2025-02-18 PROCEDURE — 6370000000 HC RX 637 (ALT 250 FOR IP): Performed by: FAMILY MEDICINE

## 2025-02-18 PROCEDURE — 80053 COMPREHEN METABOLIC PANEL: CPT

## 2025-02-18 PROCEDURE — 99239 HOSP IP/OBS DSCHRG MGMT >30: CPT | Performed by: FAMILY MEDICINE

## 2025-02-18 PROCEDURE — 83735 ASSAY OF MAGNESIUM: CPT

## 2025-02-18 RX ADMIN — Medication 5 MG: at 20:47

## 2025-02-18 RX ADMIN — SODIUM CHLORIDE, PRESERVATIVE FREE 10 ML: 5 INJECTION INTRAVENOUS at 20:47

## 2025-02-18 RX ADMIN — Medication 1 TABLET: at 07:39

## 2025-02-18 RX ADMIN — MIRTAZAPINE 15 MG: 15 TABLET, FILM COATED ORAL at 20:47

## 2025-02-18 RX ADMIN — MEROPENEM 1000 MG: 1 INJECTION INTRAVENOUS at 05:20

## 2025-02-18 RX ADMIN — POLYETHYLENE GLYCOL 3350 17 G: 17 POWDER, FOR SOLUTION ORAL at 07:40

## 2025-02-18 RX ADMIN — ENOXAPARIN SODIUM 30 MG: 100 INJECTION SUBCUTANEOUS at 08:12

## 2025-02-18 RX ADMIN — SODIUM CHLORIDE, PRESERVATIVE FREE 10 ML: 5 INJECTION INTRAVENOUS at 07:40

## 2025-02-18 RX ADMIN — ZINC SULFATE 220 MG (50 MG) CAPSULE 50 MG: CAPSULE at 07:40

## 2025-02-18 RX ADMIN — FERROUS SULFATE TAB 325 MG (65 MG ELEMENTAL FE) 325 MG: 325 (65 FE) TAB at 07:39

## 2025-02-18 RX ADMIN — DOCUSATE SODIUM 100 MG: 100 CAPSULE, LIQUID FILLED ORAL at 20:47

## 2025-02-18 RX ADMIN — OXYCODONE HYDROCHLORIDE AND ACETAMINOPHEN 500 MG: 500 TABLET ORAL at 07:40

## 2025-02-18 RX ADMIN — Medication 2000 UNITS: at 07:39

## 2025-02-18 RX ADMIN — OXYCODONE HYDROCHLORIDE AND ACETAMINOPHEN 500 MG: 500 TABLET ORAL at 20:47

## 2025-02-18 RX ADMIN — DOCUSATE SODIUM 100 MG: 100 CAPSULE, LIQUID FILLED ORAL at 07:39

## 2025-02-18 RX ADMIN — CALCIUM CARBONATE 1000 MG: 500 TABLET, CHEWABLE ORAL at 07:40

## 2025-02-18 RX ADMIN — SERTRALINE HYDROCHLORIDE 25 MG: 50 TABLET ORAL at 07:39

## 2025-02-18 RX ADMIN — MEROPENEM 1000 MG: 1 INJECTION INTRAVENOUS at 13:58

## 2025-02-18 RX ADMIN — MEROPENEM 1000 MG: 1 INJECTION INTRAVENOUS at 21:00

## 2025-02-18 NOTE — CARE COORDINATION
2/18/25 1012 CM note: covid/flu (-), bl cx 2/13/25 ngtd, urine cx (+) 2/13/25. IV merrem. WILL NEED CLARIFICATION OF ATB AT [place]. PICC line ordered. Room air. S/p CYSTOSCOPY left retrograde pyelogram, attempted stent placement and pollack catheter placement 2/14/25. Per urology pt will need f/u w/ Dr Sierra at [facility] for consideration of amauri PCNL's for stone clearance. Discharge order noted. Hx CVA w/ hemiplegia & hemiparesis. Pt came to us from [facility] and would like to return to this facility at discharge. Per OZ Carpio, pt is LTC at their facility, if she has a therapy/skilled need can bring back precert pending. ENRIQUE is signed. Placed patient on WILL CALL with PAS. Ambulance form on soft chart. Updated pt and CCNF symone Carpio on above. Electronically signed by Kasey Rocha RN on 2/18/2025 at 10:29 AM    Update: 2/18/25 Per ID IV merrem Q 8 hrs through 2/28/25. Midline placed. Arranged PAS  for 9:00pm.  Requested PAS try to outsource this trip. Ambulance form on soft chart. Notified pts MAIRA Flynn, charge nurse Rachana, and CCNF symone Carpio of  time. Electronically signed by Kasey Rocha RN on 2/18/2025 at 4:36 PM

## 2025-02-18 NOTE — PROGRESS NOTES
[facility] Hospitalist Progress Note    Admitting Date and Time: 2/13/2025  1:49 PM  Admit Dx: Kidney stone [N20.0]  Bladder stone [N21.0]  Septicemia (HCC) [A41.9]  AMBROCIO (acute kidney injury) (HCC) [N17.9]  Sepsis due to urinary tract infection (HCC) [A41.9, N39.0]  Urinary tract infection with hematuria, site unspecified [N39.0, R31.9]  Constipation, unspecified constipation type [K59.00]      Assessment:    Principal Problem:    Sepsis due to urinary tract infection (HCC)  Active Problems:    Hemiplegia and hemiparesis following cerebral infarction affecting left non-dominant side (HCC)    Major depressive disorder, recurrent (HCC)    Constipation    Metabolic acidosis  Resolved Problems:    * No resolved hospital problems. *      Plan:  2/13/2025  Sepsis  UTI  Staghorn calculus  Lactic acidosis  Hypokalemia  - sepsis bolus given in ED  - Rocephin  - f/u culture results  - urology c/s  - potassium replaced in ED  - repeat lactic acid     Hx CVA  Left hemiplegia  - continue home statin     Constipation  - bowel regimen     Depression  - continue home psych med     2/14/2025  Sepsis with septic encephalopathy present on admission due to urinary tract infection  -As evidenced by tachycardia, lactic acidosis, UA strongly suggestive of UTI  -Continue IV fluids, see below  -Blood cultures and urine cultures in process  -Increase ceftriaxone to 2 g IV daily.  Adjust/de-escalate based on culture results     Lactic acidosis/high anion gap metabolic acidosis and normal anion gap metabolic acidosis  -Lactic acidosis has resolved and she still has significant metabolic acidosis on BMP with bicarbonate of 15 this morning.  Consistent with mixed process  -Change IV fluids to D5-0.45% NaCl with 75 mEq NaHCO3 at 100 mL/h for 2 L     Staghorn calculi  -Urology consult     Transaminitis  -Most likely due to sepsis  -Trend LFTs daily for now     Major depressive disorder  -Continue sertraline     Dyslipidemia  -Continue  urology  --Plan for discharge after PICC placement    Code Status: Full  DVT prophylaxis: Lovenox    Continue at home medications as previously prescribed for the management of chronic conditions during this admission if and where appropriate.    Subjective:  Patient is being followed for Kidney stone [N20.0]  Bladder stone [N21.0]  Septicemia (HCC) [A41.9]  AMBROCIO (acute kidney injury) (HCC) [N17.9]  Sepsis due to urinary tract infection (HCC) [A41.9, N39.0]  Urinary tract infection with hematuria, site unspecified [N39.0, R31.9]  Constipation, unspecified constipation type [K59.00]   The patient is seen at bedside during rounds.  She has no acute complaints at this time.  She is awaiting PICC line placement.  There is a TeleSitter at bedside.  Case discussed with nursing and case management.  No family present.  No acute events overnight.  All questions and concerns were addressed during rounds.    ROS: denies fever, chills, cp, sob, n/v, HA unless stated above.      sodium chloride flush  5-40 mL IntraVENous 2 times per day    lidocaine 1 % injection  50 mg IntraDERmal Once    meropenem  1,000 mg IntraVENous Q8H    [Held by provider] atorvastatin  40 mg Oral Nightly    calcium carbonate  2 tablet Oral Daily    Vitamin D  2,000 Units Oral Daily    docusate sodium  100 mg Oral BID    ferrous sulfate  325 mg Oral Daily with breakfast    melatonin  5 mg Oral Nightly    mirtazapine  15 mg Oral Nightly    therapeutic multivitamin-minerals  1 tablet Oral Daily    polyethylene glycol  17 g Oral Daily    sertraline  25 mg Oral Daily    vitamin C  500 mg Oral BID    zinc sulfate  50 mg Oral Daily    sodium chloride flush  5-40 mL IntraVENous 2 times per day    enoxaparin  30 mg SubCUTAneous Daily     sodium chloride flush, 5-40 mL, PRN  sodium chloride, , PRN  bisacodyl, 10 mg, Daily PRN  sodium chloride flush, 5-40 mL, PRN  acetaminophen, 650 mg, Q6H PRN   Or  acetaminophen, 650 mg, Q6H PRN         Objective:    /79

## 2025-02-18 NOTE — FLOWSHEET NOTE
Inpatient Wound Care    Admit Date: 2/13/2025  1:49 PM    Reason for consult:  skin care    Significant history:        Findings:     02/18/25 1502   Skin Integumentary    Skin Integumentary (WDL) X   Skin Integrity Redness   Location coccyx buttocks         Impression:  redness over coccyx blanchable  Pt is very bony difficult to turn in bed      Interventions in place:  mepilex    Plan:  Cont mepilex  Encouraged pt to turn in bed      Rita Lindsay RN 2/18/2025 3:03 PM

## 2025-02-18 NOTE — PLAN OF CARE
Problem: Chronic Conditions and Co-morbidities  Goal: Patient's chronic conditions and co-morbidity symptoms are monitored and maintained or improved  2/18/2025 1558 by Rina Bradley RN  Outcome: Progressing  2/18/2025 0303 by Jignesh Rose RN  Outcome: Progressing     Problem: Discharge Planning  Goal: Discharge to home or other facility with appropriate resources  2/18/2025 1558 by Rina Bradley RN  Outcome: Progressing  2/18/2025 0303 by Jignesh Rose RN  Outcome: Progressing     Problem: Pain  Goal: Verbalizes/displays adequate comfort level or baseline comfort level  2/18/2025 1558 by Rina Bradley RN  Outcome: Progressing  2/18/2025 0303 by Jignesh Rose RN  Outcome: Progressing     Problem: Skin/Tissue Integrity  Goal: Skin integrity remains intact  Description: 1.  Monitor for areas of redness and/or skin breakdown  2.  Assess vascular access sites hourly  3.  Every 4-6 hours minimum:  Change oxygen saturation probe site  4.  Every 4-6 hours:  If on nasal continuous positive airway pressure, respiratory therapy assess nares and determine need for appliance change or resting period  2/18/2025 1558 by Rina Bradley RN  Outcome: Progressing  2/18/2025 0303 by Jignesh Rose RN  Outcome: Progressing     Problem: ABCDS Injury Assessment  Goal: Absence of physical injury  2/18/2025 1558 by Rina Bradley RN  Outcome: Progressing  2/18/2025 0303 by Jignesh Rose RN  Outcome: Progressing     Problem: Safety - Adult  Goal: Free from fall injury  2/18/2025 1558 by Rina Bradley RN  Outcome: Progressing  2/18/2025 0303 by Jignesh Rose RN  Outcome: Progressing     Problem: Nutrition Deficit:  Goal: Optimize nutritional status  2/18/2025 1558 by Rina Bradley RN  Outcome: Progressing  2/18/2025 0303 by Jignesh Rose RN  Outcome: Progressing

## 2025-02-18 NOTE — PROGRESS NOTES
Physician Progress Note      PATIENT:               ZONIA ALLEN  CSN #:                  [id number]  :                       1970  ADMIT DATE:       2025 1:49 PM  DISCH DATE:  RESPONDING  PROVIDER #:        Wili Walker DO          QUERY TEXT:    Pt admitted with Sepsis. Pt noted to have Sepsis and acute organ dysfunction   with Encephalopathy.  If possible, please document in progress notes and   discharge summary the relationship, if any, between Sepsis and Encephalopathy.    The medical record reflects the following:  Risk Factors: Sepsis  Clinical Indicators: pt. is being treated for Sepsis. UTI, noted documentation   of \"pt. admitted from nursing facility yesterday evening with sepsis due to   UTI with Septic encephalopathy\". by Dr. Walker, starting on 25.  Treatment: Rocephin IV, 1.7 ML of NS on arrival, Cystoscopy.  Options provided:  -- Acute organ dysfunction of encephalopathy is associated with sepsis  -- Acute organ dysfunction of Encephalopathy is unrelated to sepsis  -- Other - I will add my own diagnosis  -- Disagree - Not applicable / Not valid  -- Disagree - Clinically unable to determine / Unknown  -- Refer to Clinical Documentation Reviewer    PROVIDER RESPONSE TEXT:    This patient has acute organ dysfunction of Encephalopathy associated with   sepsis.    Query created by: Devika Slaughter on 2025 11:52 AM      Electronically signed by:  Wili Walker DO 2025 8:48 AM

## 2025-02-18 NOTE — PROGRESS NOTES
Patient came in to special procedures for PICC line placement, ordered by Dr. Marcano     Procedure explained,  Questions answered consent signed.  Patient was educated about the amount of radiation used with today's procedure.     PICC line placed at 35 cm at the skin, in the left brachial.    Pink port does not flush.     Patient given papers/card for PICC line use.

## 2025-02-18 NOTE — DISCHARGE SUMMARY
Discharge Summary  Patient ID:  Brigid Moses  [id number]  55 y.o. 1970 female  Yessenia Perry MD        Admit date: 2/13/2025    Discharge date and time:  2/18/2025  11:31 AM      Activity level: Baseline to advance as able/tolerated  Diet: Regular diet as tolerated  Labs: None  Disposition: Skilled nursing facility  Condition on Discharge: Stable  DME: None    Admit Diagnoses:   Patient Active Problem List   Diagnosis    Hammer toe, acquired    Altered mental status    Anemia, unspecified    Dysphagia following cerebral infarction    Hemiplegia and hemiparesis following cerebral infarction affecting left non-dominant side (HCC)    Primary hypertension    Major depressive disorder, recurrent (HCC)    Cerebral artery occlusion with cerebral infarction (HCC)    Sepsis due to urinary tract infection (HCC)    Constipation    Metabolic acidosis       Discharge Diagnoses: Principal Problem:    Sepsis due to urinary tract infection (HCC)  Active Problems:    Hemiplegia and hemiparesis following cerebral infarction affecting left non-dominant side (HCC)    Major depressive disorder, recurrent (HCC)    Constipation    Metabolic acidosis  Resolved Problems:    * No resolved hospital problems. *      Consults:  IP CONSULT TO UROLOGY  IP CONSULT TO DIETITIAN  IP CONSULT TO INFECTIOUS DISEASES  IP CONSULT TO VASCULAR ACCESS TEAM    Procedures: PICC line placement right arm 2/18/2025; 2/14/2025 CYSTOSCOPY left retrograde pyelogram, attempted stent placement     Hospital Course: 55 y.o. female with a history of recurrent nephrolithiasis with prior lithotripsy, CVA with left hemiplegia, HTN, GERD presents from nursing facility for concern of low BP.  Patient reports she has had discolored foul smelling urine recently, denies fever or chills.  Patient also complains of poor dentition and difficulty eating.  Workup in ED significant for WBC 12.5, CO2 15, BUN 43, creatinine 1.1, lactic 3.6, troponin 79 and 70, ALT 40, AST 72.   UA nitrite and leukocyte esterase positive.  CT abd/pelvis bilateral staghorn calculi, multiple left ureteral calculi, constipation.  Given sepsis bolus, Rocephin, magnesium, potassium in ED. the patient was admitted for further workup, evaluation, and management.  Her hospital course and problems progressed as follows:    2/13/2025  Sepsis  UTI  Staghorn calculus  Lactic acidosis  Hypokalemia  - sepsis bolus given in ED  - Rocephin  - f/u culture results  - urology c/s  - potassium replaced in ED  - repeat lactic acid     Hx CVA  Left hemiplegia  - continue home statin     Constipation  - bowel regimen     Depression  - continue home psych med     2/14/2025  Sepsis with septic encephalopathy present on admission due to urinary tract infection  -As evidenced by tachycardia, lactic acidosis, UA strongly suggestive of UTI  -Continue IV fluids, see below  -Blood cultures and urine cultures in process  -Increase ceftriaxone to 2 g IV daily.  Adjust/de-escalate based on culture results     Lactic acidosis/high anion gap metabolic acidosis and normal anion gap metabolic acidosis  -Lactic acidosis has resolved and she still has significant metabolic acidosis on BMP with bicarbonate of 15 this morning.  Consistent with mixed process  -Change IV fluids to D5-0.45% NaCl with 75 mEq NaHCO3 at 100 mL/h for 2 L     Staghorn calculi  -Urology consult     Transaminitis  -Most likely due to sepsis  -Trend LFTs daily for now     Major depressive disorder  -Continue sertraline     Dyslipidemia  -Continue atorvastatin     Disposition: Anticipate at least 48 more hours inpatient treatment and monitoring until she is medically ready for discharge     2/15/2025  Sepsis with septic encephalopathy present on admission due to urinary tract infection  -As evidenced by tachycardia, lactic acidosis, UA strongly suggestive of UTI on presentation  -Continue IV fluids, see below  -Blood cultures no growth to date however urine cultures are

## 2025-02-18 NOTE — PROCEDURES
PROCEDURE NOTE  Date: 2/18/2025   Name: Brigid Moses  YOB: 1970    Procedures attempted right arm for Midline, noted clot in brachial on right, left brachial attempted able to place needle, but unable to advance guidewire to desired length, Other PICC nurse set up left side, pt unable to hold arm in position to place line, procedure aborted for pt comfort, pt needs to go to IR for placement at this time

## 2025-02-18 NOTE — PROGRESS NOTES
Patient - Brigid Moses,  Age - 55 y.o.    - 1970      Room Number - 0518/0518-01   MRN -  [id number]   [facility] # - [id number]  Date of Admission -  2025  1:49 PM    Problem list of patient:     Hospital Problems             Last Modified POA    * (Principal) Sepsis due to urinary tract infection (HCC) 2025 Yes    Hemiplegia and hemiparesis following cerebral infarction affecting left non-dominant side (HCC) 2025 Yes    Major depressive disorder, recurrent (HCC) 2025 Yes    Constipation 2025 Yes    Metabolic acidosis 2025 Yes     ASSESSMENT/PLAN   Leukocytosis better   Complicated uti Psedomonas/ESBL E coli  Extensive bilateral staghorn like calculi. With staghorn pt should be on suppressive atb unfortunately with current cx no oral  suppressive available   CYSTOSCOPY left retrograde pyelogram, attempted stent placement   Urology recommendation for bilateral nephrostomy tube placement for eventual PCNL   Rue edema if cont suggest us rue   Has purwick   Cont meropenem   Med rec  Will need picc for  copat 14 days          Coordination of Outpatient Care:   Estimated Length of  antimicrobials: 2 weeks   Patient will need Midline Catheter Insertion          SUBJECTIVE:   DOS:  25  in bed has ue lindsay anad no c/o    afebrile tachy getting blood draw cr0.6    afebrile tachy bp stable RA in bed feels col no issues     OBJECTIVE   VITALS    height is 1.577 m (5' 2.09\") and weight is 45.8 kg (101 lb). Her axillary temperature is 98.2 °F (36.8 °C). Her blood pressure is 126/82 and her pulse is 118 (abnormal). Her respiration is 22 and oxygen saturation is 97%.       General Appearance:    in bed  comfortable   HEENT:    at/nc  Lungs:    cta ant B   Cardiovascular:  s1/s2  Abdomen:   soft nt /nd  :   External pollack   Neurologic:   nad    Skin :    no rash  pales  Extremities:     ue edema  lue contracted

## 2025-02-18 NOTE — PLAN OF CARE
Problem: Chronic Conditions and Co-morbidities  Goal: Patient's chronic conditions and co-morbidity symptoms are monitored and maintained or improved  2/18/2025 0303 by Jignesh Rose RN  Outcome: Progressing  2/17/2025 1809 by Rina Bradley RN  Outcome: Progressing  2/17/2025 1641 by Rina Bradley RN  Outcome: Progressing  2/17/2025 1641 by Rina Bradley RN  Outcome: Progressing     Problem: Discharge Planning  Goal: Discharge to home or other facility with appropriate resources  2/18/2025 0303 by Jignesh Rose RN  Outcome: Progressing  2/17/2025 1809 by Rina Bradley RN  Outcome: Progressing  2/17/2025 1641 by Rina Bradley RN  Outcome: Progressing  2/17/2025 1641 by Rina Bradley RN  Outcome: Progressing     Problem: Pain  Goal: Verbalizes/displays adequate comfort level or baseline comfort level  2/18/2025 0303 by Jignesh Rose RN  Outcome: Progressing  2/17/2025 1809 by Rina Bradley RN  Outcome: Progressing  2/17/2025 1641 by Rina Bradley RN  Outcome: Progressing  2/17/2025 1641 by Rina Bradley RN  Outcome: Progressing     Problem: Skin/Tissue Integrity  Goal: Skin integrity remains intact  Description: 1.  Monitor for areas of redness and/or skin breakdown  2.  Assess vascular access sites hourly  3.  Every 4-6 hours minimum:  Change oxygen saturation probe site  4.  Every 4-6 hours:  If on nasal continuous positive airway pressure, respiratory therapy assess nares and determine need for appliance change or resting period  2/18/2025 0303 by Jignesh Rose RN  Outcome: Progressing  2/17/2025 1809 by Rina Bradley RN  Outcome: Progressing  2/17/2025 1641 by Rina Bradley RN  Outcome: Progressing     Problem: ABCDS Injury Assessment  Goal: Absence of physical injury  2/18/2025 0303 by Jignesh Rose RN  Outcome: Progressing  2/17/2025 1809 by Rina Bradley RN  Outcome: Progressing  2/17/2025 1641 by Rina Bradley, RN  Outcome:

## 2025-02-19 NOTE — PLAN OF CARE
Problem: Discharge Planning  Goal: Discharge to home or other facility with appropriate resources  Outcome: Progressing     Problem: Chronic Conditions and Co-morbidities  Goal: Patient's chronic conditions and co-morbidity symptoms are monitored and maintained or improved  2/18/2025 1657 by Rachana Huff RN  Outcome: Adequate for Discharge  2/18/2025 1558 by Rina Bradley RN  Outcome: Progressing     Problem: Discharge Planning  Goal: Discharge to home or other facility with appropriate resources  2/18/2025 1657 by Rachana Huff RN  Outcome: Adequate for Discharge  2/18/2025 1558 by Rina Bradley RN  Outcome: Progressing     Problem: Pain  Goal: Verbalizes/displays adequate comfort level or baseline comfort level  2/18/2025 1657 by Rachana Huff RN  Outcome: Adequate for Discharge  2/18/2025 1558 by Rina Bradley RN  Outcome: Progressing     Problem: Skin/Tissue Integrity  Goal: Skin integrity remains intact  Description: 1.  Monitor for areas of redness and/or skin breakdown  2.  Assess vascular access sites hourly  3.  Every 4-6 hours minimum:  Change oxygen saturation probe site  4.  Every 4-6 hours:  If on nasal continuous positive airway pressure, respiratory therapy assess nares and determine need for appliance change or resting period  2/18/2025 1657 by Rachana Huff RN  Outcome: Adequate for Discharge  2/18/2025 1558 by Rina Bradley RN  Outcome: Progressing     Problem: ABCDS Injury Assessment  Goal: Absence of physical injury  2/18/2025 1657 by Rachana Huff, RN  Outcome: Adequate for Discharge  2/18/2025 1558 by Rina Bradley RN  Outcome: Progressing     Problem: Safety - Adult  Goal: Free from fall injury  2/18/2025 1657 by Rachana Huff, RN  Outcome: Adequate for Discharge  2/18/2025 1558 by Rina Bradley RN  Outcome: Progressing     Problem: Nutrition Deficit:  Goal: Optimize nutritional status  2/18/2025 1657 by Rachana Huff, RN  Outcome: Adequate for

## 2025-02-20 LAB
STONE COMPOSITION: NORMAL
STONE DESCRIPTION: NORMAL
STONE MASS: 250 MG
SURGICAL PATHOLOGY REPORT: NORMAL

## 2025-04-09 NOTE — PROGRESS NOTES
Physician Progress Note      PATIENT:               ZONIA ALLEN  CSN #:                  [id number]  :                       1970  ADMIT DATE:       2025 1:49 PM  DISCH DATE:        2025 1:36 AM  RESPONDING  PROVIDER #:        Yanet Jj DO          QUERY TEXT:    Please clarify the patient?s nutritional status:    The clinical indicators include:  - Dietary consult on 25 states \"moderate malnutrition\" with ASPEN Scale   documented.  -ASPEN scale by dietary on consult note dated 25. \"75% or less estimated   energy, moderate body fat loss, orbital, triceps and buccal, moderate muscle   mass loss temples, clavicles.\", \"Current BMI: 18.4\".  Options provided:  -- Protein calorie malnutrition mild  -- Protein calorie malnutrition moderate  -- Protein calorie malnutrition severe  -- Underweight with BMI ***  -- Cachexia  -- Abnormal weight loss  -- Other - I will add my own diagnosis  -- Disagree - Not applicable / Not valid  -- Disagree - Clinically unable to determine / Unknown  -- Refer to Clinical Documentation Reviewer    PROVIDER RESPONSE TEXT:    This patient has moderate protein calorie malnutrition.    Query created by: Devika Sluaghter on 4/3/2025 11:32 AM      Electronically signed by:  Yanet Jj DO 2025 6:38 PM

## 2025-04-21 ENCOUNTER — APPOINTMENT (OUTPATIENT)
Dept: CT IMAGING | Age: 55
DRG: 659 | End: 2025-04-21
Payer: COMMERCIAL

## 2025-04-21 ENCOUNTER — HOSPITAL ENCOUNTER (INPATIENT)
Age: 55
LOS: 4 days | Discharge: NURSING, REHAB OR LONG-TERM CARE | DRG: 659 | End: 2025-04-25
Attending: STUDENT IN AN ORGANIZED HEALTH CARE EDUCATION/TRAINING PROGRAM | Admitting: INTERNAL MEDICINE
Payer: COMMERCIAL

## 2025-04-21 DIAGNOSIS — E86.0 DEHYDRATION: ICD-10-CM

## 2025-04-21 DIAGNOSIS — D50.0 BLOOD LOSS ANEMIA: ICD-10-CM

## 2025-04-21 DIAGNOSIS — N39.0 COMPLICATED UTI (URINARY TRACT INFECTION): ICD-10-CM

## 2025-04-21 DIAGNOSIS — K92.1 GASTROINTESTINAL HEMORRHAGE WITH MELENA: Primary | ICD-10-CM

## 2025-04-21 DIAGNOSIS — N12 PYELITIS: ICD-10-CM

## 2025-04-21 DIAGNOSIS — A49.8 INFECTION DUE TO ESBL-PRODUCING ESCHERICHIA COLI: ICD-10-CM

## 2025-04-21 DIAGNOSIS — N17.9 AKI (ACUTE KIDNEY INJURY): ICD-10-CM

## 2025-04-21 DIAGNOSIS — Z16.12 INFECTION DUE TO ESBL-PRODUCING ESCHERICHIA COLI: ICD-10-CM

## 2025-04-21 DIAGNOSIS — D64.9 ANEMIA REQUIRING TRANSFUSIONS: ICD-10-CM

## 2025-04-21 DIAGNOSIS — I21.4 NSTEMI (NON-ST ELEVATED MYOCARDIAL INFARCTION) (HCC): ICD-10-CM

## 2025-04-21 DIAGNOSIS — N20.0 BILATERAL KIDNEY STONES: ICD-10-CM

## 2025-04-21 DIAGNOSIS — N13.9 OBSTRUCTIVE UROPATHY: ICD-10-CM

## 2025-04-21 DIAGNOSIS — N13.30 BILATERAL HYDRONEPHROSIS: ICD-10-CM

## 2025-04-21 DIAGNOSIS — I24.89 DEMAND ISCHEMIA (HCC): ICD-10-CM

## 2025-04-21 PROBLEM — K92.2 ACUTE UPPER GI HEMORRHAGE: Status: ACTIVE | Noted: 2025-04-21

## 2025-04-21 LAB
ALBUMIN SERPL-MCNC: 2.4 G/DL (ref 3.5–5.2)
ALP SERPL-CCNC: 144 U/L (ref 35–104)
ALT SERPL-CCNC: 78 U/L (ref 0–32)
ANION GAP SERPL CALCULATED.3IONS-SCNC: 12 MMOL/L (ref 7–16)
AST SERPL-CCNC: 94 U/L (ref 0–31)
BACTERIA URNS QL MICRO: ABNORMAL
BASOPHILS # BLD: 0.04 K/UL (ref 0–0.2)
BASOPHILS NFR BLD: 0 % (ref 0–2)
BILIRUB SERPL-MCNC: 0.2 MG/DL (ref 0–1.2)
BILIRUB UR QL STRIP: NEGATIVE
BUN SERPL-MCNC: 24 MG/DL (ref 6–20)
CALCIUM SERPL-MCNC: 8.6 MG/DL (ref 8.6–10.2)
CHLORIDE SERPL-SCNC: 107 MMOL/L (ref 98–107)
CLARITY UR: ABNORMAL
CO2 SERPL-SCNC: 16 MMOL/L (ref 22–29)
COLOR UR: YELLOW
CREAT SERPL-MCNC: 1.2 MG/DL (ref 0.5–1)
EKG ATRIAL RATE: 101 BPM
EKG P AXIS: 5 DEGREES
EKG P-R INTERVAL: 150 MS
EKG Q-T INTERVAL: 356 MS
EKG QRS DURATION: 74 MS
EKG QTC CALCULATION (BAZETT): 461 MS
EKG R AXIS: -13 DEGREES
EKG T AXIS: 13 DEGREES
EKG VENTRICULAR RATE: 101 BPM
EOSINOPHIL # BLD: 0.15 K/UL (ref 0.05–0.5)
EOSINOPHILS RELATIVE PERCENT: 1 % (ref 0–6)
ERYTHROCYTE [DISTWIDTH] IN BLOOD BY AUTOMATED COUNT: 16.8 % (ref 11.5–15)
FERRITIN SERPL-MCNC: 1051 NG/ML
GFR, ESTIMATED: 52 ML/MIN/1.73M2
GLUCOSE SERPL-MCNC: 91 MG/DL (ref 74–99)
GLUCOSE UR STRIP-MCNC: NEGATIVE MG/DL
HCT VFR BLD AUTO: 24.3 % (ref 34–48)
HCT VFR BLD AUTO: 29.3 % (ref 34–48)
HGB BLD-MCNC: 7.9 G/DL (ref 11.5–15.5)
HGB BLD-MCNC: 9.3 G/DL (ref 11.5–15.5)
HGB UR QL STRIP.AUTO: ABNORMAL
IMM GRANULOCYTES # BLD AUTO: 0.26 K/UL (ref 0–0.58)
IMM GRANULOCYTES NFR BLD: 2 % (ref 0–5)
INR PPP: 1.3
IRON SATN MFR SERPL: 9 % (ref 15–50)
IRON SERPL-MCNC: 11 UG/DL (ref 37–145)
KETONES UR STRIP-MCNC: NEGATIVE MG/DL
LACTATE BLDV-SCNC: 0.6 MMOL/L (ref 0.5–1.9)
LACTATE BLDV-SCNC: 0.8 MMOL/L (ref 0.5–1.9)
LEUKOCYTE ESTERASE UR QL STRIP: ABNORMAL
LIPASE SERPL-CCNC: 17 U/L (ref 13–60)
LYMPHOCYTES NFR BLD: 1.22 K/UL (ref 1.5–4)
LYMPHOCYTES RELATIVE PERCENT: 11 % (ref 20–42)
MAGNESIUM SERPL-MCNC: 1.6 MG/DL (ref 1.6–2.6)
MCH RBC QN AUTO: 28.4 PG (ref 26–35)
MCHC RBC AUTO-ENTMCNC: 32.5 G/DL (ref 32–34.5)
MCV RBC AUTO: 87.4 FL (ref 80–99.9)
MONOCYTES NFR BLD: 1.07 K/UL (ref 0.1–0.95)
MONOCYTES NFR BLD: 10 % (ref 2–12)
NEUTROPHILS NFR BLD: 75 % (ref 43–80)
NEUTS SEG NFR BLD: 8.28 K/UL (ref 1.8–7.3)
NITRITE UR QL STRIP: POSITIVE
PARTIAL THROMBOPLASTIN TIME: 33.5 SEC (ref 24.5–35.1)
PH UR STRIP: 6.5 [PH] (ref 5–8)
PLATELET # BLD AUTO: 448 K/UL (ref 130–450)
PMV BLD AUTO: 9.5 FL (ref 7–12)
POTASSIUM SERPL-SCNC: 3.2 MMOL/L (ref 3.5–5)
POTASSIUM SERPL-SCNC: 3.3 MMOL/L (ref 3.5–5)
PROT SERPL-MCNC: 6.1 G/DL (ref 6.4–8.3)
PROT UR STRIP-MCNC: 30 MG/DL
PROTHROMBIN TIME: 14 SEC (ref 9.3–12.4)
RBC # BLD AUTO: 2.78 M/UL (ref 3.5–5.5)
RBC #/AREA URNS HPF: ABNORMAL /HPF
SODIUM SERPL-SCNC: 135 MMOL/L (ref 132–146)
SP GR UR STRIP: <1.005 (ref 1–1.03)
T4 FREE SERPL-MCNC: 1.3 NG/DL (ref 0.9–1.7)
TIBC SERPL-MCNC: 128 UG/DL (ref 250–450)
TROPONIN I SERPL HS-MCNC: 284 NG/L (ref 0–14)
TROPONIN I SERPL HS-MCNC: 288 NG/L (ref 0–14)
TSH SERPL DL<=0.05 MIU/L-ACNC: 1.88 UIU/ML (ref 0.27–4.2)
UROBILINOGEN UR STRIP-ACNC: 0.2 EU/DL (ref 0–1)
WBC #/AREA URNS HPF: ABNORMAL /HPF
WBC OTHER # BLD: 11 K/UL (ref 4.5–11.5)

## 2025-04-21 PROCEDURE — 36430 TRANSFUSION BLD/BLD COMPNT: CPT

## 2025-04-21 PROCEDURE — 85025 COMPLETE CBC W/AUTO DIFF WBC: CPT

## 2025-04-21 PROCEDURE — 84439 ASSAY OF FREE THYROXINE: CPT

## 2025-04-21 PROCEDURE — 85730 THROMBOPLASTIN TIME PARTIAL: CPT

## 2025-04-21 PROCEDURE — 86923 COMPATIBILITY TEST ELECTRIC: CPT

## 2025-04-21 PROCEDURE — 6370000000 HC RX 637 (ALT 250 FOR IP): Performed by: NURSE PRACTITIONER

## 2025-04-21 PROCEDURE — 83605 ASSAY OF LACTIC ACID: CPT

## 2025-04-21 PROCEDURE — 2500000003 HC RX 250 WO HCPCS: Performed by: NURSE PRACTITIONER

## 2025-04-21 PROCEDURE — 83550 IRON BINDING TEST: CPT

## 2025-04-21 PROCEDURE — 6360000004 HC RX CONTRAST MEDICATION: Performed by: RADIOLOGY

## 2025-04-21 PROCEDURE — 84443 ASSAY THYROID STIM HORMONE: CPT

## 2025-04-21 PROCEDURE — 87150 DNA/RNA AMPLIFIED PROBE: CPT

## 2025-04-21 PROCEDURE — 74177 CT ABD & PELVIS W/CONTRAST: CPT

## 2025-04-21 PROCEDURE — APPSS45 APP SPLIT SHARED TIME 31-45 MINUTES: Performed by: NURSE PRACTITIONER

## 2025-04-21 PROCEDURE — 80053 COMPREHEN METABOLIC PANEL: CPT

## 2025-04-21 PROCEDURE — 96361 HYDRATE IV INFUSION ADD-ON: CPT

## 2025-04-21 PROCEDURE — 51702 INSERT TEMP BLADDER CATH: CPT

## 2025-04-21 PROCEDURE — 85014 HEMATOCRIT: CPT

## 2025-04-21 PROCEDURE — 86901 BLOOD TYPING SEROLOGIC RH(D): CPT

## 2025-04-21 PROCEDURE — 84132 ASSAY OF SERUM POTASSIUM: CPT

## 2025-04-21 PROCEDURE — 85018 HEMOGLOBIN: CPT

## 2025-04-21 PROCEDURE — 84484 ASSAY OF TROPONIN QUANT: CPT

## 2025-04-21 PROCEDURE — 87040 BLOOD CULTURE FOR BACTERIA: CPT

## 2025-04-21 PROCEDURE — 2500000003 HC RX 250 WO HCPCS: Performed by: INTERNAL MEDICINE

## 2025-04-21 PROCEDURE — 36415 COLL VENOUS BLD VENIPUNCTURE: CPT

## 2025-04-21 PROCEDURE — 85610 PROTHROMBIN TIME: CPT

## 2025-04-21 PROCEDURE — 6360000002 HC RX W HCPCS: Performed by: STUDENT IN AN ORGANIZED HEALTH CARE EDUCATION/TRAINING PROGRAM

## 2025-04-21 PROCEDURE — 87086 URINE CULTURE/COLONY COUNT: CPT

## 2025-04-21 PROCEDURE — 82746 ASSAY OF FOLIC ACID SERUM: CPT

## 2025-04-21 PROCEDURE — 99285 EMERGENCY DEPT VISIT HI MDM: CPT

## 2025-04-21 PROCEDURE — 83540 ASSAY OF IRON: CPT

## 2025-04-21 PROCEDURE — 86850 RBC ANTIBODY SCREEN: CPT

## 2025-04-21 PROCEDURE — 30233N1 TRANSFUSION OF NONAUTOLOGOUS RED BLOOD CELLS INTO PERIPHERAL VEIN, PERCUTANEOUS APPROACH: ICD-10-PCS | Performed by: INTERNAL MEDICINE

## 2025-04-21 PROCEDURE — 83735 ASSAY OF MAGNESIUM: CPT

## 2025-04-21 PROCEDURE — 86900 BLOOD TYPING SEROLOGIC ABO: CPT

## 2025-04-21 PROCEDURE — P9016 RBC LEUKOCYTES REDUCED: HCPCS

## 2025-04-21 PROCEDURE — 96365 THER/PROPH/DIAG IV INF INIT: CPT

## 2025-04-21 PROCEDURE — 82607 VITAMIN B-12: CPT

## 2025-04-21 PROCEDURE — 99223 1ST HOSP IP/OBS HIGH 75: CPT | Performed by: INTERNAL MEDICINE

## 2025-04-21 PROCEDURE — 93010 ELECTROCARDIOGRAM REPORT: CPT | Performed by: INTERNAL MEDICINE

## 2025-04-21 PROCEDURE — 83690 ASSAY OF LIPASE: CPT

## 2025-04-21 PROCEDURE — 2060000000 HC ICU INTERMEDIATE R&B

## 2025-04-21 PROCEDURE — 81001 URINALYSIS AUTO W/SCOPE: CPT

## 2025-04-21 PROCEDURE — 82728 ASSAY OF FERRITIN: CPT

## 2025-04-21 PROCEDURE — 2580000003 HC RX 258: Performed by: STUDENT IN AN ORGANIZED HEALTH CARE EDUCATION/TRAINING PROGRAM

## 2025-04-21 PROCEDURE — 2580000003 HC RX 258: Performed by: INTERNAL MEDICINE

## 2025-04-21 PROCEDURE — 93005 ELECTROCARDIOGRAM TRACING: CPT | Performed by: STUDENT IN AN ORGANIZED HEALTH CARE EDUCATION/TRAINING PROGRAM

## 2025-04-21 RX ORDER — POTASSIUM CHLORIDE 7.45 MG/ML
10 INJECTION INTRAVENOUS
Status: DISPENSED | OUTPATIENT
Start: 2025-04-21 | End: 2025-04-21

## 2025-04-21 RX ORDER — ONDANSETRON 2 MG/ML
4 INJECTION INTRAMUSCULAR; INTRAVENOUS EVERY 6 HOURS PRN
Status: DISCONTINUED | OUTPATIENT
Start: 2025-04-21 | End: 2025-04-25 | Stop reason: HOSPADM

## 2025-04-21 RX ORDER — 0.9 % SODIUM CHLORIDE 0.9 %
1000 INTRAVENOUS SOLUTION INTRAVENOUS ONCE
Status: COMPLETED | OUTPATIENT
Start: 2025-04-21 | End: 2025-04-21

## 2025-04-21 RX ORDER — VITAMIN B COMPLEX
2000 TABLET ORAL DAILY
Status: DISCONTINUED | OUTPATIENT
Start: 2025-04-22 | End: 2025-04-25 | Stop reason: HOSPADM

## 2025-04-21 RX ORDER — ATORVASTATIN CALCIUM 40 MG/1
40 TABLET, FILM COATED ORAL DAILY
Status: DISCONTINUED | OUTPATIENT
Start: 2025-04-22 | End: 2025-04-25 | Stop reason: HOSPADM

## 2025-04-21 RX ORDER — SUCRALFATE 1 G/1
1 TABLET ORAL 4 TIMES DAILY
COMMUNITY

## 2025-04-21 RX ORDER — PANTOPRAZOLE SODIUM 40 MG/1
40 TABLET, DELAYED RELEASE ORAL 2 TIMES DAILY
COMMUNITY

## 2025-04-21 RX ORDER — SODIUM CHLORIDE 9 MG/ML
INJECTION, SOLUTION INTRAVENOUS PRN
Status: DISCONTINUED | OUTPATIENT
Start: 2025-04-21 | End: 2025-04-25 | Stop reason: HOSPADM

## 2025-04-21 RX ORDER — MAGNESIUM OXIDE 400 MG/1
400 TABLET ORAL DAILY
Status: DISCONTINUED | OUTPATIENT
Start: 2025-04-22 | End: 2025-04-25 | Stop reason: HOSPADM

## 2025-04-21 RX ORDER — ASPIRIN 81 MG/1
81 TABLET, CHEWABLE ORAL DAILY
Status: DISCONTINUED | OUTPATIENT
Start: 2025-04-21 | End: 2025-04-25 | Stop reason: HOSPADM

## 2025-04-21 RX ORDER — ACETAMINOPHEN 650 MG/1
650 SUPPOSITORY RECTAL EVERY 6 HOURS PRN
Status: DISCONTINUED | OUTPATIENT
Start: 2025-04-21 | End: 2025-04-25 | Stop reason: HOSPADM

## 2025-04-21 RX ORDER — FERROUS SULFATE 325(65) MG
325 TABLET ORAL
Status: DISCONTINUED | OUTPATIENT
Start: 2025-04-22 | End: 2025-04-25 | Stop reason: HOSPADM

## 2025-04-21 RX ORDER — POLYETHYLENE GLYCOL 3350 17 G/17G
17 POWDER, FOR SOLUTION ORAL 2 TIMES DAILY
Status: DISCONTINUED | OUTPATIENT
Start: 2025-04-21 | End: 2025-04-25 | Stop reason: HOSPADM

## 2025-04-21 RX ORDER — ONDANSETRON 4 MG/1
4 TABLET, ORALLY DISINTEGRATING ORAL EVERY 8 HOURS PRN
Status: DISCONTINUED | OUTPATIENT
Start: 2025-04-21 | End: 2025-04-25 | Stop reason: HOSPADM

## 2025-04-21 RX ORDER — ZINC SULFATE 50(220)MG
50 CAPSULE ORAL DAILY
Status: DISCONTINUED | OUTPATIENT
Start: 2025-04-22 | End: 2025-04-25 | Stop reason: HOSPADM

## 2025-04-21 RX ORDER — SODIUM CHLORIDE 0.9 % (FLUSH) 0.9 %
5-40 SYRINGE (ML) INJECTION PRN
Status: DISCONTINUED | OUTPATIENT
Start: 2025-04-21 | End: 2025-04-25 | Stop reason: HOSPADM

## 2025-04-21 RX ORDER — MEGESTROL ACETATE 40 MG/1
20 TABLET ORAL DAILY
Status: DISCONTINUED | OUTPATIENT
Start: 2025-04-21 | End: 2025-04-25 | Stop reason: HOSPADM

## 2025-04-21 RX ORDER — POLYETHYLENE GLYCOL 3350 17 G/17G
17 POWDER, FOR SOLUTION ORAL 2 TIMES DAILY
Status: DISCONTINUED | OUTPATIENT
Start: 2025-04-21 | End: 2025-04-21 | Stop reason: SDUPTHER

## 2025-04-21 RX ORDER — SODIUM CHLORIDE 0.9 % (FLUSH) 0.9 %
5-40 SYRINGE (ML) INJECTION EVERY 12 HOURS SCHEDULED
Status: DISCONTINUED | OUTPATIENT
Start: 2025-04-21 | End: 2025-04-25 | Stop reason: HOSPADM

## 2025-04-21 RX ORDER — LISINOPRIL 5 MG/1
2.5 TABLET ORAL DAILY
Status: DISCONTINUED | OUTPATIENT
Start: 2025-04-21 | End: 2025-04-23

## 2025-04-21 RX ORDER — ACETAMINOPHEN 325 MG/1
650 TABLET ORAL EVERY 6 HOURS PRN
Status: DISCONTINUED | OUTPATIENT
Start: 2025-04-21 | End: 2025-04-25 | Stop reason: HOSPADM

## 2025-04-21 RX ORDER — MAGNESIUM SULFATE IN WATER 40 MG/ML
2000 INJECTION, SOLUTION INTRAVENOUS ONCE
Status: DISCONTINUED | OUTPATIENT
Start: 2025-04-21 | End: 2025-04-25 | Stop reason: HOSPADM

## 2025-04-21 RX ORDER — LANOLIN ALCOHOL/MO/W.PET/CERES
1000 CREAM (GRAM) TOPICAL DAILY
COMMUNITY

## 2025-04-21 RX ORDER — POLYETHYLENE GLYCOL 3350 17 G/17G
17 POWDER, FOR SOLUTION ORAL DAILY PRN
Status: DISCONTINUED | OUTPATIENT
Start: 2025-04-21 | End: 2025-04-25 | Stop reason: HOSPADM

## 2025-04-21 RX ORDER — MIRTAZAPINE 15 MG/1
15 TABLET, FILM COATED ORAL DAILY
Status: DISCONTINUED | OUTPATIENT
Start: 2025-04-21 | End: 2025-04-25 | Stop reason: HOSPADM

## 2025-04-21 RX ORDER — IOPAMIDOL 755 MG/ML
75 INJECTION, SOLUTION INTRAVASCULAR
Status: COMPLETED | OUTPATIENT
Start: 2025-04-21 | End: 2025-04-21

## 2025-04-21 RX ORDER — HYDROXYZINE HYDROCHLORIDE 10 MG/1
10 TABLET, FILM COATED ORAL 3 TIMES DAILY
Status: DISCONTINUED | OUTPATIENT
Start: 2025-04-21 | End: 2025-04-25 | Stop reason: HOSPADM

## 2025-04-21 RX ORDER — SODIUM CHLORIDE 9 MG/ML
INJECTION, SOLUTION INTRAVENOUS
Status: DISPENSED
Start: 2025-04-21 | End: 2025-04-22

## 2025-04-21 RX ADMIN — SODIUM BICARBONATE: 84 INJECTION INTRAVENOUS at 20:25

## 2025-04-21 RX ADMIN — SODIUM CHLORIDE 1000 ML: 9 INJECTION, SOLUTION INTRAVENOUS at 11:23

## 2025-04-21 RX ADMIN — IOPAMIDOL 75 ML: 755 INJECTION, SOLUTION INTRAVENOUS at 13:25

## 2025-04-21 RX ADMIN — SODIUM CHLORIDE, PRESERVATIVE FREE 10 ML: 5 INJECTION INTRAVENOUS at 22:20

## 2025-04-21 RX ADMIN — AMITRIPTYLINE HYDROCHLORIDE 25 MG: 25 TABLET, FILM COATED ORAL at 22:18

## 2025-04-21 RX ADMIN — HYDROXYZINE HYDROCHLORIDE 10 MG: 10 TABLET, FILM COATED ORAL at 22:18

## 2025-04-21 RX ADMIN — MIRTAZAPINE 15 MG: 15 TABLET, FILM COATED ORAL at 22:18

## 2025-04-21 RX ADMIN — PANTOPRAZOLE SODIUM 80 MG: 40 INJECTION, POWDER, FOR SOLUTION INTRAVENOUS at 14:08

## 2025-04-21 ASSESSMENT — ENCOUNTER SYMPTOMS
DIARRHEA: 0
BLOOD IN STOOL: 1
NAUSEA: 0
COUGH: 0
CONSTIPATION: 0
VOMITING: 0
SHORTNESS OF BREATH: 0
ABDOMINAL PAIN: 1

## 2025-04-21 ASSESSMENT — PAIN - FUNCTIONAL ASSESSMENT: PAIN_FUNCTIONAL_ASSESSMENT: NONE - DENIES PAIN

## 2025-04-21 NOTE — PROGRESS NOTES
Pharmacy Admission Medication Reconciliation      Allergies:  Bactrim [sulfamethoxazole-trimethoprim]    Source of history: [facility]ys[facility]     Preferred pharmacy:      Medication Changes Made to PTA List:  Medication Change/Update   Amitriptyline  Not Taking    Apixaban  Not Taking    Ibuprofen  Not Taking    Magnesium Oxide  Not Taking    Metoclopramide  Not Taking    Multivitamin  Not Taking    Phenazopyridine  Not Taking    Polycarbophil  Not Taking    Vitamin C Not Taking    Zinc  Not Taking    Ammonium 12% Changed to 5% QD PRN   Aspirin Chewable  Enteric Coated    Calcium Carbonate  QD to Q12 PRN   Famotidine  QD to BID    Hydroxyzine 25 mg  Changed to 10 mg TID   Magnesium Hydroxide  10 mL to 30 mL QD PRN    Sertraline 25 mg  Changed to 50 mg QD    Sucarafate  Added   B12  Added   Lactobacillus  Added   Linaclotide  Added   Protonix  Added   Venofer  Added     Current Prior to Admission (PTA) Medications  Prior to Visit Medications    Medication Sig Taking? Authorizing Provider   MELATONIN PO Take 8 mg by mouth nightly Yes ProviderRuchi MD   sucralfate (CARAFATE) 1 GM tablet Take 1 tablet by mouth 4 times daily Yes ProviderRuchi MD   vitamin B-12 (CYANOCOBALAMIN) 1000 MCG tablet Take 1 tablet by mouth daily Yes ProviderRuchi MD   LACTOBACILLUS PO Take 1 tablet by mouth daily Yes ProviderRuchi MD   linaCLOtide (LINZESS) 72 MCG CAPS capsule Take 1 capsule by mouth every morning (before breakfast) Yes ProviderRuchi MD   pantoprazole (PROTONIX) 40 MG tablet Take 1 tablet by mouth in the morning and at bedtime Yes ProviderRuchi MD   aspirin 81 MG EC tablet Take 1 tablet by mouth daily Yes ProviderRuchi MD   hydrOXYzine HCl (ATARAX) 10 MG tablet Take 1 tablet by mouth in the morning, at noon, and at bedtime Yes ProviderRuchi MD   lisinopril (PRINIVIL;ZESTRIL) 2.5 MG tablet Take 1 tablet by mouth daily Hold for SBP less than 100 Yes Provider,

## 2025-04-21 NOTE — H&P
[facility] Hospitalist Group   History and Physical    CHIEF COMPLAINT:  GI bleed    History of Present Illness:  Brigid Moses is a 55 y.o. female with a history of HTN and stroke with hemiplegia and neurogenic bladder with chronic pollack who presents with GI Bleeding (Sent from nursing home for possible GI bleed, patient states no complaints)  Pt reports that she was sent in by GI specialist for 'stroke watch' becaue she needs a scope and blood thinners must be stopped a few days prior to procedure. She had a stroke 'several' years ago and is on asa 81mg daily. She had been on apixaban for a DVT, but this was stopped ~1 week ago after US revealed resolution of DVT.   She has longstanding kidney stones, was supposed to see urologist tomorrow, but was told it has to be rescheduled because scope is more pressing at this time. She reports bilat flank pain-only when stones move; no ain currently but has had this pain in the last few days. She denies seeing any melena or hematochezia, but also notes that she wears a brief and is cleaned up by staff. She reports that they started checking her stool for blood a couple months ago and it was initially negative, but now is positive for blood. Denies CP, SOB, or any other symptoms or complaints.   Hypotensive in ER, remainder of VS stable. Hgb 7.9, K 3.2, sCr 1.2, CO2 16, liver enzymes elevated but <100. Troponins 288, 284. CTAP notes bilat kidney obtructions with stones throughout the renal collecting systems and proximal ureters and 8mm stone in right distal ureter. Also notes mild distention of GB, mild intrahepatic & extrahepatic biliary ductal dilatation and large stool burden with likely fecal impaction.   While in ER, pt received 1L IVF bolus, IV potassium replacement and 80mg protonix IV.   GI & urology consulted from ER.     WORK UP SINCE ARRIVAL:  Results for orders placed or performed during the hospital encounter of 04/21/25   CBC with Auto    Result Value Ref Range    Lactic Acid, Sepsis 0.8 0.5 - 1.9 mmol/L   APTT   Result Value Ref Range    APTT 33.5 24.5 - 35.1 sec   Troponin   Result Value Ref Range    Troponin, High Sensitivity 284 (H) 0 - 14 ng/L   EKG 12 Lead   Result Value Ref Range    Ventricular Rate 101 BPM    Atrial Rate 101 BPM    P-R Interval 150 ms    QRS Duration 74 ms    Q-T Interval 356 ms    QTc Calculation (Bazett) 461 ms    P Axis 5 degrees    R Axis -13 degrees    T Axis 13 degrees   TYPE AND SCREEN   Result Value Ref Range    Blood Bank Sample Expiration 04/24/2025,2359     Arm Band Number [id number]     ABO/Rh O POSITIVE     Antibody Screen NEGATIVE     Unit Number [id number]     Component Leukocyte Reduced Red Cell     Unit Divison 00     Dispense Status Blood Bank ALLOCATED     Transfusion Status OK TO TRANSFUSE     Crossmatch Result COMPATIBLE      CT ABDOMEN PELVIS W IV CONTRAST Additional Contrast? None   Final Result by Josephine Funes MD (04/21 1342)   1. Abnormal appearance of the kidneys bilaterally with obstruction of the   kidneys. There is new air identified in the renal collecting system on the   left with stranding identified surrounding the left kidney and left renal   collecting system as well as the ureter. There is enhancement of the   urothelial lining laterally with stones seen throughout the renal collecting   systems and ureters proximally.  There is a stone identified in the distal   ureter on the right measuring approximately 8 mm.  Suspect bilateral   pyelonephritis/pyelitis left greater than right.  Correlation to urinalysis   is recommended.   2. The bladder is decompressed with Feliz catheter balloon present. There is   wall thickening. Correlation to urinalysis suspecting UTI/cystitis.   3. Increased stool burden seen within the colon with large stool burden seen   in the rectal vault to suggest fecal impaction. Mild wall thickening   identified of the rectum to suggest a mild proctitis/stercoral

## 2025-04-21 NOTE — CONSENT
Informed Consent for Blood Component Transfusion Note    I have discussed with the patient the rationale for blood component transfusion; its benefits in treating or preventing fatigue, organ damage, or death; and its risk which includes mild transfusion reactions, rare risk of blood borne infection, or more serious but rare reactions. I have discussed the alternatives to transfusion, including the risk and consequences of not receiving transfusion. The patient had an opportunity to ask questions and had agreed to proceed with transfusion of blood components.    Electronically signed by Haydee Stein DO on 4/21/25 at 12:22 PM EDT

## 2025-04-21 NOTE — ED PROVIDER NOTES
[facility]  EMERGENCY DEPARTMENT ENCOUNTER        Pt Name: Brigid Moses  MRN: [id number]  Birthdate 1970  Date of evaluation: 4/21/2025  Provider: Haydee Stein DO  PCP: Yessenia Perry MD  Note Started: 11:23 AM EDT 4/21/25    CHIEF COMPLAINT       Chief Complaint   Patient presents with   • GI Bleeding     Sent from nursing home for possible GI bleed, patient states no complaints       HISTORY OF PRESENT ILLNESS: 1 or more Elements     Limitations to history : None    Brigid Moses is a 55 y.o. female with past medical history of iron deficiency anemia, prior history of sepsis due to UTI in February 2025, history of CVA with left-sided hemiplegia and hemiparesis, chronic, history of iron deficiency anemia.  Patient presents to the ED for evaluation of possible GI bleed; patient reports feeling fatigued and reports mild lower abdominal pain and UTI symptoms.  She has a chronic Feliz catheter which was exchanged 3 days ago but reports discomfort and burning in her  region.  She states that she follows with Dr. Gomes for GI and that they are aware that she is here; at her nursing facility today, she reportedly had her stool checked and was told that there was blood in it.  Patient denies seeing any black or bloody stools, but she states that they tested it at the facility, unsure how but possibly fecal occult blood test?  Patient was on Eliquis for a period of time, to treat a DVT in her right arm; she states that she had an ultrasound done 1 week ago and the DVT was resolved so they stopped her Eliquis.  She has not been taking Eliquis for at least 1 week.  Patient states that she has required blood transfusions before, last time about 8 months ago, but she was told that her blood count was low not because of a GI bleed but due to iron deficiency.  She denies any known history of GI bleeds in the past.  She denies any nausea or vomiting.  No chest pain palpitations or shortness of breath.  Denies  any fevers or chills, cough or sore throat.        Nursing Notes were all reviewed and agreed with or any disagreements were addressed in the HPI.      REVIEW OF EXTERNAL NOTE :       Reviewed documentation from hospital encounter 2/13 - 2/19/2025.  She was admitted for sepsis due to UTI.    Chart Review/External Note Review    Last Echo reviewed by Me:  No results found for: \"LVEF\", \"LVEFMODE\"        Controlled Substance Monitoring:    Acute and Chronic Pain Monitoring:   RX Monitoring Attestation   2/22/2019   8:46 AM The Prescription Monitoring Report for this patient was reviewed today.             REVIEW OF SYSTEMS :      Review of Systems   Constitutional:  Positive for fatigue. Negative for chills and fever.   Respiratory:  Negative for cough and shortness of breath.    Cardiovascular:  Negative for chest pain and palpitations.   Gastrointestinal:  Positive for abdominal pain and blood in stool (per SNF). Negative for constipation, diarrhea, nausea and vomiting.   Genitourinary:  Positive for dysuria. Negative for flank pain.       Pertinent positives and negatives are stated within HPI or above, all other systems reviewed and are negative.    SURGICAL HISTORY     Past Surgical History:   Procedure Laterality Date   • BLADDER SURGERY Bilateral 2/11/2022    CYSTOSCOPY RETROGRADE PYELOGRAM BILATERAL  STENT INSERTION performed by Benedicto Eugene DO at [facility] OR   • BLADDER SURGERY Bilateral 4/12/2022    CYSTOSCOPY RETROGRADE PYELOGRAM,  URETEROSCOPY,  LASER LITHOTRIPSY - BILATERAL, WITH BILATERAL STENT EXCHANGE performed by Prince Mays MD at [facility] OR   • COLONOSCOPY     • DEBRIDEMENT Bilateral 2/14/2025    CYSTOSCOPY QUIÑONES INSERTION performed by Leon Mota MD at [facility] OR   • ENDOSCOPY, COLON, DIAGNOSTIC     • FOOT SURGERY      right toes   • FOOT SURGERY Left 3/27/13    correction 4th, 5th toes left foot   • LAPAROTOMY     • LITHOTRIPSY Bilateral 3/8/2022    CYSTOSCOPY RETROGRADE PYELOGRAM URETEROSCOPY,    pyelonephritis/pyelitis left greater than right.  Correlation to urinalysis   is recommended.   2. The bladder is decompressed with Feliz catheter balloon present. There is   wall thickening. Correlation to urinalysis suspecting UTI/cystitis.   3. Increased stool burden seen within the colon with large stool burden seen   in the rectal vault to suggest fecal impaction. Mild wall thickening   identified of the rectum to suggest a mild proctitis/stercoral colitis.   4. Mild distension identified of the gallbladder with mild intrahepatic and   extrahepatic biliary ductal dilatation. Correlation to right upper quadrant   ultrasound can be performed if clinical symptoms exist.   5. Incompletely imaged small noncalcified nodule at the right lung base   measuring 3 mm in size.  No routine follow-up is recommended according to   Fleischner society guidelines.           No results found.    No results found.    PROCEDURES   Unless otherwise noted below, none      PAST MEDICAL HISTORY/Chronic Conditions Affecting Care      has a past medical history of Altered mental status, unspecified, Anemia, iron deficiency, Anemia, unspecified, Cerebral artery occlusion with cerebral infarction (HCC), Dysphagia following cerebral infarction, Gastroesophageal reflux disease, Hemiplegia and hemiparesis following cerebral infarction affecting left non-dominant side (HCC), Hypertension, Intestinal malabsorption, unspecified, Major depressive disorder, recurrent, Major depressive disorder, recurrent (9/14/2022), and Panic attacks.     EMERGENCY DEPARTMENT COURSE    Vitals:    Vitals:    04/21/25 1810 04/21/25 1910 04/21/25 2046 04/21/25 2306   BP: 99/64 107/73 102/75 99/66   Pulse: 98  (!) 101 (!) 104   Resp: 22 20 18   Temp: 98.1 °F (36.7 °C) 98.1 °F (36.7 °C) 98.1 °F (36.7 °C) 98.4 °F (36.9 °C)   TempSrc:  Oral Infrared Oral   SpO2: 97%  100% 96%   Weight:       Height:           Patient was given the following medications:  Medications  order which at this time is n.p.o. for GI purposes as well.    At that point, I did call upstairs to the floor and spoke with the charge nurse on the floor of the patient's room.  I explained to them that the urinalysis results are positive and she needs treated with meropenem which I did order and they will make sure it is administered.  Additionally, they will be sure to keep the patient's n.p.o. order ongoing for both GI and urology evaluation purposes.      Before the patient went upstairs, I did discuss results and plan for admission with her and she voiced understanding and was agreeable.  Medicine was consulted and the patient was accepted for admission.    While not exhaustive, the following diagnoses and their severity were considered: GI bleed, blood loss anemia, type II NSTEMI, dehydration, electrolyte abnormality, sepsis, anemia requiring transfusion, UTI, pyelonephritis, obstructive uropathy, ureteral stones, intra-abdominal infection.      Independent interpretation of Laboratory tests by Haydee Stein DO: Please see ED course for documentation of interpreted abnormal and/or relevant lab results.    Independent interpretation of Radiology tests by Haydee Stein DO: As documented above.        Non-plain film images such as CT, Ultrasound and MRI are read by the radiologist. Plain radiographic images are visualized and preliminarily interpreted by the ED Provider with the above-noted findings. Interpretation per the Radiologist below, if available at the time of this note are included below.      EKG reviewed and interpreted by me, TIME:  This EKG is signed by me, Haydee Stein DO.     See ED course for EKG documentation.    All labs & imaging were reviewed and interpreted by me, see RESULTS. I have personally reviewed all laboratory and imaging results for this patient.       Are there any additional factors to consider that affect care (uninsured, homeless, illiterate, history from

## 2025-04-21 NOTE — CONSULTS
CONSULT  Vincenzo Khan M.D.  [facility]  Dr. Herbert Blanco M.D.,  Dr. Pal Burrell M.D.,  Dr. Leon Pearson D.O.,  Dr. Mekhi Oro D.O. ,  Dr. Pedro Chavarria M.D.,          Brigiddiego Moses  55 y.o.  female      Re: \"GI bleed\"  Requesting physician: Dr. Levi/  Date:3:27 PM 4/21/2025          HPI: 55-year-old female patient seen in the hospital for above described issue.  She has history of iron deficiency anemia as well as history of CVA with hemiparesis/hemiplegia  Patient has previous history of CVA -she has persistent left-sided hemiparesis.  Patient is on oral anticoagulation with Eliquis as well as receiving 81 mg of aspirin daily.  Patient has history of anemia with recently decreasing H&H.  Her most recent upper endoscopy and colonoscopy were in 2020.  Colonoscopy revealed diverticular disease and sigmoid polyp.  Upper endoscopy revealed large hiatal hernia.  Patient has history of hernia repair in 2021.  Patient stool was checked as outpatient and found to be positive for occult blood.  Patient herself otherwise denies having any bright red blood or dark red blood in the stool or any particular dark stool.  She denies nausea vomiting.  She denies abdominal pain.  Patient is able to tolerate a regular diet and she feeds herself independently.  Most recently had abnormal CT scan of the abdomen in February of this yearWhich was reported to show colonic fecal retention in the descending/sigmoid colon with distention of the sigmoid colon with bowel wall thickening.  Repeat CT scan of the abdomen and pelvis today reported to show increase stool burden with mild wall thickening of the rectum.  Additional findings include homogenous liver with mild distention of the gallbladder and mild intrahepatic and extrahepatic biliary ductal dilatation.  Spleen is homogenous and pancreas is reported as unremarkable.  Kidneys are reported to show evidence of chronic obstruction.  Including air in the  05/20/2014       Meds:  Current Facility-Administered Medications   Medication Dose Route Frequency Provider Last Rate Last Admin    0.9 % sodium chloride infusion   IntraVENous PRN Haydee Stein DO        potassium chloride 10 mEq/100 mL IVPB (Peripheral Line)  10 mEq IntraVENous Q1H Haydee Stein DO        sodium bicarbonate 150 mEq in dextrose 5 % 1,000 mL infusion   IntraVENous Continuous Wili Walker DO         Current Outpatient Medications   Medication Sig Dispense Refill    apixaban (ELIQUIS) 5 MG TABS tablet Take 2 tabs po BID x 7 days, then take 1 tab po BID x 23 days.      amitriptyline (ELAVIL) 25 MG tablet Take 1 tablet by mouth nightly      aspirin 81 MG chewable tablet Take 1 tablet by mouth daily      polycarbophil (FIBERCON) 625 MG tablet Take 1 tablet by mouth in the morning and at bedtime      hydrOXYzine HCl (ATARAX) 25 MG tablet Take 1 tablet by mouth in the morning, at noon, and at bedtime      lisinopril (PRINIVIL;ZESTRIL) 2.5 MG tablet Take 1 tablet by mouth daily Hold for SBP less than 100      magnesium oxide (MAG-OX) 400 (240 Mg) MG tablet Take 1 tablet by mouth daily      megestrol (MEGACE) 20 MG tablet Take 1 tablet by mouth daily      famotidine (PEPCID) 20 MG tablet Take 1 tablet by mouth daily      ibuprofen (ADVIL;MOTRIN) 600 MG tablet Take 1 tablet by mouth every 8 hours as needed for Pain 20 tablet 0    phenazopyridine (PYRIDIUM) 100 MG tablet Take 1 tablet by mouth 3 times daily as needed for Pain      mirtazapine (REMERON) 15 MG tablet Take 1 tablet by mouth daily Indications: appetite stimulant      Cholecalciferol (VITAMIN D) 50 MCG (2000 UT) CAPS capsule Take by mouth daily      ibuprofen (ADVIL;MOTRIN) 600 MG tablet Take 1 tablet by mouth every 8 hours as needed for Pain 20 tablet 1    magnesium hydroxide (MILK OF MAGNESIA CONCENTRATE) 2400 MG/10ML SUSP Take 10 mLs by mouth once as needed      docusate sodium (COLACE) 100 MG capsule Take 1 capsule by  Him/He

## 2025-04-22 ENCOUNTER — ANESTHESIA EVENT (OUTPATIENT)
Dept: OPERATING ROOM | Age: 55
DRG: 659 | End: 2025-04-22
Payer: COMMERCIAL

## 2025-04-22 ENCOUNTER — APPOINTMENT (OUTPATIENT)
Dept: GENERAL RADIOLOGY | Age: 55
DRG: 659 | End: 2025-04-22
Payer: COMMERCIAL

## 2025-04-22 ENCOUNTER — ANESTHESIA (OUTPATIENT)
Dept: OPERATING ROOM | Age: 55
DRG: 659 | End: 2025-04-22
Payer: COMMERCIAL

## 2025-04-22 PROBLEM — N17.9 SEPSIS DUE TO ESCHERICHIA COLI WITH ACUTE RENAL FAILURE WITHOUT SEPTIC SHOCK (HCC): Status: ACTIVE | Noted: 2025-04-22

## 2025-04-22 PROBLEM — A41.51 SEPSIS DUE TO ESCHERICHIA COLI WITH ACUTE RENAL FAILURE WITHOUT SEPTIC SHOCK (HCC): Status: ACTIVE | Noted: 2025-04-22

## 2025-04-22 PROBLEM — I24.89 DEMAND ISCHEMIA (HCC): Status: ACTIVE | Noted: 2025-04-22

## 2025-04-22 PROBLEM — R65.20 SEPSIS DUE TO ESCHERICHIA COLI WITH ACUTE RENAL FAILURE WITHOUT SEPTIC SHOCK (HCC): Status: ACTIVE | Noted: 2025-04-22

## 2025-04-22 LAB
ABO/RH: NORMAL
ANION GAP SERPL CALCULATED.3IONS-SCNC: 13 MMOL/L (ref 7–16)
ANION GAP SERPL CALCULATED.3IONS-SCNC: 13 MMOL/L (ref 7–16)
ANTIBODY SCREEN: NEGATIVE
ARM BAND NUMBER: NORMAL
BLOOD BANK BLOOD PRODUCT EXPIRATION DATE: NORMAL
BLOOD BANK DISPENSE STATUS: NORMAL
BLOOD BANK ISBT PRODUCT BLOOD TYPE: 5100
BLOOD BANK PRODUCT CODE: NORMAL
BLOOD BANK SAMPLE EXPIRATION: NORMAL
BLOOD BANK UNIT TYPE AND RH: NORMAL
BPU ID: NORMAL
BUN SERPL-MCNC: 15 MG/DL (ref 6–20)
BUN SERPL-MCNC: 19 MG/DL (ref 6–20)
CALCIUM SERPL-MCNC: 8.5 MG/DL (ref 8.6–10.2)
CALCIUM SERPL-MCNC: 8.5 MG/DL (ref 8.6–10.2)
CHLORIDE SERPL-SCNC: 105 MMOL/L (ref 98–107)
CHLORIDE SERPL-SCNC: 108 MMOL/L (ref 98–107)
CO2 SERPL-SCNC: 16 MMOL/L (ref 22–29)
CO2 SERPL-SCNC: 20 MMOL/L (ref 22–29)
COMPONENT: NORMAL
CREAT SERPL-MCNC: 1.1 MG/DL (ref 0.5–1)
CREAT SERPL-MCNC: 1.1 MG/DL (ref 0.5–1)
CROSSMATCH RESULT: NORMAL
FOLATE SERPL-MCNC: 2.6 NG/ML (ref 4.6–34.8)
GFR, ESTIMATED: 57 ML/MIN/1.73M2
GFR, ESTIMATED: 61 ML/MIN/1.73M2
GLUCOSE SERPL-MCNC: 101 MG/DL (ref 74–99)
GLUCOSE SERPL-MCNC: 77 MG/DL (ref 74–99)
HCT VFR BLD AUTO: 27.1 % (ref 34–48)
HCT VFR BLD AUTO: 30.9 % (ref 34–48)
HGB BLD-MCNC: 10 G/DL (ref 11.5–15.5)
HGB BLD-MCNC: 8.9 G/DL (ref 11.5–15.5)
MAGNESIUM SERPL-MCNC: 1.5 MG/DL (ref 1.6–2.6)
MICROORGANISM SPEC CULT: ABNORMAL
MICROORGANISM SPEC CULT: ABNORMAL
POTASSIUM SERPL-SCNC: 3.1 MMOL/L (ref 3.5–5)
POTASSIUM SERPL-SCNC: 3.8 MMOL/L (ref 3.5–5)
SODIUM SERPL-SCNC: 137 MMOL/L (ref 132–146)
SODIUM SERPL-SCNC: 138 MMOL/L (ref 132–146)
SPECIMEN DESCRIPTION: ABNORMAL
TRANSFUSION STATUS: NORMAL
UNIT DIVISION: 0
UNIT ISSUE DATE/TIME: NORMAL
VIT B12 SERPL-MCNC: 427 PG/ML (ref 232–1245)

## 2025-04-22 PROCEDURE — 2580000003 HC RX 258: Performed by: HOSPITALIST

## 2025-04-22 PROCEDURE — 99233 SBSQ HOSP IP/OBS HIGH 50: CPT | Performed by: INTERNAL MEDICINE

## 2025-04-22 PROCEDURE — 3600000003 HC SURGERY LEVEL 3 BASE: Performed by: UROLOGY

## 2025-04-22 PROCEDURE — 87086 URINE CULTURE/COLONY COUNT: CPT

## 2025-04-22 PROCEDURE — 36415 COLL VENOUS BLD VENIPUNCTURE: CPT

## 2025-04-22 PROCEDURE — 2500000003 HC RX 250 WO HCPCS: Performed by: ANESTHESIOLOGY

## 2025-04-22 PROCEDURE — 83735 ASSAY OF MAGNESIUM: CPT

## 2025-04-22 PROCEDURE — BT141ZZ FLUOROSCOPY OF KIDNEYS, URETERS AND BLADDER USING LOW OSMOLAR CONTRAST: ICD-10-PCS | Performed by: UROLOGY

## 2025-04-22 PROCEDURE — C1758 CATHETER, URETERAL: HCPCS | Performed by: UROLOGY

## 2025-04-22 PROCEDURE — 3700000000 HC ANESTHESIA ATTENDED CARE: Performed by: UROLOGY

## 2025-04-22 PROCEDURE — 6360000002 HC RX W HCPCS: Performed by: STUDENT IN AN ORGANIZED HEALTH CARE EDUCATION/TRAINING PROGRAM

## 2025-04-22 PROCEDURE — 3600000013 HC SURGERY LEVEL 3 ADDTL 15MIN: Performed by: UROLOGY

## 2025-04-22 PROCEDURE — 2709999900 HC NON-CHARGEABLE SUPPLY: Performed by: UROLOGY

## 2025-04-22 PROCEDURE — APPSS30 APP SPLIT SHARED TIME 16-30 MINUTES: Performed by: NURSE PRACTITIONER

## 2025-04-22 PROCEDURE — C2617 STENT, NON-COR, TEM W/O DEL: HCPCS | Performed by: UROLOGY

## 2025-04-22 PROCEDURE — 2060000000 HC ICU INTERMEDIATE R&B

## 2025-04-22 PROCEDURE — 6370000000 HC RX 637 (ALT 250 FOR IP): Performed by: NURSE PRACTITIONER

## 2025-04-22 PROCEDURE — 2580000003 HC RX 258: Performed by: STUDENT IN AN ORGANIZED HEALTH CARE EDUCATION/TRAINING PROGRAM

## 2025-04-22 PROCEDURE — 6360000002 HC RX W HCPCS: Performed by: INTERNAL MEDICINE

## 2025-04-22 PROCEDURE — 85014 HEMATOCRIT: CPT

## 2025-04-22 PROCEDURE — C1769 GUIDE WIRE: HCPCS | Performed by: UROLOGY

## 2025-04-22 PROCEDURE — 6360000002 HC RX W HCPCS: Performed by: NURSE ANESTHETIST, CERTIFIED REGISTERED

## 2025-04-22 PROCEDURE — 0T788DZ DILATION OF BILATERAL URETERS WITH INTRALUMINAL DEVICE, VIA NATURAL OR ARTIFICIAL OPENING ENDOSCOPIC: ICD-10-PCS | Performed by: UROLOGY

## 2025-04-22 PROCEDURE — 85018 HEMOGLOBIN: CPT

## 2025-04-22 PROCEDURE — 3700000001 HC ADD 15 MINUTES (ANESTHESIA): Performed by: UROLOGY

## 2025-04-22 PROCEDURE — 2500000003 HC RX 250 WO HCPCS: Performed by: NURSE PRACTITIONER

## 2025-04-22 PROCEDURE — 6360000002 HC RX W HCPCS: Performed by: HOSPITALIST

## 2025-04-22 PROCEDURE — 2500000003 HC RX 250 WO HCPCS: Performed by: INTERNAL MEDICINE

## 2025-04-22 PROCEDURE — 2580000003 HC RX 258: Performed by: INTERNAL MEDICINE

## 2025-04-22 PROCEDURE — 2580000003 HC RX 258: Performed by: NURSE ANESTHETIST, CERTIFIED REGISTERED

## 2025-04-22 PROCEDURE — 7100000001 HC PACU RECOVERY - ADDTL 15 MIN: Performed by: UROLOGY

## 2025-04-22 PROCEDURE — 7100000000 HC PACU RECOVERY - FIRST 15 MIN: Performed by: UROLOGY

## 2025-04-22 PROCEDURE — 80048 BASIC METABOLIC PNL TOTAL CA: CPT

## 2025-04-22 DEVICE — URETERAL STENT
Type: IMPLANTABLE DEVICE | Site: URETER | Status: FUNCTIONAL
Brand: PERCUFLEX™

## 2025-04-22 RX ORDER — SODIUM CHLORIDE, SODIUM LACTATE, POTASSIUM CHLORIDE, CALCIUM CHLORIDE 600; 310; 30; 20 MG/100ML; MG/100ML; MG/100ML; MG/100ML
INJECTION, SOLUTION INTRAVENOUS CONTINUOUS
Status: DISCONTINUED | OUTPATIENT
Start: 2025-04-22 | End: 2025-04-22

## 2025-04-22 RX ORDER — FENTANYL CITRATE 0.05 MG/ML
25 INJECTION, SOLUTION INTRAMUSCULAR; INTRAVENOUS EVERY 5 MIN PRN
Status: DISCONTINUED | OUTPATIENT
Start: 2025-04-22 | End: 2025-04-22 | Stop reason: HOSPADM

## 2025-04-22 RX ORDER — DIPHENHYDRAMINE HYDROCHLORIDE 50 MG/ML
12.5 INJECTION, SOLUTION INTRAMUSCULAR; INTRAVENOUS
Status: DISCONTINUED | OUTPATIENT
Start: 2025-04-22 | End: 2025-04-25 | Stop reason: HOSPADM

## 2025-04-22 RX ORDER — SODIUM CHLORIDE 9 MG/ML
INJECTION, SOLUTION INTRAVENOUS
Status: DISCONTINUED | OUTPATIENT
Start: 2025-04-22 | End: 2025-04-22 | Stop reason: SDUPTHER

## 2025-04-22 RX ORDER — MIDAZOLAM HYDROCHLORIDE 1 MG/ML
2 INJECTION, SOLUTION INTRAMUSCULAR; INTRAVENOUS
Status: DISCONTINUED | OUTPATIENT
Start: 2025-04-22 | End: 2025-04-25 | Stop reason: HOSPADM

## 2025-04-22 RX ORDER — LIDOCAINE HYDROCHLORIDE 20 MG/ML
INJECTION, SOLUTION INTRAVENOUS
Status: DISCONTINUED | OUTPATIENT
Start: 2025-04-22 | End: 2025-04-22 | Stop reason: SDUPTHER

## 2025-04-22 RX ORDER — LIDOCAINE HYDROCHLORIDE 10 MG/ML
50 INJECTION, SOLUTION INFILTRATION; PERINEURAL ONCE
Status: DISCONTINUED | OUTPATIENT
Start: 2025-04-22 | End: 2025-04-25 | Stop reason: HOSPADM

## 2025-04-22 RX ORDER — PROPOFOL 10 MG/ML
INJECTION, EMULSION INTRAVENOUS
Status: DISCONTINUED | OUTPATIENT
Start: 2025-04-22 | End: 2025-04-22 | Stop reason: SDUPTHER

## 2025-04-22 RX ORDER — PROCHLORPERAZINE EDISYLATE 5 MG/ML
5 INJECTION INTRAMUSCULAR; INTRAVENOUS
Status: DISCONTINUED | OUTPATIENT
Start: 2025-04-22 | End: 2025-04-25 | Stop reason: HOSPADM

## 2025-04-22 RX ORDER — SODIUM CHLORIDE 9 MG/ML
INJECTION, SOLUTION INTRAVENOUS PRN
Status: DISCONTINUED | OUTPATIENT
Start: 2025-04-22 | End: 2025-04-25 | Stop reason: HOSPADM

## 2025-04-22 RX ORDER — DROPERIDOL 2.5 MG/ML
0.62 INJECTION, SOLUTION INTRAMUSCULAR; INTRAVENOUS
Status: DISCONTINUED | OUTPATIENT
Start: 2025-04-22 | End: 2025-04-22 | Stop reason: HOSPADM

## 2025-04-22 RX ORDER — MIDAZOLAM HYDROCHLORIDE 1 MG/ML
INJECTION, SOLUTION INTRAMUSCULAR; INTRAVENOUS
Status: DISCONTINUED | OUTPATIENT
Start: 2025-04-22 | End: 2025-04-22 | Stop reason: SDUPTHER

## 2025-04-22 RX ORDER — IPRATROPIUM BROMIDE AND ALBUTEROL SULFATE 2.5; .5 MG/3ML; MG/3ML
1 SOLUTION RESPIRATORY (INHALATION)
Status: DISCONTINUED | OUTPATIENT
Start: 2025-04-22 | End: 2025-04-22 | Stop reason: HOSPADM

## 2025-04-22 RX ORDER — NALOXONE HYDROCHLORIDE 0.4 MG/ML
INJECTION, SOLUTION INTRAMUSCULAR; INTRAVENOUS; SUBCUTANEOUS PRN
Status: DISCONTINUED | OUTPATIENT
Start: 2025-04-22 | End: 2025-04-25 | Stop reason: HOSPADM

## 2025-04-22 RX ORDER — HYDRALAZINE HYDROCHLORIDE 20 MG/ML
10 INJECTION INTRAMUSCULAR; INTRAVENOUS
Status: DISCONTINUED | OUTPATIENT
Start: 2025-04-22 | End: 2025-04-22 | Stop reason: HOSPADM

## 2025-04-22 RX ORDER — FENTANYL CITRATE 50 UG/ML
INJECTION, SOLUTION INTRAMUSCULAR; INTRAVENOUS
Status: DISCONTINUED | OUTPATIENT
Start: 2025-04-22 | End: 2025-04-22 | Stop reason: SDUPTHER

## 2025-04-22 RX ORDER — LABETALOL HYDROCHLORIDE 5 MG/ML
10 INJECTION, SOLUTION INTRAVENOUS
Status: DISCONTINUED | OUTPATIENT
Start: 2025-04-22 | End: 2025-04-22 | Stop reason: HOSPADM

## 2025-04-22 RX ORDER — SODIUM CHLORIDE 0.9 % (FLUSH) 0.9 %
5-40 SYRINGE (ML) INJECTION EVERY 12 HOURS SCHEDULED
Status: DISCONTINUED | OUTPATIENT
Start: 2025-04-22 | End: 2025-04-25 | Stop reason: HOSPADM

## 2025-04-22 RX ORDER — MAGNESIUM SULFATE IN WATER 40 MG/ML
4000 INJECTION, SOLUTION INTRAVENOUS ONCE
Status: COMPLETED | OUTPATIENT
Start: 2025-04-22 | End: 2025-04-22

## 2025-04-22 RX ORDER — SODIUM CHLORIDE 0.9 % (FLUSH) 0.9 %
5-40 SYRINGE (ML) INJECTION PRN
Status: DISCONTINUED | OUTPATIENT
Start: 2025-04-22 | End: 2025-04-25 | Stop reason: HOSPADM

## 2025-04-22 RX ORDER — POTASSIUM CHLORIDE 7.45 MG/ML
10 INJECTION INTRAVENOUS
Status: COMPLETED | OUTPATIENT
Start: 2025-04-22 | End: 2025-04-22

## 2025-04-22 RX ORDER — 0.9 % SODIUM CHLORIDE 0.9 %
500 INTRAVENOUS SOLUTION INTRAVENOUS ONCE
Status: COMPLETED | OUTPATIENT
Start: 2025-04-22 | End: 2025-04-22

## 2025-04-22 RX ADMIN — SODIUM CHLORIDE: 9 INJECTION, SOLUTION INTRAVENOUS at 10:00

## 2025-04-22 RX ADMIN — PROPOFOL 100 MCG/KG/MIN: 10 INJECTION, EMULSION INTRAVENOUS at 10:13

## 2025-04-22 RX ADMIN — MEROPENEM 1000 MG: 1 INJECTION INTRAVENOUS at 23:36

## 2025-04-22 RX ADMIN — POTASSIUM CHLORIDE 10 MEQ: 7.46 INJECTION, SOLUTION INTRAVENOUS at 13:59

## 2025-04-22 RX ADMIN — PANTOPRAZOLE SODIUM 40 MG: 40 INJECTION, POWDER, FOR SOLUTION INTRAVENOUS at 02:31

## 2025-04-22 RX ADMIN — SODIUM CHLORIDE 500 ML: 0.9 INJECTION, SOLUTION INTRAVENOUS at 21:18

## 2025-04-22 RX ADMIN — FENTANYL CITRATE 50 MCG: 50 INJECTION, SOLUTION INTRAMUSCULAR; INTRAVENOUS at 10:45

## 2025-04-22 RX ADMIN — MIRTAZAPINE 15 MG: 15 TABLET, FILM COATED ORAL at 20:24

## 2025-04-22 RX ADMIN — FENTANYL CITRATE 50 MCG: 50 INJECTION, SOLUTION INTRAMUSCULAR; INTRAVENOUS at 10:12

## 2025-04-22 RX ADMIN — POTASSIUM CHLORIDE 10 MEQ: 7.46 INJECTION, SOLUTION INTRAVENOUS at 12:54

## 2025-04-22 RX ADMIN — POTASSIUM CHLORIDE 10 MEQ: 7.46 INJECTION, SOLUTION INTRAVENOUS at 09:24

## 2025-04-22 RX ADMIN — SODIUM BICARBONATE: 84 INJECTION INTRAVENOUS at 17:54

## 2025-04-22 RX ADMIN — AMITRIPTYLINE HYDROCHLORIDE 25 MG: 25 TABLET, FILM COATED ORAL at 20:24

## 2025-04-22 RX ADMIN — FENTANYL CITRATE 25 MCG: 50 INJECTION, SOLUTION INTRAMUSCULAR; INTRAVENOUS at 10:17

## 2025-04-22 RX ADMIN — HYDROXYZINE HYDROCHLORIDE 10 MG: 10 TABLET, FILM COATED ORAL at 20:24

## 2025-04-22 RX ADMIN — MIDAZOLAM 2 MG: 1 INJECTION INTRAMUSCULAR; INTRAVENOUS at 10:06

## 2025-04-22 RX ADMIN — SODIUM CHLORIDE, PRESERVATIVE FREE 10 ML: 5 INJECTION INTRAVENOUS at 20:26

## 2025-04-22 RX ADMIN — PANTOPRAZOLE SODIUM 40 MG: 40 INJECTION, POWDER, FOR SOLUTION INTRAVENOUS at 16:10

## 2025-04-22 RX ADMIN — PHENYLEPHRINE HYDROCHLORIDE 200 MCG: 10 INJECTION INTRAVENOUS at 10:24

## 2025-04-22 RX ADMIN — FENTANYL CITRATE 25 MCG: 50 INJECTION, SOLUTION INTRAMUSCULAR; INTRAVENOUS at 10:18

## 2025-04-22 RX ADMIN — ACETAMINOPHEN 650 MG: 325 TABLET ORAL at 20:25

## 2025-04-22 RX ADMIN — MEROPENEM 2000 MG: 1 INJECTION INTRAVENOUS at 04:47

## 2025-04-22 RX ADMIN — MEROPENEM 1000 MG: 1 INJECTION INTRAVENOUS at 12:13

## 2025-04-22 RX ADMIN — PHENYLEPHRINE HYDROCHLORIDE 200 MCG: 10 INJECTION INTRAVENOUS at 10:30

## 2025-04-22 RX ADMIN — MAGNESIUM SULFATE HEPTAHYDRATE 4000 MG: 40 INJECTION, SOLUTION INTRAVENOUS at 16:07

## 2025-04-22 RX ADMIN — PROPOFOL 50 MG: 10 INJECTION, EMULSION INTRAVENOUS at 10:12

## 2025-04-22 RX ADMIN — HYDROXYZINE HYDROCHLORIDE 10 MG: 10 TABLET, FILM COATED ORAL at 16:20

## 2025-04-22 RX ADMIN — LIDOCAINE HYDROCHLORIDE 100 MG: 20 INJECTION, SOLUTION INTRAVENOUS at 10:12

## 2025-04-22 RX ADMIN — PROPOFOL 20 MG: 10 INJECTION, EMULSION INTRAVENOUS at 10:14

## 2025-04-22 RX ADMIN — SODIUM CHLORIDE: 9 INJECTION, SOLUTION INTRAVENOUS at 10:33

## 2025-04-22 RX ADMIN — POTASSIUM CHLORIDE 10 MEQ: 7.46 INJECTION, SOLUTION INTRAVENOUS at 12:00

## 2025-04-22 ASSESSMENT — LIFESTYLE VARIABLES: SMOKING_STATUS: 1

## 2025-04-22 ASSESSMENT — PAIN - FUNCTIONAL ASSESSMENT: PAIN_FUNCTIONAL_ASSESSMENT: NONE - DENIES PAIN

## 2025-04-22 ASSESSMENT — PAIN SCALES - GENERAL: PAINLEVEL_OUTOF10: 3

## 2025-04-22 NOTE — ANESTHESIA POSTPROCEDURE EVALUATION
Department of Anesthesiology  Postprocedure Note    Patient: Brigid Moses  MRN: [id number]  YOB: 1970  Date of evaluation: 4/22/2025    Procedure Summary       Date: 04/22/25 Room / Location: [address]    Anesthesia Start: 1000 Anesthesia Stop: 1105    Procedure: CYSTOSCOPY RETROGRADE PYELOGRAM BILATERAL STENT INSERTION (Bilateral: Ureter) Diagnosis:       Bilateral kidney stones      (Bilateral kidney stones [N20.0])    Surgeons: López Mays MD Responsible Provider: Hi Meza MD    Anesthesia Type: MAC ASA Status: 3            Anesthesia Type: No value filed.    Radha Phase I: Radha Score: 8    Radha Phase II:      Anesthesia Post Evaluation    Patient location during evaluation: PACU  Patient participation: complete - patient participated  Level of consciousness: awake  Airway patency: patent  Nausea & Vomiting: no nausea and no vomiting  Cardiovascular status: hemodynamically stable  Respiratory status: acceptable  Hydration status: euvolemic  Pain management: adequate    No notable events documented.

## 2025-04-22 NOTE — PROGRESS NOTES
[facility] Hospitalist   Progress Note    Admitting Date and Time: 4/21/2025 10:49 AM  Admit Dx: Acute upper GI hemorrhage [K92.2]      Subjective:    4/22: Pt awake, A&O, lying in bed in no apparent distress. RN reports that potassium replacement was not administered yesterday. BC's + for GNR x2 bottles, will start meropenem & consult ID. Pt denies CP, SOB, n/v/d/, fever or chills.  Stable overnight. No other overnight issues reported.     ROS: Denies fever, chills, cp, sob, n/v, HA unless stated above.     Objective:    BP (!) 100/59   Pulse 97   Temp 98.3 °F (36.8 °C) (Infrared)   Resp 17   Ht 1.575 m (5' 2\")   Wt 45.4 kg (100 lb)   LMP  (LMP Unknown)   SpO2 96%   BMI 18.29 kg/m²     PHYSICAL EXAM  Constitutional: No fever, chills, diaphoresis  Skin: No visible skin rash or erythema, no visible skin breakdown  HEENT: Unremarkable; mucous membranes are moist  Neck: No JVD, lymphadenopathy, thyromegaly  Cardiovascular: Normal rate, normal rhythm to auscultation. S1, S2 normal.  No S3 or rubs appreciated. No carotid bruits appreciated.  Respiratory: Clear to auscultation bilaterally. No respiratory distress. Currently on room air.  Gastrointestinal: Soft, non-tender, non-distended. Active bowel sounds  Genitourinary: No CVA tenderness  Extremities: No clubbing or cyanosis. No edema  Neurological: Awake, alert, oriented x 3. LUE, LLE flaccid, left hand contracted  Psychological: Appropriate affect.   Feliz catheter draining pale yellow, cloudy urine with sediment    Assessment & Plan:    Acute upper GI hemorrhage superimposed on chronic TIFFANIE  -GI following; EGD was planned for later today-postponed for cardiac clearance in light of elevated troponins. ECHO ordered & cardiology consulted  -had been on apixaban for DVT, but this was stopped ~1 week ago when US confirmed resolution of DVT  -holding home asa  -continue IV PPI BID   -iron studies low, ferritin >1000  -continue sodium bicarb  present.     Signed:  Electronically signed by IKE Dukes NP on 4/22/2025 at 7:24 AM  [facility] Healthcare[facility]

## 2025-04-22 NOTE — PROGRESS NOTES
Pharmacy Note - Renal Dosing    Meropenem 1g Q8h for treatment of Bloodstream infection, urinary tract infection. Per [facility] Renal Dose Adjustment Policy, meropenem was changed to 2000mg load followed by 1000mg Q12h extended infusion        Please call with any questions.    Thank you,    Imani Smiley, [facility]

## 2025-04-22 NOTE — PROGRESS NOTES
Spiritual Health History and Assessment/Progress Note  [facility]    Loneliness/Social Isolation,  ,  ,      Name: Brigid Moses MRN: [id number]    Age: 55 y.o.     Sex: female   Language: English   Adventism: Holiness   Acute upper GI hemorrhage     Date: 4/22/2025                           Spiritual Assessment began in  [facility] OR        Referral/Consult From: (P) Multi-disciplinary team   Encounter Overview/Reason: Loneliness/Social Isolation  Service Provided For: (P) Patient    Kelsey, Belief, Meaning:   Patient identifies as spiritual  Family/Friends No family/friends present      Importance and Influence:  Patient has spiritual/personal beliefs that influence decisions regarding their health  Family/Friends No family/friends present    Community:  Patient feels well-supported. Support system includes: Children  Family/Friends No family/friends present    Assessment and Plan of Care:     Patient Interventions include: Facilitated expression of thoughts and feelings, Explored spiritual coping/struggle/distress, and Affirmed coping skills/support systems  Family/Friends Interventions include: No family/friends present    Patient Plan of Care: Spiritual Care available upon further referral  Family/Friends Plan of Care: No family/friends present    Electronically signed by JABARI Coleman on 4/22/2025 at 10:26 AM

## 2025-04-22 NOTE — ANESTHESIA PRE PROCEDURE
Department of Anesthesiology  Preprocedure Note       Name:  Brigid Moses   Age:  55 y.o.  :  1970                                          MRN:  [id number]         Date:  2025      Surgeon: Surgeon(s):  López Mays MD    Procedure: Procedure(s):  CYSTOSCOPY RETROGRADE PYELOGRAM BILATERAL STENT INSERTION    Medications prior to admission:   Prior to Admission medications    Medication Sig Start Date End Date Taking? Authorizing Provider   MELATONIN PO Take 8 mg by mouth nightly   Yes Ruchi Bowers MD   sucralfate (CARAFATE) 1 GM tablet Take 1 tablet by mouth 4 times daily   Yes Ruchi Bowers MD   vitamin B-12 (CYANOCOBALAMIN) 1000 MCG tablet Take 1 tablet by mouth daily   Yes Ruchi Bowers MD   LACTOBACILLUS PO Take 1 capsule by mouth daily   Yes Ruchi Bowers MD   linaCLOtide (LINZESS) 72 MCG CAPS capsule Take 1 capsule by mouth every morning (before breakfast)   Yes Ruchi Bowers MD   pantoprazole (PROTONIX) 40 MG tablet Take 1 tablet by mouth in the morning and at bedtime   Yes ProviderRuchi MD   aspirin 81 MG EC tablet Take 1 tablet by mouth daily   Yes Ruchi Bowers MD   hydrOXYzine HCl (ATARAX) 10 MG tablet Take 1 tablet by mouth in the morning, at noon, and at bedtime   Yes Ruchi Bowers MD   lisinopril (PRINIVIL;ZESTRIL) 2.5 MG tablet Take 1 tablet by mouth daily Hold for SBP less than 100   Yes Ruchi Bowers MD   megestrol (MEGACE) 20 MG tablet Take 1 tablet by mouth daily   Yes Ruchi Bowers MD   famotidine (PEPCID) 20 MG tablet Take 1 tablet by mouth 2 times daily   Yes Ruchi Bowers MD   mirtazapine (REMERON) 15 MG tablet Take 1 tablet by mouth daily   Yes Ruchi Bowers MD   Cholecalciferol (VITAMIN D) 50 MCG (2000 UT) CAPS capsule Take by mouth daily   Yes Ruchi Bowers MD   ferrous sulfate (IRON 325) 325 (65 Fe) MG tablet Take 1 tablet by mouth daily (with breakfast)   Yes Elissa

## 2025-04-22 NOTE — PROGRESS NOTES
PROGRESS NOTE  By Vincenzo Khan M.D.    [facility]  Dr. Herbert Blanco M.D.,  Dr. Pal Burrell M.D.,   Dr. Leon Pearson D.O.,  Dr. Pedro Chavarria M.D.,  ANSON WhitmanO.,          Brigid Aurelia  55 y.o.  female    SUBJECTIVE:  No new complaints.  Reports bowel movement yesterday.  Denies bleeding    OBJECTIVE:    BP 96/65   Pulse (!) 108   Temp 98.2 °F (36.8 °C) (Oral)   Resp 22   Ht 1.575 m (5' 2\")   Wt 45.4 kg (100 lb)   LMP  (LMP Unknown)   SpO2 96%   BMI 18.29 kg/m²     General: NAD/adult  female  HEENT: Anicteric sclera/moist oral mucosa  Neck: Supple/trachea midline  Chest: CTAB/symmetric excursions  Cor: Regular/S1-S2  Abd.: Soft/nontender  Extr.:  BLE edema  Skin: Warm and dry/anicteric      DATA:    Monitor data reviewed -sinus rhythm noted.       Lab Results   Component Value Date/Time    WBC 11.0 04/21/2025 11:25 AM    RBC 2.78 04/21/2025 11:25 AM    HGB 8.9 04/22/2025 04:26 AM    HCT 27.1 04/22/2025 04:26 AM    MCV 87.4 04/21/2025 11:25 AM    MCH 28.4 04/21/2025 11:25 AM    MCHC 32.5 04/21/2025 11:25 AM    RDW 16.8 04/21/2025 11:25 AM     04/21/2025 11:25 AM    MPV 9.5 04/21/2025 11:25 AM     Lab Results   Component Value Date/Time     04/22/2025 04:26 AM    K 3.1 04/22/2025 04:26 AM     04/22/2025 04:26 AM    CO2 16 04/22/2025 04:26 AM    BUN 19 04/22/2025 04:26 AM    CREATININE 1.1 04/22/2025 04:26 AM    CALCIUM 8.5 04/22/2025 04:26 AM    BILITOT 0.2 04/21/2025 11:25 AM    ALKPHOS 144 04/21/2025 11:25 AM    AST 94 04/21/2025 11:25 AM    ALT 78 04/21/2025 11:25 AM     Lab Results   Component Value Date/Time    LIPASE 17 04/21/2025 11:25 AM     Lab Results   Component Value Date/Time    AMYLASE 109 03/16/2020 01:49 PM         ASSESSMENT/PLAN:  Patient Active Problem List   Diagnosis    Hammer toe, acquired    Altered mental status    Anemia, unspecified    Dysphagia following cerebral infarction    Hemiplegia and hemiparesis following cerebral

## 2025-04-22 NOTE — OP NOTE
[place] [id number]                            OPERATIVE REPORT      PATIENT NAME: ZONIA ALLEN                  : 1970  MED REC NO: [id number]                        ROOM: 0634  ACCOUNT NO: [id number]                       ADMIT DATE: 2025  PROVIDER: Nadiya Mays MD      DATE OF PROCEDURE:  2025    SURGEON:  Nadiya Mays MD    POSTOPERATIVE DIAGNOSES:  Bilateral ureteral and renal calculi with urinary tract infection and sepsis.    OPERATIONS:  Cystopanendoscopy, retrograde pyelogram, bilateral stent placement.    ESTIMATED BLOOD LOSS:  Less than 50.    ANESTHESIA:  LMAC.    DESCRIPTION OF PROCEDURE:  With the patient in the lithotomy position, under satisfactory sedation, the genitalia were prepped and draped in a sterile manner.  A 21-Qatari panendoscope was passed into the bladder.  Inspection revealed a very distorted trigone.  The ureteral orifices were difficult to visualize.  The bladder itself was 1 to 2+ trabeculated.  The mucosal pattern showed inflammation, but otherwise did not show any obvious bladder tumors.  Using a right-angled lens, I was able to locate the ureteral orifices.  On the right side, there were several stones in the ureter with obstruction, mainly in the midportion, I was able to bypass it with the wire and placed a 26 cm x 4.8 stent.  On the left side, there did not appear to be any ureteral obstruction, but was hydronephrotic with stones in the kidney.  When the catheter passed up to the left side, purulent urine was obtained.  A 26 x 4.8 stent was placed on this side.  The patient's bladder was drained with a 16-Qatari Feliz.  She was sent to the recovery room in satisfactory condition.          NADIYA MAYS MD      D:  2025 11:10:30     T:  2025 12:42:43     SOSA/TRAV  Job #:  [id number]     Doc#:  [id number]

## 2025-04-22 NOTE — CONSULTS
Today's Date: 4/22/2025  Patient Name: Brigid Moses  Date of admission: 4/21/2025 10:49 AM  Patient's age: 55 y.o., 1970female    Admission Dx: Acute upper GI hemorrhage [K92.2]    Requesting Physician: iWli Walker DO    Chief Complaint   Patient presents with    GI Bleeding     Sent from nursing home for possible GI bleed, patient states no complaints       HISTORY OF PRESENT ILLNESS:      55-year-old patient that has history of old CVA with left-sided hemiplegia, anemia, recurrent UTI, and no previous known cardiac problems, had bilateral ureteral stents secondary to UTI, sepsis, and obstructive uropathy.  She is also anemic.  GI is consulted  She has been on Eliquis because of history of DVT and vulnerability for thrombosis.  Patient denies any chest pain or shortness of breath at rest.  Troponin was markedly elevated above 280.    REVIEW OF SYSTEMS:    A comprehensive review of systems is negative except for what is reported above      Past Medical History:   has a past medical history of Altered mental status, unspecified, Anemia, iron deficiency, Anemia, unspecified, Cerebral artery occlusion with cerebral infarction (HCC), Dysphagia following cerebral infarction, Gastroesophageal reflux disease, Hemiplegia and hemiparesis following cerebral infarction affecting left non-dominant side (HCC), Hypertension, Intestinal malabsorption, unspecified, Major depressive disorder, recurrent, Major depressive disorder, recurrent, and Panic attacks.    Past Surgical History:   has a past surgical history that includes Foot surgery; laparotomy; Colonoscopy; Endoscopy, colon, diagnostic; Foot surgery (Left, 3/27/13); Upper gastrointestinal endoscopy (05/20/2014); Bladder surgery (Bilateral, 2/11/2022); Lithotripsy (Bilateral, 3/8/2022); Bladder surgery (Bilateral, 4/12/2022); and Wound debridement (Bilateral, 2/14/2025).     Social History:   reports that she has been smoking cigarettes. She has a 20 pack-year  identified of the gallbladder.  No definite stones.  Mild intrahepatic and extrahepatic biliary ductal dilatation. Correlation to right upper quadrant ultrasound can be performed if clinical symptoms exist.  Spleen is homogeneous in appearance. The pancreas is unremarkable.  Both adrenal glands are within normal limits. Abnormal appearance of the kidneys bilaterally.  There is chronic obstruction of the kidneys.  Air identified in the renal collecting system on the left with stranding identified surrounding the left kidney and left renal collecting system as well as the ureter.  There is enhancement of the urothelial lining spy laterally with stones seen throughout the renal collecting systems and ureters proximally with a stone identified in the bladder at the left UVJ measuring 3 mm in size.  There is a stone identified in the distal ureter on the right measuring approximately 8 mm.  Correlation to urinalysis is recommended.  Pyelonephritis is suggested bilaterally left greater than right. GI/Bowel: Stomach is decompressed.  Small bowel is within normal limits.  No significant wall thickening.  Increased stool burden seen within the colon. Large stool burden seen in the rectal vault to suggest fecal impaction.  Mild wall thickening identified of the rectum to suggest a mild proctitis/stercoral colitis. Pelvis: The bladder is decompressed with Feliz catheter balloon present. There is wall thickening.  Correlation to urinalysis suspecting UTI/cystitis. The uterus is unremarkable. Peritoneum/Retroperitoneum: No abdominal retroperitoneal lymphadenopathy.  No free fluid or free air.  No abnormal mass or fluid collections identified. Bones/Soft Tissues: Bony structures reveal multilevel degenerative changes seen within the spine.  There is no ventral abdominal wall mass or defect.     1. Abnormal appearance of the kidneys bilaterally with obstruction of the kidneys. There is new air identified in the renal collecting  system on the left with stranding identified surrounding the left kidney and left renal collecting system as well as the ureter. There is enhancement of the urothelial lining laterally with stones seen throughout the renal collecting systems and ureters proximally.  There is a stone identified in the distal ureter on the right measuring approximately 8 mm.  Suspect bilateral pyelonephritis/pyelitis left greater than right.  Correlation to urinalysis is recommended. 2. The bladder is decompressed with Feliz catheter balloon present. There is wall thickening. Correlation to urinalysis suspecting UTI/cystitis. 3. Increased stool burden seen within the colon with large stool burden seen in the rectal vault to suggest fecal impaction. Mild wall thickening identified of the rectum to suggest a mild proctitis/stercoral colitis. 4. Mild distension identified of the gallbladder with mild intrahepatic and extrahepatic biliary ductal dilatation. Correlation to right upper quadrant ultrasound can be performed if clinical symptoms exist. 5. Incompletely imaged small noncalcified nodule at the right lung base measuring 3 mm in size.  No routine follow-up is recommended according to Fleischner society guidelines.           IMPRESSION:      Anemia  Previous CVA with left hemiparesis.  Recurrent UTI  Post cystoscopy and urinary stents.  History of GI bleed  NSTEMI  Chronic hypertension  Obstructive uropathy    RECOMMENDATIONS:      Elevation of her troponin is significant and may be related to underlying coronary disease.  She is currently not having any chest pain or shortness of breath at rest.  She is not in CHF clinically.  2D echo Doppler to evaluate LV function  Okay to proceed with GI scope from cardiac standpoint  She will need further evaluation for coronary disease either in the form of pharmacological nuclear stress test or invasive evaluation with catheterization.  Guaiac stools  Continue to monitor hemoglobin  Continue

## 2025-04-22 NOTE — BRIEF OP NOTE
Brief Postoperative Note      Patient: Brigid Moses  YOB: 1970  MRN: [id number]    Date of Procedure: 4/22/2025    Pre-Op Diagnosis Codes:      * Bilateral kidney stones [N20.0]    Post-Op Diagnosis: Same       Procedure(s):  CYSTOSCOPY RETROGRADE PYELOGRAM BILATERAL STENT INSERTION    Surgeon(s):  López Mays MD    Assistant:      Anesthesia: Monitor Anesthesia Care    Estimated Blood Loss (mL): less than 50     Complications: None    Specimens:   ID Type Source Tests Collected by Time Destination   1 : URINE LEFT KIDNEY Urine Urine, Cystoscopic CULTURE, URINE López Mays MD 4/22/2025 1049        Implants:  Implant Name Type Inv. Item Serial No.  Lot No. LRB No. Used Action   STENT URET L26CM OD48FR PERCFLX DBL PGTL FLX TIP THRD W O - [id number]  STENT URET L26CM OD48FR PERCFLX DBL PGTL FLX TIP THRD W O  [employer] [id number] Right 1 Implanted   STENT URET L26CM OD48FR PERCFLX DBL PGTL FLX TIP THRD W O - [id number]  STENT URET L26CM OD48FR PERCFLX DBL PGTL FLX TIP THRD W O  [employer] [id number] Left 1 Implanted         Drains:   Urinary Catheter 04/22/25 Feliz (Active)       [REMOVED] Urinary Catheter 04/21/25 (Removed)   Catheter Indications Urinary retention (acute or chronic), continuous bladder irrigation or bladder outlet obstruction 04/21/25 2100   Site Assessment No urethral drainage 04/21/25 2100   Urine Color Yellow 04/21/25 2100   Urine Appearance Cloudy 04/21/25 2100   Collection Container Standard 04/21/25 2100   Securement Method Securing device (Describe) 04/21/25 2100   Catheter Care  Soap and water 04/21/25 2100   Catheter Best Practices  Drainage tube clipped to bed;Catheter secured to thigh;Tamper seal intact;Bag below bladder;Bag not on floor;Lack of dependent loop in tubing;Drainage bag less than half full 04/21/25 2100   Status Draining 04/21/25 2100   Output (mL) 1050 mL 04/22/25 0722       Findings:  Infection Present At Time Of  Surgery (PATOS) (choose all levels that have infection present):  No infection present  Other Findings:     Electronically signed by López Mays MD on 4/22/2025 at 11:02 AM

## 2025-04-22 NOTE — PROGRESS NOTES
Dr Mays consulted and asked to keep patient NPO for possible intervention of bilateral hydronephrosis with stones.

## 2025-04-22 NOTE — CARE COORDINATION
Case Management Assessment  Initial Evaluation    Date/Time of Evaluation: 4/22/2025 11:25 AM  Assessment Completed by: KAROLINA Milner    If patient is discharged prior to next notation, then this note serves as note for discharge by case management.    Patient Name: Brigid Moses                   YOB: 1970  Diagnosis: Acute upper GI hemorrhage [K92.2]                   Date / Time: 4/21/2025 10:49 AM    Patient Admission Status: Inpatient   Readmission Risk (Low < 19, Mod (19-27), High > 27): Readmission Risk Score: 21.8    Current PCP: Yessenia Perry MD  PCP verified by CM? Yes    Chart Reviewed: Yes      History Provided by: Medical Record, Other (see comment) ([facility] rep Goldie)  Patient Orientation: Unable to Assess (pt out of room/surgery)    Patient Cognition: Alert    Hospitalization in the last 30 days (Readmission):  No    If yes, Readmission Assessment in CM Navigator will be completed.    Advance Directives:      Code Status: Full Code   Patient's Primary Decision Maker is: Named in Scanned ACP Document    Primary Decision Maker: АндрейSergey - Aunt/Uncle - [phone]    Discharge Planning:    Patient lives with: Alone Type of Home: Skilled Nursing Facility  Primary Care Giver: Other (Comment) ([place])  Patient Support Systems include: Family Members, Friends/Neighbors   Current Financial resources:    Current community resources:    Current services prior to admission: Skilled Nursing Facility            Current DME:              Type of Home Care services:  None    ADLS  Prior functional level: Assistance with the following:, Bathing, Dressing, Toileting, Cooking, Housework, Shopping, Mobility  Current functional level: Mobility, Shopping, Housework, Cooking, Toileting, Dressing, Bathing, Assistance with the following:    PT AM-PAC:   /24  OT AM-PAC:   /24    Family can provide assistance at DC: No  Would you like Case Management to discuss the discharge plan with any  other family members/significant others, and if so, who? Yes (POA)  Plans to Return to Present Housing: Yes  Other Identified Issues/Barriers to RETURNING to current housing: none noted at this time   Potential Assistance needed at discharge: Skilled Nursing Facility            Potential DME:    Patient expects to discharge to: Skilled nursing facility  Plan for transportation at discharge:      Financial    Payor: MAKENZIE WILSON / Plan: [employer]ARE[employer]AN [employer] [place] DUAL / Product Type: *No Product type* /     Does insurance require precert for SNF: Yes    Potential assistance Purchasing Medications: No  Meds-to-Beds request: No      [facility] - [place], [place] - [address] [address] - P [phone] - F [phone] [address] [address]  [facility] [address]  Phone: [phone] Fax: [phone]      Notes:    Factors facilitating achievement of predicted outcomes: Family support    Barriers to discharge: none noted     Additional Case Management Notes: Pt is out of room to OR currently. Pt was admitted from [facility] at [facility], [place]-[address] [phone], Fax [phone], intermediate LOC. Sw spoke with rep Goldie. Pt is LTC bedhold, will accept back. PRECERT can be started if to have a skilled service and return with it pending. Per Goldie, her baseline is bed bound. IV Merrem +Blood cultures. Room air. Will confirm plan to return to SNF when pt returns from OR. ENRIQUE will need signed by physician.             KAROLINA Milner  Case Management Department  Ph:  Fax:

## 2025-04-22 NOTE — PROGRESS NOTES
4 Eyes Skin Assessment     NAME:  Brigid Moses  YOB: 1970  MEDICAL RECORD NUMBER:  [id number]    The patient is being assessed for  Admission    I agree that at least one RN has performed a thorough Head to Toe Skin Assessment on the patient. ALL assessment sites listed below have been assessed.      Areas assessed by both nurses:    Head, Face, Ears, Shoulders, Back, Chest, Arms, Elbows, Hands, Sacrum. Buttock, Coccyx, Ischium, and Legs. Feet and Heels  Bruising to right arm  Bone spurs on sacrum, foam dressing in place for prevention  Vascular discoloration BLE  Blanchable redness on buttocks        Does the Patient have a Wound? No noted wound(s)       John Prevention initiated by RN: Yes  Wound Care Orders initiated by RN: No    Pressure Injury (Stage 3,4, Unstageable, DTI, NWPT, and Complex wounds) if present, place Wound referral order by RN under : No    New Ostomies, if present place, Ostomy referral order under : No     Nurse 1 eSignature: Electronically signed by Caroline Snyder RN on 4/22/25 at 6:24 AM EDT    **SHARE this note so that the co-signing nurse can place an eSignature**    Nurse 2 eSignature: Electronically signed by Maliha Burkett RN on 4/22/25 at 6:29 AM EDT

## 2025-04-22 NOTE — CONSULTS
4/22/2025 8:20 AM  Brigid Guardadoas  [id number]     Chief Complaint:    Bilateral ureteral calculi, Bilateral staghorn calculi, UTI with bacteremia      History of Present Illness:      The patient is a 55 y.o. female patient who presented to the hospital from a nursing facility for possible GI bleed.  She does CT abdomen pelvis performed that showed multiple bilateral ureteral calculi, bilateral staghorn calculi, emphysematous pyelitis, and bilateral hydronephrosis.      She is known to our practice and has a history of recurrent nephrolithiasis.  She has required multiple stone procedures in the past.  She was seen in February for bilateral staghorn calculi and left ureteral calculi.  She was taken to the OR for attempted left stent placement but this was difficult and unable to be placed.  She was to have bilateral nephrostomy tubes placed but IR was unable to place these due to absence of hydronephrosis.  She was referred to [facility], Dr. Sierra for consideration of bilateral PCNL which she has not seen yet.    Past Medical History:   Diagnosis Date    Altered mental status, unspecified     Anemia, iron deficiency     Anemia, unspecified     Cerebral artery occlusion with cerebral infarction (HCC)     Dysphagia following cerebral infarction     Gastroesophageal reflux disease     without esophagitis    Hemiplegia and hemiparesis following cerebral infarction affecting left non-dominant side (HCC)     Hypertension     Intestinal malabsorption, unspecified     Major depressive disorder, recurrent     Major depressive disorder, recurrent 9/14/2022    Panic attacks          Past Surgical History:   Procedure Laterality Date    BLADDER SURGERY Bilateral 2/11/2022    CYSTOSCOPY RETROGRADE PYELOGRAM BILATERAL  STENT INSERTION performed by Benedicto Eugene DO at [facility] OR    BLADDER SURGERY Bilateral 4/12/2022    CYSTOSCOPY RETROGRADE PYELOGRAM,  URETEROSCOPY,  LASER LITHOTRIPSY - BILATERAL, WITH BILATERAL STENT EXCHANGE performed  mouth daily (Patient not taking: Reported on 4/21/2025)  metoclopramide (REGLAN) 10 MG tablet, Take 1 tablet by mouth 4 times daily (Patient not taking: Reported on 4/21/2025)    Allergies:    Bactrim [sulfamethoxazole-trimethoprim]    Social History:    reports that she has been smoking cigarettes. She has a 20 pack-year smoking history. She has never used smokeless tobacco. She reports that she does not drink alcohol and does not use drugs.    Family History:   Non-contributory to this Urological problem  family history is not on file.    Review of Systems:  Constitutional: No fever or chills, weak  Respiratory: negative for cough and hemoptysis  Cardiovascular: negative for chest pain and dyspnea  Gastrointestinal: negative for abdominal pain, diarrhea, nausea and vomiting   Derm: negative for rash and skin lesion(s)  Neurological: History of stroke  Musculoskeletal: Negative    Psychiatric: Negative   : As above in the HPI, otherwise negative  All other reviews are negative    Physical Exam:     Vitals:  BP 96/65   Pulse (!) 108   Temp 98.2 °F (36.8 °C) (Oral)   Resp 22   Ht 1.575 m (5' 2\")   Wt 45.4 kg (100 lb)   LMP  (LMP Unknown)   SpO2 96%   BMI 18.29 kg/m²     General: Awake and alert, no acute distress  HEENT:  Normocephalic, atraumatic.  Lungs:  Respirations symmetric and non-labored.  Abdomen:  soft, nontender, no masses  Extremities:  No clubbing, cyanosis, or edema  Skin:  Warm and dry, no open lesions or rashes  Neuro: Left hemiparesis  Rectal: deferred  Genitourinary: Feliz with cloudy yellow urine    Labs:     Recent Labs     04/21/25  1125 04/21/25 2053 04/22/25  0426   WBC 11.0  --   --    RBC 2.78*  --   --    HGB 7.9* 9.3* 8.9*   HCT 24.3* 29.3* 27.1*   MCV 87.4  --   --    MCH 28.4  --   --    MCHC 32.5  --   --    RDW 16.8*  --   --      --   --    MPV 9.5  --   --          Recent Labs     04/21/25  1125   CREATININE 1.2*       No results found for: \"PSA\"    Imaging:

## 2025-04-22 NOTE — ACP (ADVANCE CARE PLANNING)
Advance Care Planning   Healthcare Decision Maker:    Primary Decision Maker: Sergey Chiang - Aunt/Uncle - [phone]    Click here to complete Healthcare Decision Makers including selection of the Healthcare Decision Maker Relationship (ie \"Primary\").  Today we documented Decision Maker(s) consistent with ACP documents on file.

## 2025-04-22 NOTE — CONSULTS
CONSULTATION NOTE :ID     Patient - Brigid Moses,  Age - 55 y.o.    - 1970      Room Number - 0634/0634-01   MRN -  [id number]   [facility] # - [id number]  Date of Admission -  2025 10:49 AM  Patient's PCP: Yessenia Perry MD     Requesting Physician: Wili Walker DO    HISTORY OF PRESENT ILLNESS   This is a 55 y.o. female who was admitted on 2025   to the hospital with a chief complaints of   Chief Complaint   Patient presents with    GI Bleeding     Sent from nursing home for possible GI bleed, patient states no complaints     ID was consulted on 25 for antibiotic management   Knonw to ID   Last seen on   Leukocytosis better   Complicated uti Psedomonas/ESBL E coli  Extensive bilateral staghorn like calculi. With staghorn pt should be on suppressive atb unfortunately with current cx no oral  suppressive available   CYSTOSCOPY left retrograde pyelogram, attempted stent placement   Urology recommendation for bilateral nephrostomy tube placement for eventual PCNL   Cont meropenem for 14 days     Current antibiotics    [START ON 2025] meropenem (MERREM) 1,000 mg in sodium chloride 0.9 % 100 mL IVPB (addEASE), Q12H    Pt came in with poss GIB   Afebrile vs stable   Cr1.2->1.1   Alt 78 ast94  wbc 11 hgb 8.9   Ua wbc bacteria le   Blood cx E coli      Bilateral ureteral and renal calculi with urinary tract infection and sepsis.     OPERATIONS:  Cystopanendoscopy, retrograde pyelogram, bilateral stent placement.    Pt is awake and alert feels better has no c/o f/c/n/v/dabd pain   PAST MEDICAL AND SURGICAL HISTORY     Past Medical History:   Diagnosis Date    Altered mental status, unspecified     Anemia, iron deficiency     Anemia, unspecified     Cerebral artery occlusion with cerebral infarction (HCC)     Dysphagia following cerebral infarction     Gastroesophageal reflux disease     without esophagitis    Hemiplegia and hemiparesis following cerebral  infarction affecting left non-dominant side (HCC)     Hypertension     Intestinal malabsorption, unspecified     Major depressive disorder, recurrent     Major depressive disorder, recurrent 9/14/2022    Panic attacks        Past Surgical History:   Procedure Laterality Date    BLADDER SURGERY Bilateral 2/11/2022    CYSTOSCOPY RETROGRADE PYELOGRAM BILATERAL  STENT INSERTION performed by Benedicto Eugene DO at [facility] OR    BLADDER SURGERY Bilateral 4/12/2022    CYSTOSCOPY RETROGRADE PYELOGRAM,  URETEROSCOPY,  LASER LITHOTRIPSY - BILATERAL, WITH BILATERAL STENT EXCHANGE performed by Prinec Mays MD at [facility] OR    COLONOSCOPY      DEBRIDEMENT Bilateral 2/14/2025    CYSTOSCOPY QUIÑONES INSERTION performed by Leon Mota MD at [facility] OR    ENDOSCOPY, COLON, DIAGNOSTIC      FOOT SURGERY      right toes    FOOT SURGERY Left 3/27/13    correction 4th, 5th toes left foot    LAPAROTOMY      LITHOTRIPSY Bilateral 3/8/2022    CYSTOSCOPY RETROGRADE PYELOGRAM URETEROSCOPY, LASER LITHOTRIPSY performed by Prince Mays MD at [facility] OR    UPPER GASTROINTESTINAL ENDOSCOPY  05/20/2014       MEDICATIONS PRIOR TO ADMISSION:     Scheduled Meds:   magnesium sulfate  4,000 mg IntraVENous Once    sodium chloride flush  5-40 mL IntraVENous 2 times per day    [START ON 4/23/2025] meropenem  1,000 mg IntraVENous Q12H    pantoprazole (PROTONIX) 40 mg in sodium chloride (PF) 0.9 % 10 mL injection  40 mg IntraVENous Q12H    polyethylene glycol  17 g Oral BID    amitriptyline  25 mg Oral Nightly    [Held by provider] aspirin  81 mg Oral Daily    atorvastatin  40 mg Oral Daily    Vitamin D  2,000 Units Oral Daily    ferrous sulfate  325 mg Oral Daily with breakfast    hydrOXYzine HCl  10 mg Oral TID    [Held by provider] lisinopril  2.5 mg Oral Daily    megestrol  20 mg Oral Daily    magnesium oxide  400 mg Oral Daily    mirtazapine  15 mg Oral Daily    zinc sulfate  50 mg Oral Daily    sodium chloride flush  5-40 mL IntraVENous 2 times per day     magnesium sulfate  2,000 mg IntraVENous Once     Continuous Infusions:   sodium chloride      sodium chloride      sodium bicarbonate 150 mEq in dextrose 5 % 1,000 mL infusion 100 mL/hr at 04/21/25 2025    sodium chloride       PRN Meds:naloxone 0.4 mg in 10 mL sodium chloride syringe, sodium chloride flush, sodium chloride, prochlorperazine, midazolam, diphenhydrAMINE, sodium chloride, sodium chloride flush, sodium chloride, ondansetron **OR** ondansetron, polyethylene glycol, acetaminophen **OR** acetaminophen    ALLERGIES:    Bactrim [sulfamethoxazole-trimethoprim]  SOCIAL HISTORY:   TOBACCO:   reports that she has been smoking cigarettes. She has a 20 pack-year smoking history. She has never used smokeless tobacco.     ETOH:   reports no history of alcohol use.       FAMILY HISTORY:   History reviewed. No pertinent family history.    REVIEW OF SYSTEMS:     Constitutional: no fever, no night sweats, no fatigue.  Head: no head ache , no head injury, no migranes.  Eye: no blurring of vision, no double vision.  Ears: no hearing difficulty, no tinnitus  Mouth/throat: no ulceration, dental caries , dysphagia  Lungs: no cough no chest pain  CVS: no palpitation, no chest pain, no shortness of breath  GI: no abdominal pain, no nausea , no vomiting, no constipation  JASON: no dysuria, frequency and urgency, no hematuria, no kidney stones  Musculoskeletal: no joint pain, swelling , stiffness,  Endocrine: no polyuria, polydipsia, no cold or heat intolerance  Hematology: no anemia, no easy brusing or bleeding, no hx of clotting disorder  Dermatology: no skin rash, no eczema, no pruritis,  Psychiatry: no depression, no anxiety,no panic attacks, no suicide ideation    PHYSICAL EXAM:     /83   Pulse (!) 102   Temp 97.5 °F (36.4 °C) (Oral)   Resp 22   Ht 1.575 m (5' 2\")   Wt 45.4 kg (100 lb)   LMP  (LMP Unknown)   SpO2 97%   BMI 18.29 kg/m²   General:    Comfortable  on her side  HEENT:   AT/NC     Cardiovascular:

## 2025-04-23 PROBLEM — E44.0 MODERATE PROTEIN-CALORIE MALNUTRITION: Chronic | Status: ACTIVE | Noted: 2025-04-23

## 2025-04-23 LAB
ANION GAP SERPL CALCULATED.3IONS-SCNC: 9 MMOL/L (ref 7–16)
BUN SERPL-MCNC: 12 MG/DL (ref 6–20)
CALCIUM SERPL-MCNC: 7.9 MG/DL (ref 8.6–10.2)
CHLORIDE SERPL-SCNC: 104 MMOL/L (ref 98–107)
CO2 SERPL-SCNC: 26 MMOL/L (ref 22–29)
CREAT SERPL-MCNC: 1.1 MG/DL (ref 0.5–1)
GFR, ESTIMATED: 60 ML/MIN/1.73M2
GLUCOSE SERPL-MCNC: 106 MG/DL (ref 74–99)
HCT VFR BLD AUTO: 26.2 % (ref 34–48)
HCT VFR BLD AUTO: 26.7 % (ref 34–48)
HCT VFR BLD AUTO: NORMAL % (ref 36.3–47.1)
HGB BLD-MCNC: 8.4 G/DL (ref 11.5–15.5)
HGB BLD-MCNC: 8.8 G/DL (ref 11.5–15.5)
HGB BLD-MCNC: NORMAL G/DL (ref 11.9–15.1)
MAGNESIUM SERPL-MCNC: 2.9 MG/DL (ref 1.6–2.6)
MICROORGANISM SPEC CULT: ABNORMAL
POTASSIUM SERPL-SCNC: 3 MMOL/L (ref 3.5–5)
SERVICE CMNT-IMP: ABNORMAL
SODIUM SERPL-SCNC: 139 MMOL/L (ref 132–146)
SPECIMEN DESCRIPTION: ABNORMAL

## 2025-04-23 PROCEDURE — 2580000003 HC RX 258: Performed by: INTERNAL MEDICINE

## 2025-04-23 PROCEDURE — 6370000000 HC RX 637 (ALT 250 FOR IP): Performed by: INTERNAL MEDICINE

## 2025-04-23 PROCEDURE — 6360000002 HC RX W HCPCS: Performed by: INTERNAL MEDICINE

## 2025-04-23 PROCEDURE — 6360000002 HC RX W HCPCS: Performed by: HOSPITALIST

## 2025-04-23 PROCEDURE — 36410 VNPNXR 3YR/> PHY/QHP DX/THER: CPT

## 2025-04-23 PROCEDURE — 85014 HEMATOCRIT: CPT

## 2025-04-23 PROCEDURE — 2580000003 HC RX 258: Performed by: HOSPITALIST

## 2025-04-23 PROCEDURE — 2060000000 HC ICU INTERMEDIATE R&B

## 2025-04-23 PROCEDURE — 6370000000 HC RX 637 (ALT 250 FOR IP): Performed by: HOSPITALIST

## 2025-04-23 PROCEDURE — 2500000003 HC RX 250 WO HCPCS: Performed by: SPECIALIST

## 2025-04-23 PROCEDURE — 76937 US GUIDE VASCULAR ACCESS: CPT

## 2025-04-23 PROCEDURE — 36415 COLL VENOUS BLD VENIPUNCTURE: CPT

## 2025-04-23 PROCEDURE — 6370000000 HC RX 637 (ALT 250 FOR IP): Performed by: NURSE PRACTITIONER

## 2025-04-23 PROCEDURE — 80048 BASIC METABOLIC PNL TOTAL CA: CPT

## 2025-04-23 PROCEDURE — APPSS30 APP SPLIT SHARED TIME 16-30 MINUTES: Performed by: NURSE PRACTITIONER

## 2025-04-23 PROCEDURE — 85018 HEMOGLOBIN: CPT

## 2025-04-23 PROCEDURE — 83735 ASSAY OF MAGNESIUM: CPT

## 2025-04-23 PROCEDURE — 05HD33Z INSERTION OF INFUSION DEVICE INTO RIGHT CEPHALIC VEIN, PERCUTANEOUS APPROACH: ICD-10-PCS | Performed by: INTERNAL MEDICINE

## 2025-04-23 PROCEDURE — C1751 CATH, INF, PER/CENT/MIDLINE: HCPCS

## 2025-04-23 PROCEDURE — 99233 SBSQ HOSP IP/OBS HIGH 50: CPT | Performed by: INTERNAL MEDICINE

## 2025-04-23 RX ORDER — POLYETHYLENE GLYCOL 3350 17 G/17G
238 POWDER, FOR SOLUTION ORAL ONCE
Status: COMPLETED | OUTPATIENT
Start: 2025-04-23 | End: 2025-04-23

## 2025-04-23 RX ORDER — METOPROLOL SUCCINATE 25 MG/1
12.5 TABLET, EXTENDED RELEASE ORAL DAILY
Status: DISCONTINUED | OUTPATIENT
Start: 2025-04-24 | End: 2025-04-25 | Stop reason: HOSPADM

## 2025-04-23 RX ORDER — POTASSIUM CHLORIDE 1500 MG/1
40 TABLET, EXTENDED RELEASE ORAL 2 TIMES DAILY
Status: COMPLETED | OUTPATIENT
Start: 2025-04-23 | End: 2025-04-23

## 2025-04-23 RX ORDER — METOPROLOL SUCCINATE 25 MG/1
25 TABLET, EXTENDED RELEASE ORAL DAILY
Status: DISCONTINUED | OUTPATIENT
Start: 2025-04-23 | End: 2025-04-23

## 2025-04-23 RX ORDER — BISACODYL 5 MG/1
15 TABLET, DELAYED RELEASE ORAL ONCE
Status: COMPLETED | OUTPATIENT
Start: 2025-04-23 | End: 2025-04-23

## 2025-04-23 RX ORDER — SODIUM CHLORIDE, SODIUM LACTATE, POTASSIUM CHLORIDE, CALCIUM CHLORIDE 600; 310; 30; 20 MG/100ML; MG/100ML; MG/100ML; MG/100ML
INJECTION, SOLUTION INTRAVENOUS CONTINUOUS
Status: DISCONTINUED | OUTPATIENT
Start: 2025-04-23 | End: 2025-04-24

## 2025-04-23 RX ADMIN — Medication 50 MG: at 09:28

## 2025-04-23 RX ADMIN — POTASSIUM CHLORIDE 40 MEQ: 1500 TABLET, EXTENDED RELEASE ORAL at 12:08

## 2025-04-23 RX ADMIN — PANTOPRAZOLE SODIUM 40 MG: 40 INJECTION, POWDER, FOR SOLUTION INTRAVENOUS at 03:38

## 2025-04-23 RX ADMIN — CHOLECALCIFEROL TAB 25 MCG (1000 UNIT) 2000 UNITS: 25 TAB at 09:28

## 2025-04-23 RX ADMIN — PANTOPRAZOLE SODIUM 40 MG: 40 INJECTION, POWDER, FOR SOLUTION INTRAVENOUS at 14:59

## 2025-04-23 RX ADMIN — MIRTAZAPINE 15 MG: 15 TABLET, FILM COATED ORAL at 21:53

## 2025-04-23 RX ADMIN — METOPROLOL SUCCINATE 25 MG: 25 TABLET, EXTENDED RELEASE ORAL at 09:28

## 2025-04-23 RX ADMIN — POTASSIUM CHLORIDE 40 MEQ: 1500 TABLET, EXTENDED RELEASE ORAL at 21:53

## 2025-04-23 RX ADMIN — POLYETHYLENE GLYCOL 3350 17 G: 17 POWDER, FOR SOLUTION ORAL at 21:54

## 2025-04-23 RX ADMIN — SODIUM CHLORIDE, SODIUM LACTATE, POTASSIUM CHLORIDE, AND CALCIUM CHLORIDE: .6; .31; .03; .02 INJECTION, SOLUTION INTRAVENOUS at 09:26

## 2025-04-23 RX ADMIN — POLYETHYLENE GLYCOL 3350 238 G: 17 POWDER, FOR SOLUTION ORAL at 17:24

## 2025-04-23 RX ADMIN — SODIUM CHLORIDE, PRESERVATIVE FREE 10 ML: 5 INJECTION INTRAVENOUS at 21:54

## 2025-04-23 RX ADMIN — MAGNESIUM OXIDE 400 MG: 400 TABLET ORAL at 09:28

## 2025-04-23 RX ADMIN — MEGESTROL ACETATE 20 MG: 40 TABLET ORAL at 09:28

## 2025-04-23 RX ADMIN — HYDROXYZINE HYDROCHLORIDE 10 MG: 10 TABLET, FILM COATED ORAL at 21:53

## 2025-04-23 RX ADMIN — AMITRIPTYLINE HYDROCHLORIDE 25 MG: 25 TABLET, FILM COATED ORAL at 21:53

## 2025-04-23 RX ADMIN — HYDROXYZINE HYDROCHLORIDE 10 MG: 10 TABLET, FILM COATED ORAL at 09:28

## 2025-04-23 RX ADMIN — ATORVASTATIN CALCIUM 40 MG: 40 TABLET, FILM COATED ORAL at 09:28

## 2025-04-23 RX ADMIN — MEROPENEM 1000 MG: 1 INJECTION INTRAVENOUS at 12:09

## 2025-04-23 RX ADMIN — BISACODYL 15 MG: 5 TABLET, COATED ORAL at 09:28

## 2025-04-23 RX ADMIN — POLYETHYLENE GLYCOL 3350 17 G: 17 POWDER, FOR SOLUTION ORAL at 09:30

## 2025-04-23 RX ADMIN — FERROUS SULFATE TAB 325 MG (65 MG ELEMENTAL FE) 325 MG: 325 (65 FE) TAB at 09:28

## 2025-04-23 NOTE — PROGRESS NOTES
PROGRESS NOTE  By Vincenzo Khan M.D.    [facility]  Dr. Herbert Blanco M.D.,  Dr. Pal Burrell M.D.,   Dr. Leon Pearson DLevO.,  Dr. Pedro Chavarria M.D.,  Dr. Mekhi Oro D.O.,          Brigid Guardadoas  55 y.o.  female    SUBJECTIVE:  No new complaint    OBJECTIVE:    BP (!) 86/65   Pulse 98   Temp 98.6 °F (37 °C)   Resp 22   Ht 1.575 m (5' 2\")   Wt 45.4 kg (100 lb)   LMP  (LMP Unknown)   SpO2 97%   BMI 18.29 kg/m²     General: NAD/adult  female  HEENT: Moist oral mucosa/poor dentition  Neck: Trachea midline/supple  Chest: Symmetric excursion/nonlabored respirations/CTAB  Cor: Regular  Abd.: Soft.  Nontender and nondistended  Extr.:  BLE edema  Skin: Warm and dry/anicteric    DATA:    Monitor data reviewed -sinus rhythm noted.       Lab Results   Component Value Date/Time    WBC 11.0 04/21/2025 11:25 AM    RBC 2.78 04/21/2025 11:25 AM    HGB 10.0 04/22/2025 05:53 PM    HCT 30.9 04/22/2025 05:53 PM    MCV 87.4 04/21/2025 11:25 AM    MCH 28.4 04/21/2025 11:25 AM    MCHC 32.5 04/21/2025 11:25 AM    RDW 16.8 04/21/2025 11:25 AM     04/21/2025 11:25 AM    MPV 9.5 04/21/2025 11:25 AM     Lab Results   Component Value Date/Time     04/22/2025 05:53 PM    K 3.8 04/22/2025 05:53 PM     04/22/2025 05:53 PM    CO2 20 04/22/2025 05:53 PM    BUN 15 04/22/2025 05:53 PM    CREATININE 1.1 04/22/2025 05:53 PM    CALCIUM 8.5 04/22/2025 05:53 PM    BILITOT 0.2 04/21/2025 11:25 AM    ALKPHOS 144 04/21/2025 11:25 AM    AST 94 04/21/2025 11:25 AM    ALT 78 04/21/2025 11:25 AM     Lab Results   Component Value Date/Time    LIPASE 17 04/21/2025 11:25 AM     Lab Results   Component Value Date/Time    AMYLASE 109 03/16/2020 01:49 PM         ASSESSMENT/PLAN:  Patient Active Problem List   Diagnosis    Hammer toe, acquired    Altered mental status    Anemia, unspecified    Dysphagia following cerebral infarction    Hemiplegia and hemiparesis following cerebral infarction affecting left

## 2025-04-23 NOTE — PROGRESS NOTES
[facility] Hospitalist   Progress Note    Admitting Date and Time: 4/21/2025 10:49 AM  Admit Dx: Acute upper GI hemorrhage [K92.2]      Subjective:    4/22: Pt awake, A&O, lying in bed in no apparent distress. RN reports that potassium replacement was not administered yesterday. BC's + for GNR x2 bottles, will start meropenem & consult ID. Pt denies CP, SOB, n/v/d/, fever or chills.  Stable overnight. No other overnight issues reported.     4/23: Pt awake, A&O, lying in bed in no apparent distress.  She went for stat cystoscopy yesterday with successful placement of bilat stents. States she feels good today. Continues to deny CP. Denies SOB. Discussed septicemia and ongoing atbx after discharge. Midline ordered, not yet placed. Also discussed cardiologist's plan for ischemic work-up once acute issues resolved. GI planning on EGD and colonoscopy tomorrow. Today, she is on CLD and starting bowel prep. No needs at this time.    ROS: Denies fever, chills, cp, sob, n/v, HA unless stated above.     Objective:    BP (!) 86/65   Pulse 98   Temp 98.6 °F (37 °C)   Resp 22   Ht 1.575 m (5' 2\")   Wt 45.4 kg (100 lb)   LMP  (LMP Unknown)   SpO2 97%   BMI 18.29 kg/m²     PHYSICAL EXAM  Constitutional: No fever, chills, diaphoresis. Non-toxic appearing  Skin: No visible skin rash or erythema, no visible skin breakdown  HEENT: Unremarkable; mucous membranes are moist  Neck: No JVD, lymphadenopathy, thyromegaly  Cardiovascular: Normal rate, normal rhythm to auscultation. S1, S2 normal.  No S3 or rubs appreciated. No carotid bruits appreciated.  Respiratory: Clear to auscultation bilaterally. No respiratory distress. Currently on room air.  Gastrointestinal: Soft, non-tender, non-distended. Active bowel sounds  Genitourinary: No CVA tenderness  Extremities: No clubbing or cyanosis. No edema  Neurological: Awake, alert, oriented x 3. LUE, LLE flaccid, left hand contracted  Psychological: Appropriate affect.

## 2025-04-23 NOTE — DISCHARGE INSTRUCTIONS
A ureteral stent was inserted during your recent procedure.  Unlike a heart \"stent\" which is metal, short, and permanent, this ureteral stent plastic, and temporary.  It spans from your kidney, down the ureter, and into your bladder.  This will need to be removed, so it is very important that you follow-up with your doctor.    IMPORTANT - This ureteral stent will likely cause you to experience frequent urination, urgency to urinate, back/flank pain with urination, and/or blood in the urine.  These things are very normal.  Taking the pain medications and/or anti-inflammatories will help to manage this discomfort if present.  If you have any questions or concerns you can contact [facility]rology office at [phone].     Ureteral Stent Placement: What to Expect at Home  Your Recovery     A ureteral (say \"you-REE-ter-ul\") stent is a thin, hollow tube that is placed in the ureter to help urine pass from the kidney into the bladder. Ureters are the tubes that connect the kidneys to the bladder.  You may have a small amount of blood in your urine for 1 to 3 days after the procedure.  While the stent is in place, you may have to urinate more often, feel a sudden need to urinate, or feel like you can't completely empty your bladder. You may feel some pain when you urinate or do strenuous activity. You also may notice a small amount of blood in your urine after strenuous activities. These side effects usually don't prevent people from doing their normal daily activities.  You may have a thin string coming out of your urethra. Your urethra is the tube that carries urine from your bladder to outside your body. This string is attached to the stent. Try not to pull on the string. It will be used to pull out the stent when you no longer need it.  After the procedure, urine may flow better from your kidneys to your bladder. A ureteral stent may be left in place for several days or for as long as several months. Your doctor will

## 2025-04-23 NOTE — PROGRESS NOTES
Date:  4/23/2025  Patient: Brigid Moses  Admission:  4/21/2025 10:49 AM  Admit DX: Acute upper GI hemorrhage [K92.2]  Age:  55 y.o., 1970     LOS: 2 days       SUBJECTIVE:    Denies chest pain or shortness of breath.  Blood pressure is controlled  Blood cultures were positive for E. coli  Urine cultures are positive for E. coli      OBJECTIVE:    /71   Pulse (!) 107   Temp 97.8 °F (36.6 °C) (Oral)   Resp 18   Ht 1.575 m (5' 2\")   Wt 45.4 kg (100 lb)   LMP  (LMP Unknown)   SpO2 99%   BMI 18.29 kg/m²     Intake/Output Summary (Last 24 hours) at 4/23/2025 1111  Last data filed at 4/23/2025 0842  Gross per 24 hour   Intake 120 ml   Output 1675 ml   Net -1555 ml       EXAM:        General: Alert and oriented, No acute distress.  Appears as stated age.  Eye:  Pupils are equal, round and reactive to light, Extraocular movements are intact, Normal conjunctiva.    Head: Normocephalic, Normal hearing, Oral mucosa is moist.  Neck: Supple, No carotid bruit, No jugular venous distention, No lymphadenopathy.  Respiratory: Lungs are clear to auscultation, Respirations are non-labored, Breath sounds are equal, Symmetrical chest wall expansion.  Cardiovascular: Normal rate, No murmur, No gallop, Good pulses equal in all extremities, No edema.  Gastrointestinal: Soft, Non-tender, Non-distended, Normal bowel sounds.  Musculoskeletal: Normal strength, No tenderness, No deformity.  Integumentary:  Warm, Dry.    Neurologic: Alert, Oriented, left-sided weakness.  Cognition and Speech: Oriented, Speech clear and coherent.  Psychiatric: Cooperative, Appropriate mood & affect, Normal judgment..    Current Inpatient Medications:   metoprolol succinate  25 mg Oral Daily    polyethylene glycol  238 g Oral Once    magnesium citrate  296 mL Oral Once    potassium chloride  40 mEq Oral BID    sodium chloride flush  5-40 mL IntraVENous 2 times per day    meropenem  1,000 mg IntraVENous Q12H    sodium chloride flush  5-40 mL

## 2025-04-23 NOTE — PROGRESS NOTES
Comprehensive Nutrition Assessment    Type and Reason for Visit:  Initial, Positive nutrition screen (MST 2)    Nutrition Recommendations/Plan:   Continue Current Diet (Clear Liquid)   Begin ONS (Clear Liquid) TID  Continue to monitor nutrition progression and provide recommendations as indicated/medically appropriate  Consult RD as needed      Malnutrition Assessment:  Malnutrition Status:  Moderate malnutrition (04/23/25 1328)    Context:  Chronic Illness     Findings of the 6 clinical characteristics of malnutrition:  Energy Intake:  75% or less estimated energy requirements for 1 month or longer  Weight Loss:  Unable to assess     Body Fat Loss:   (moderate) Orbital, Triceps, Buccal region   Muscle Mass Loss:   (moderate) Temples (temporalis), Clavicles (pectoralis & deltoids)  Fluid Accumulation:  No fluid accumulation     Strength:  Not Performed    Nutrition Assessment:    Pt admit w/ GI bleed w/ Melena, Blood Loss Anemia, Dehydration, AMBROCIO, Uropathy, UTI, Bilateral Hydronephrosis. Pt s/p Cystoscopy w/ bilateral stent placement 04/22/25. Pt for EGD/Colonoscopy tomorrow. PMHx: CVA w/ L-hemiplegia, HTN, GERD. pt on clear liquid diet. Will continue to monitor nutrition progression and provide recommendations as indicated/medically appropriate, f/up per policy.    Nutrition Related Findings:    A/O x4, I/O -1900 since admit, abd wdl, +BS x4, Contracture, K+ 3.0, Creatinine 1.1, Troponin 284, Hgb 8.8 Wound Type: None       Current Nutrition Intake & Therapies:    Average Meal Intake: 0%  Average Supplements Intake: None Ordered  ADULT DIET; Clear Liquid  Diet NPO Exceptions are: Sips of Water with Meds  ADULT ORAL NUTRITION SUPPLEMENT; Lunch, Dinner, Breakfast; Clear Liquid Oral Supplement    Anthropometric Measures:  Height: 157.5 cm (5' 2.01\")  Ideal Body Weight (IBW): 110 lbs (50 kg)    Admission Body Weight: 45.4 kg (100 lb 1.4 oz) (04/21/25 stated)  Current Body Weight: 45.4 kg (100 lb 1.4 oz), 91 % IBW.  Weight Source: Stated (04/21/25)  Current BMI (kg/m2): 18.3  Usual Body Weight:  (DANILO d/t lack of recent wt hx, noted 122lb in 4/2022 and 167lb in 11/2018 indicates continued wt loss)        Weight Adjustment For: No Adjustment        BMI Categories: Underweight (BMI less than 18.5)    Estimated Daily Nutrient Needs:  Energy Requirements Based On: Kcal/kg  Weight Used for Energy Requirements: Current  Energy (kcal/day): 7140-4180 kcals/day (CBW 30-35 kcals/kg Underweight BMI)  Weight Used for Protein Requirements: Current  Protein (g/day): 70-80 g/day (CBW 1.5-1.7 g/kg Underweight BMI)  Method Used for Fluid Requirements: 1 ml/kcal  Fluid (ml/day): 7141-0260 mls/day or per care team    Nutrition Diagnosis:   Moderate malnutrition, in context of chronic illness related to cognitive or neurological impairment (L-side Hemiplegia, hemiparesis 2/2 CVA) as evidenced by intake 0-25%, NPO or clear liquid status due to medical condition    Nutrition Interventions:   Food and/or Nutrient Delivery: Continue Current Diet, Start Oral Nutrition Supplement  Nutrition Education/Counseling: Education/Counseling not indicated  Coordination of Nutrition Care: Continue to monitor while inpatient       Goals:  Goals: Meet at least 75% of estimated needs, by next RD assessment  Type of Goal: New goal       Nutrition Monitoring and Evaluation:   Behavioral-Environmental Outcomes: None Identified  Food/Nutrient Intake Outcomes: Supplement Intake, Food and Nutrient Intake  Physical Signs/Symptoms Outcomes: Biochemical Data, GI Status, Fluid Status or Edema, Weight, Skin, Nutrition Focused Physical Findings    Discharge Planning:    Too soon to determine     Rina Arthur RD, LD  Contact: [phone]

## 2025-04-23 NOTE — CARE COORDINATION
SOCIAL WORK / DISCHARGE PLANNING:  Mic confirmed with pt, dc plan to return to [facility] at [place], [address] [phone], Fax [phone]. Pt ordered IV Invanz q 24hr x 14 days, midline placed. Mic updated Goldie [facility] rep, will start PRECERT but pt can dc prior to it being obtained. Pt to have EGD/ C scope tomorrow. ENRIQUE will need signed by physician.            Electronically signed by KAROLINA Milner on 4/23/2025 at 10:51 AM

## 2025-04-23 NOTE — PROGRESS NOTES
Physician Progress Note      PATIENT:               ZONIA ALLEN  [facility] #:                  [id number]  :                       1970  ADMIT DATE:       2025 10:49 AM  DISCH DATE:  RESPONDING  PROVIDER #:        Wili Walker DO          QUERY TEXT:    Sepsis is documented in the medical record in the  and  IM progress   notes. Please provide additional clinical indicators supportive of your   documentation. Or please document if the diagnosis of sepsis has been ruled   out after study.    The clinical indicators include:  - 55 y.o. female patient with chronic pollack for chronic obstructive uropathy   presented with concern for upper GIB. (H&P, )  - Sepsis present on admission due to E. coli bacteremia, likely urinary tract   source. IV fluids. Meropenem. (IM PN, )  - WBC 11.0 ()  - T max 99.3 ()  - IV fluid bolus, Merrem (MAR)  Options provided:  -- Sepsis present as evidenced by, Please document evidence.  -- Sepsis was ruled out after study. Bacteremia without sepsis confirmed.  -- Other - I will add my own diagnosis  -- Disagree - Not applicable / Not valid  -- Disagree - Clinically unable to determine / Unknown  -- Refer to Clinical Documentation Reviewer    PROVIDER RESPONSE TEXT:    Sepsis is present as evidenced by tachycardia with heart rate 134, hypotension   with BP 88/63, and acute kidney injury with creatinine twice baseline (0.6 to   1.2)    Query created by: Soniya Lemus on 2025 3:40 PM      Electronically signed by:  Wili Walker DO 2025 3:46 PM

## 2025-04-23 NOTE — PROGRESS NOTES
Patient aware of orders for glycolax and mag citrate for colonoscopy prep. States she will drink them \"when she's ready.\" Education provided about need to complete prep prior to colonoscopy, expressed understanding.

## 2025-04-23 NOTE — PROGRESS NOTES
4/23/2025 8:43 AM  Brigid Moses  [id number]    Subjective:    Awake and alert  No complaints  Feliz with marisela urine     Review of Systems  Constitutional: No fever or chills   Respiratory: negative for cough and hemoptysis  Cardiovascular: negative for chest pain and dyspnea  Gastrointestinal: negative for abdominal pain, diarrhea, nausea and vomiting   : See above  Derm: negative for rash and skin lesion(s)  Neurological: negative for seizures and tremors  Musculoskeletal: Negative    Psychiatric: Negative   All other reviews are negative      Scheduled Meds:   metoprolol succinate  25 mg Oral Daily    bisacodyl  15 mg Oral Once    polyethylene glycol  238 g Oral Once    magnesium citrate  296 mL Oral Once    potassium chloride  40 mEq Oral BID    sodium chloride flush  5-40 mL IntraVENous 2 times per day    meropenem  1,000 mg IntraVENous Q12H    sodium chloride flush  5-40 mL IntraVENous 2 times per day    lidocaine 1 % injection  50 mg IntraDERmal Once    pantoprazole (PROTONIX) 40 mg in sodium chloride (PF) 0.9 % 10 mL injection  40 mg IntraVENous Q12H    polyethylene glycol  17 g Oral BID    amitriptyline  25 mg Oral Nightly    [Held by provider] aspirin  81 mg Oral Daily    atorvastatin  40 mg Oral Daily    Vitamin D  2,000 Units Oral Daily    ferrous sulfate  325 mg Oral Daily with breakfast    hydrOXYzine HCl  10 mg Oral TID    [Held by provider] lisinopril  2.5 mg Oral Daily    megestrol  20 mg Oral Daily    magnesium oxide  400 mg Oral Daily    mirtazapine  15 mg Oral Daily    zinc sulfate  50 mg Oral Daily    sodium chloride flush  5-40 mL IntraVENous 2 times per day    magnesium sulfate  2,000 mg IntraVENous Once       Objective:  Vitals:    04/23/25 0338   BP: (!) 86/65   Pulse: 98   Resp:    Temp: 98.6 °F (37 °C)   SpO2: 97%         Allergies: Bactrim [sulfamethoxazole-trimethoprim]    General Appearance: alert and oriented to person, place and time and in no acute distress  Skin: no rash or

## 2025-04-23 NOTE — PROCEDURES
PROCEDURE NOTE  Date: 4/23/2025   Name: Brigid Moses  YOB: 1970    Procedures  [id number]  [id number]  R sl MIDLINE  Placement 4/23/2025          Ultrasound: YES   R Cephalic      Upper Arm Circumference: 21cm    Size: 4.5/15cm    Exposed Length: 0    Internal Length: 15cm   Cut: 0   Vein Measurement: 0.50  BRISK BLOOD RETURN NOTED, NS FLUSHED, CLAMPED, LABELED AND ALCOHOL CAP APPLIED  PT TALI WELL  SITE INTACT  RN AWARE    Aysha Umana RN  4/23/2025  10:31 AM

## 2025-04-23 NOTE — PROGRESS NOTES
[facility]  [facility]  Progress Note    Chief complain:  complicated UTI      SUBJECTIVE:    Patient is awake, alert , tolerating antibiotics well     ROS:  Constitutional:  denies fever , chills   Cardiovascular: denies chest pain or palpitation   Gastrointestinal: . Denies abdominal pain, diarrhea, no nausea or vomiting  Genitourinary:      Denies  dysuria or burning micturition  Musculoskeletal: no aches or pain   Allergic/Immunologic:  No rash or itching     OBJECTIVE:    Vitals:    04/23/25 0900 04/23/25 1146 04/23/25 1319 04/23/25 1550   BP: 102/71 102/79  (!) 86/62   Pulse: (!) 107 95  95   Resp: 18 24     Temp: 97.8 °F (36.6 °C) 98.6 °F (37 °C)  98.2 °F (36.8 °C)   TempSrc: Oral Axillary     SpO2: 99% 95%  96%   Weight:       Height:   1.575 m (5' 2.01\")        HEENT :         unremarkable   Heart:             RRR,  No murmurs, no gallops  Lungs:            clear to auscultation, no wheezing , no rales  Abdomen:       soft, non tender, bowel sounds present  Extremites:     No edema, no ulcers,  Skin:                Normal turgor,normal texture  Lines :             peripheral               Feliz catheter :   present                        Current Facility-Administered Medications   Medication Dose Route Frequency Provider Last Rate Last Admin    metoprolol succinate (TOPROL XL) extended release tablet 25 mg  25 mg Oral Daily Tom Dodge MD   25 mg at 04/23/25 0928    magnesium citrate solution 296 mL  296 mL Oral Once Vincenzo Khan MD        lactated ringers infusion   IntraVENous Continuous Wili Walker DO 75 mL/hr at 04/23/25 0926 New Bag at 04/23/25 0926    potassium chloride (KLOR-CON M) extended release tablet 40 mEq  40 mEq Oral BID Wili Walker DO   40 mEq at 04/23/25 1208    naloxone 0.4 mg in 10 mL sodium chloride syringe   IntraVENous PRN Hi Meza MD        sodium chloride flush 0.9 % injection 5-40 mL  5-40 mL IntraVENous 2

## 2025-04-24 ENCOUNTER — ANESTHESIA EVENT (OUTPATIENT)
Dept: ENDOSCOPY | Age: 55
DRG: 659 | End: 2025-04-24
Payer: COMMERCIAL

## 2025-04-24 ENCOUNTER — ANESTHESIA (OUTPATIENT)
Dept: ENDOSCOPY | Age: 55
DRG: 659 | End: 2025-04-24
Payer: COMMERCIAL

## 2025-04-24 ENCOUNTER — APPOINTMENT (OUTPATIENT)
Age: 55
DRG: 659 | End: 2025-04-24
Attending: INTERNAL MEDICINE
Payer: COMMERCIAL

## 2025-04-24 PROBLEM — K92.2 ACUTE UPPER GI HEMORRHAGE: Status: RESOLVED | Noted: 2025-04-21 | Resolved: 2025-04-24

## 2025-04-24 LAB
ANION GAP SERPL CALCULATED.3IONS-SCNC: 9 MMOL/L (ref 7–16)
BUN SERPL-MCNC: 12 MG/DL (ref 6–20)
CALCIUM SERPL-MCNC: 7.8 MG/DL (ref 8.6–10.2)
CHLORIDE SERPL-SCNC: 105 MMOL/L (ref 98–107)
CO2 SERPL-SCNC: 25 MMOL/L (ref 22–29)
CREAT SERPL-MCNC: 1 MG/DL (ref 0.5–1)
ECHO AO ASC DIAM: 2.7 CM
ECHO AO ASCENDING AORTA INDEX: 1.9 CM/M2
ECHO AV AREA PEAK VELOCITY: 2 CM2
ECHO AV AREA PEAK VELOCITY: 2 CM2
ECHO AV AREA PEAK VELOCITY: 2.1 CM2
ECHO AV AREA PEAK VELOCITY: 2.1 CM2
ECHO AV AREA VTI: 2 CM2
ECHO AV AREA/BSA VTI: 1.4 CM2/M2
ECHO AV CUSP MM: 2.3 CM
ECHO AV MEAN GRADIENT: 4 MMHG
ECHO AV MEAN VELOCITY: 0.9 M/S
ECHO AV PEAK GRADIENT: 7 MMHG
ECHO AV PEAK GRADIENT: 7 MMHG
ECHO AV PEAK VELOCITY: 1.3 M/S
ECHO AV PEAK VELOCITY: 1.4 M/S
ECHO AV VTI: 26.2 CM
ECHO BSA: 1.41 M2
ECHO EST RA PRESSURE: 3 MMHG
ECHO LA DIAMETER INDEX: 1.9 CM/M2
ECHO LA DIAMETER: 2.7 CM
ECHO LA VOL A-L A2C: 32 ML (ref 22–52)
ECHO LA VOL A-L A4C: 29 ML (ref 22–52)
ECHO LA VOL BP: 30 ML (ref 22–52)
ECHO LA VOL MOD A2C: 25 ML (ref 22–52)
ECHO LA VOL MOD A4C: 25 ML (ref 22–52)
ECHO LA VOL/BSA BIPLANE: 21 ML/M2 (ref 16–34)
ECHO LA VOLUME AREA LENGTH: 36 ML
ECHO LA VOLUME INDEX A-L A2C: 23 ML/M2 (ref 16–34)
ECHO LA VOLUME INDEX A-L A4C: 20 ML/M2 (ref 16–34)
ECHO LA VOLUME INDEX AREA LENGTH: 25 ML/M2 (ref 16–34)
ECHO LA VOLUME INDEX MOD A2C: 18 ML/M2 (ref 16–34)
ECHO LA VOLUME INDEX MOD A4C: 18 ML/M2 (ref 16–34)
ECHO LV EDV A2C: 65 ML
ECHO LV EDV A4C: 75 ML
ECHO LV EDV BP: 71 ML (ref 56–104)
ECHO LV EDV INDEX A4C: 53 ML/M2
ECHO LV EDV INDEX BP: 50 ML/M2
ECHO LV EDV NDEX A2C: 46 ML/M2
ECHO LV EF PHYSICIAN: 42 %
ECHO LV EJECTION FRACTION A2C: 49 %
ECHO LV EJECTION FRACTION A4C: 34 %
ECHO LV EJECTION FRACTION BIPLANE: 42 % (ref 55–100)
ECHO LV ESV A2C: 33 ML
ECHO LV ESV A4C: 49 ML
ECHO LV ESV BP: 41 ML (ref 19–49)
ECHO LV ESV INDEX A2C: 23 ML/M2
ECHO LV ESV INDEX A4C: 35 ML/M2
ECHO LV ESV INDEX BP: 29 ML/M2
ECHO LV FRACTIONAL SHORTENING: 18 % (ref 28–44)
ECHO LV INTERNAL DIMENSION DIASTOLE INDEX: 2.82 CM/M2
ECHO LV INTERNAL DIMENSION DIASTOLIC: 4 CM (ref 3.9–5.3)
ECHO LV INTERNAL DIMENSION SYSTOLIC INDEX: 2.32 CM/M2
ECHO LV INTERNAL DIMENSION SYSTOLIC: 3.3 CM
ECHO LV IVSD: 1 CM (ref 0.6–0.9)
ECHO LV IVSS: 1 CM
ECHO LV MASS 2D: 118.2 G (ref 67–162)
ECHO LV MASS INDEX 2D: 83.3 G/M2 (ref 43–95)
ECHO LV POSTERIOR WALL DIASTOLIC: 0.9 CM (ref 0.6–0.9)
ECHO LV POSTERIOR WALL SYSTOLIC: 1.1 CM
ECHO LV RELATIVE WALL THICKNESS RATIO: 0.45
ECHO LVOT AREA: 3.5 CM2
ECHO LVOT AV VTI INDEX: 0.61
ECHO LVOT DIAM: 2.1 CM
ECHO LVOT MEAN GRADIENT: 2 MMHG
ECHO LVOT PEAK GRADIENT: 3 MMHG
ECHO LVOT PEAK GRADIENT: 3 MMHG
ECHO LVOT PEAK VELOCITY: 0.8 M/S
ECHO LVOT PEAK VELOCITY: 0.9 M/S
ECHO LVOT STROKE VOLUME INDEX: 39 ML/M2
ECHO LVOT SV: 55.4 ML
ECHO LVOT VTI: 16 CM
ECHO MV "A" WAVE DURATION: 152.2 MSEC
ECHO MV A VELOCITY: 0.66 M/S
ECHO MV AREA PHT: 7.3 CM2
ECHO MV AREA VTI: 4 CM2
ECHO MV E DECELERATION TIME (DT): 152.4 MS
ECHO MV E VELOCITY: 0.52 M/S
ECHO MV E/A RATIO: 0.79
ECHO MV LVOT VTI INDEX: 0.88
ECHO MV MAX VELOCITY: 0.8 M/S
ECHO MV MEAN GRADIENT: 1 MMHG
ECHO MV MEAN VELOCITY: 0.4 M/S
ECHO MV PEAK GRADIENT: 2 MMHG
ECHO MV PRESSURE HALF TIME (PHT): 30.1 MS
ECHO MV VTI: 14 CM
ECHO PV MAX VELOCITY: 0.9 M/S
ECHO PV MEAN GRADIENT: 2 MMHG
ECHO PV MEAN VELOCITY: 0.7 M/S
ECHO PV PEAK GRADIENT: 3 MMHG
ECHO PV VTI: 22.5 CM
ECHO PVEIN A DURATION: 148.4 MS
ECHO PVEIN A VELOCITY: 0.3 M/S
ECHO PVEIN PEAK D VELOCITY: 0.4 M/S
ECHO PVEIN PEAK S VELOCITY: 0.5 M/S
ECHO PVEIN S/D RATIO: 1.3
ECHO RIGHT VENTRICULAR SYSTOLIC PRESSURE (RVSP): 18 MMHG
ECHO RV INTERNAL DIMENSION: 2.2 CM
ECHO RV LONGITUDINAL DIMENSION: 6.7 CM
ECHO RV MID DIMENSION: 2.7 CM
ECHO RVOT MEAN GRADIENT: 1 MMHG
ECHO RVOT PEAK GRADIENT: 2 MMHG
ECHO RVOT PEAK VELOCITY: 0.6 M/S
ECHO RVOT VTI: 14.5 CM
ECHO TV REGURGITANT MAX VELOCITY: 1.96 M/S
ECHO TV REGURGITANT PEAK GRADIENT: 15 MMHG
GFR, ESTIMATED: 65 ML/MIN/1.73M2
GLUCOSE SERPL-MCNC: 91 MG/DL (ref 74–99)
HCT VFR BLD AUTO: 26.8 % (ref 34–48)
HGB BLD-MCNC: 8.8 G/DL (ref 11.5–15.5)
MAGNESIUM SERPL-MCNC: 2.2 MG/DL (ref 1.6–2.6)
POTASSIUM SERPL-SCNC: 2.9 MMOL/L (ref 3.5–5)
SODIUM SERPL-SCNC: 139 MMOL/L (ref 132–146)

## 2025-04-24 PROCEDURE — 99233 SBSQ HOSP IP/OBS HIGH 50: CPT | Performed by: INTERNAL MEDICINE

## 2025-04-24 PROCEDURE — 3609017100 HC EGD: Performed by: INTERNAL MEDICINE

## 2025-04-24 PROCEDURE — 85018 HEMOGLOBIN: CPT

## 2025-04-24 PROCEDURE — 6360000002 HC RX W HCPCS: Performed by: HOSPITALIST

## 2025-04-24 PROCEDURE — 2709999900 HC NON-CHARGEABLE SUPPLY: Performed by: INTERNAL MEDICINE

## 2025-04-24 PROCEDURE — 6370000000 HC RX 637 (ALT 250 FOR IP): Performed by: NURSE PRACTITIONER

## 2025-04-24 PROCEDURE — 3700000000 HC ANESTHESIA ATTENDED CARE: Performed by: INTERNAL MEDICINE

## 2025-04-24 PROCEDURE — 7100000011 HC PHASE II RECOVERY - ADDTL 15 MIN: Performed by: INTERNAL MEDICINE

## 2025-04-24 PROCEDURE — 83735 ASSAY OF MAGNESIUM: CPT

## 2025-04-24 PROCEDURE — 0DJ08ZZ INSPECTION OF UPPER INTESTINAL TRACT, VIA NATURAL OR ARTIFICIAL OPENING ENDOSCOPIC: ICD-10-PCS | Performed by: INTERNAL MEDICINE

## 2025-04-24 PROCEDURE — 6370000000 HC RX 637 (ALT 250 FOR IP): Performed by: HOSPITALIST

## 2025-04-24 PROCEDURE — 6360000002 HC RX W HCPCS: Performed by: INTERNAL MEDICINE

## 2025-04-24 PROCEDURE — 2580000003 HC RX 258: Performed by: HOSPITALIST

## 2025-04-24 PROCEDURE — 3700000001 HC ADD 15 MINUTES (ANESTHESIA): Performed by: INTERNAL MEDICINE

## 2025-04-24 PROCEDURE — 36415 COLL VENOUS BLD VENIPUNCTURE: CPT

## 2025-04-24 PROCEDURE — 6370000000 HC RX 637 (ALT 250 FOR IP): Performed by: INTERNAL MEDICINE

## 2025-04-24 PROCEDURE — 85014 HEMATOCRIT: CPT

## 2025-04-24 PROCEDURE — 7100000010 HC PHASE II RECOVERY - FIRST 15 MIN: Performed by: INTERNAL MEDICINE

## 2025-04-24 PROCEDURE — 2580000003 HC RX 258: Performed by: NURSE ANESTHETIST, CERTIFIED REGISTERED

## 2025-04-24 PROCEDURE — 80048 BASIC METABOLIC PNL TOTAL CA: CPT

## 2025-04-24 PROCEDURE — 93306 TTE W/DOPPLER COMPLETE: CPT

## 2025-04-24 PROCEDURE — 2580000003 HC RX 258: Performed by: INTERNAL MEDICINE

## 2025-04-24 PROCEDURE — 6360000002 HC RX W HCPCS: Performed by: NURSE ANESTHETIST, CERTIFIED REGISTERED

## 2025-04-24 PROCEDURE — 2060000000 HC ICU INTERMEDIATE R&B

## 2025-04-24 RX ORDER — POTASSIUM CHLORIDE 1500 MG/1
40 TABLET, EXTENDED RELEASE ORAL 3 TIMES DAILY
Status: DISCONTINUED | OUTPATIENT
Start: 2025-04-24 | End: 2025-04-25 | Stop reason: HOSPADM

## 2025-04-24 RX ORDER — SODIUM CHLORIDE 9 MG/ML
INJECTION, SOLUTION INTRAVENOUS
Status: DISCONTINUED | OUTPATIENT
Start: 2025-04-24 | End: 2025-04-24 | Stop reason: SDUPTHER

## 2025-04-24 RX ORDER — POLYETHYLENE GLYCOL 3350 17 G/17G
238 POWDER, FOR SOLUTION ORAL ONCE
Status: COMPLETED | OUTPATIENT
Start: 2025-04-24 | End: 2025-04-24

## 2025-04-24 RX ORDER — BISACODYL 5 MG/1
10 TABLET, DELAYED RELEASE ORAL ONCE
Status: COMPLETED | OUTPATIENT
Start: 2025-04-24 | End: 2025-04-24

## 2025-04-24 RX ORDER — PANTOPRAZOLE SODIUM 40 MG/1
40 TABLET, DELAYED RELEASE ORAL
Status: DISCONTINUED | OUTPATIENT
Start: 2025-04-25 | End: 2025-04-25 | Stop reason: HOSPADM

## 2025-04-24 RX ORDER — HEPARIN SODIUM 5000 [USP'U]/ML
5000 INJECTION, SOLUTION INTRAVENOUS; SUBCUTANEOUS 2 TIMES DAILY
Status: DISCONTINUED | OUTPATIENT
Start: 2025-04-24 | End: 2025-04-25 | Stop reason: HOSPADM

## 2025-04-24 RX ORDER — PROPOFOL 10 MG/ML
INJECTION, EMULSION INTRAVENOUS
Status: DISCONTINUED | OUTPATIENT
Start: 2025-04-24 | End: 2025-04-24 | Stop reason: SDUPTHER

## 2025-04-24 RX ADMIN — MEGESTROL ACETATE 20 MG: 40 TABLET ORAL at 13:48

## 2025-04-24 RX ADMIN — BISACODYL 10 MG: 5 TABLET, COATED ORAL at 17:53

## 2025-04-24 RX ADMIN — AMITRIPTYLINE HYDROCHLORIDE 25 MG: 25 TABLET, FILM COATED ORAL at 22:42

## 2025-04-24 RX ADMIN — POTASSIUM CHLORIDE 40 MEQ: 1500 TABLET, EXTENDED RELEASE ORAL at 22:42

## 2025-04-24 RX ADMIN — MEROPENEM 1000 MG: 1 INJECTION INTRAVENOUS at 01:02

## 2025-04-24 RX ADMIN — MEROPENEM 1000 MG: 1 INJECTION INTRAVENOUS at 14:09

## 2025-04-24 RX ADMIN — FERROUS SULFATE TAB 325 MG (65 MG ELEMENTAL FE) 325 MG: 325 (65 FE) TAB at 13:35

## 2025-04-24 RX ADMIN — ATORVASTATIN CALCIUM 40 MG: 40 TABLET, FILM COATED ORAL at 13:35

## 2025-04-24 RX ADMIN — PROPOFOL 210 MG: 10 INJECTION, EMULSION INTRAVENOUS at 11:26

## 2025-04-24 RX ADMIN — MIRTAZAPINE 15 MG: 15 TABLET, FILM COATED ORAL at 22:43

## 2025-04-24 RX ADMIN — HYDROXYZINE HYDROCHLORIDE 10 MG: 10 TABLET, FILM COATED ORAL at 22:43

## 2025-04-24 RX ADMIN — PHENYLEPHRINE HYDROCHLORIDE 100 MCG: 10 INJECTION INTRAVENOUS at 11:30

## 2025-04-24 RX ADMIN — HYDROXYZINE HYDROCHLORIDE 10 MG: 10 TABLET, FILM COATED ORAL at 13:35

## 2025-04-24 RX ADMIN — POTASSIUM CHLORIDE: 2 INJECTION, SOLUTION, CONCENTRATE INTRAVENOUS at 10:30

## 2025-04-24 RX ADMIN — MAGNESIUM OXIDE 400 MG: 400 TABLET ORAL at 13:34

## 2025-04-24 RX ADMIN — POLYETHYLENE GLYCOL 3350 238 G: 17 POWDER, FOR SOLUTION ORAL at 17:58

## 2025-04-24 RX ADMIN — PANTOPRAZOLE SODIUM 40 MG: 40 INJECTION, POWDER, FOR SOLUTION INTRAVENOUS at 02:45

## 2025-04-24 RX ADMIN — METOPROLOL SUCCINATE 12.5 MG: 25 TABLET, EXTENDED RELEASE ORAL at 13:35

## 2025-04-24 RX ADMIN — SODIUM CHLORIDE: 9 INJECTION, SOLUTION INTRAVENOUS at 11:19

## 2025-04-24 RX ADMIN — POLYETHYLENE GLYCOL 3350 17 G: 17 POWDER, FOR SOLUTION ORAL at 13:49

## 2025-04-24 RX ADMIN — CHOLECALCIFEROL TAB 25 MCG (1000 UNIT) 2000 UNITS: 25 TAB at 13:34

## 2025-04-24 RX ADMIN — Medication 50 MG: at 13:49

## 2025-04-24 RX ADMIN — POTASSIUM CHLORIDE 40 MEQ: 1500 TABLET, EXTENDED RELEASE ORAL at 13:49

## 2025-04-24 ASSESSMENT — LIFESTYLE VARIABLES: SMOKING_STATUS: 1

## 2025-04-24 NOTE — ANESTHESIA POSTPROCEDURE EVALUATION
Department of Anesthesiology  Postprocedure Note    Patient: Brigid Moses  MRN: [id number]  YOB: 1970  Date of evaluation: 4/24/2025    Procedure Summary       Date: 04/24/25 Room / Location: [place] [address] / [facility]    Anesthesia Start: 1119 Anesthesia Stop: 1142    Procedure: ESOPHAGOGASTRODUODENOSCOPY Diagnosis:       Acute anemia      (Acute anemia [D64.9])    Surgeons: Dno Truong MD Responsible Provider: Hi Meza MD    Anesthesia Type: MAC ASA Status: 3            Anesthesia Type: No value filed.    Radha Phase I: Radha Score: 8    Radha Phase II:      Anesthesia Post Evaluation    Patient location during evaluation: PACU  Patient participation: complete - patient participated  Level of consciousness: awake  Airway patency: patent  Nausea & Vomiting: no nausea and no vomiting  Cardiovascular status: hemodynamically stable  Respiratory status: acceptable  Hydration status: euvolemic  Pain management: adequate    No notable events documented.

## 2025-04-24 NOTE — ANESTHESIA PRE PROCEDURE
Department of Anesthesiology  Preprocedure Note       Name:  Brigid Moses   Age:  55 y.o.  :  1970                                          MRN:  [id number]         Date:  2025      Surgeon: Surgeon(s):  Don Truong MD    Procedure: Procedure(s):  ESOPHAGOGASTRODUODENOSCOPY  COLONOSCOPY    Medications prior to admission:   Prior to Admission medications    Medication Sig Start Date End Date Taking? Authorizing Provider   ertapenem (INVANZ) infusion Infuse 1,000 mg intravenously every 24 hours for 14 days Compound per protocol. 25 Yes Ana Rosa Mcnulty MD   MELATONIN PO Take 8 mg by mouth nightly   Yes ProviderRuchi MD   sucralfate (CARAFATE) 1 GM tablet Take 1 tablet by mouth 4 times daily   Yes Ruchi Bowers MD   vitamin B-12 (CYANOCOBALAMIN) 1000 MCG tablet Take 1 tablet by mouth daily   Yes ProviderRuchi MD   LACTOBACILLUS PO Take 1 capsule by mouth daily   Yes ProviderRuchi MD   linaCLOtide (LINZESS) 72 MCG CAPS capsule Take 1 capsule by mouth every morning (before breakfast)   Yes ProviderRuchi MD   pantoprazole (PROTONIX) 40 MG tablet Take 1 tablet by mouth in the morning and at bedtime   Yes ProviderRuchi MD   aspirin 81 MG EC tablet Take 1 tablet by mouth daily   Yes Ruchi Bowers MD   hydrOXYzine HCl (ATARAX) 10 MG tablet Take 1 tablet by mouth in the morning, at noon, and at bedtime   Yes ProviderRuchi MD   lisinopril (PRINIVIL;ZESTRIL) 2.5 MG tablet Take 1 tablet by mouth daily Hold for SBP less than 100   Yes ProviderRuchi MD   megestrol (MEGACE) 20 MG tablet Take 1 tablet by mouth daily   Yes ProviderRuchi MD   famotidine (PEPCID) 20 MG tablet Take 1 tablet by mouth 2 times daily   Yes Ruchi Bowers MD   mirtazapine (REMERON) 15 MG tablet Take 1 tablet by mouth daily   Yes Ruchi Bowers MD   Cholecalciferol (VITAMIN D) 50 MCG (2000) CAPS capsule Take by mouth daily   Yes

## 2025-04-24 NOTE — CARE COORDINATION
SOCIAL WORK / DISCHARGE PLANNING:  PRECERT obtained for [facility] [place], [address] [phone], Fax [phone], skilled. It is good through 4/30. Pt to have EGD/Cscope today, per Dr floyd talavera next date. Midline in place, IV Invanz x 14 days. Pt is a LTC bedhold. ENRIQUE will need signed by physician.            Electronically signed by KAROLINA Milner on 4/24/2025 at 10:40 AM

## 2025-04-24 NOTE — PROGRESS NOTES
Physician Progress Note      PATIENT:               ZONIA ALLEN  CSN #:                  [id number]  :                       1970  ADMIT DATE:       2025 10:49 AM  DISCH DATE:  RESPONDING  PROVIDER #:        Wili Walker DO          QUERY TEXT:    UTI is documented in the medical record in the urology consult note and IM   progress notes.  Please clarify the relationship, if any, with the chronic   pollack catheter:    The clinical indicators include:  - 55 y.o. female patient with hx of neurogenic bladder, multiple kidney   stones, and chronic pollack presented with possible GI bleeding. (H&P, )  - CT abdomen pelvis showed multiple bilateral ureteral calculi, bilateral   staghorn calculi, emphysematous pyelitis, and bilateral hydronephrosis.   Assessment: UTI with bacteremia. She will be taken to the OR for cystoscopy,   retrograde pyelogram, bilateral ureteral stent insertion. (Urology consult,   )  -  On the left side, there did not appear to be any ureteral obstruction, but   was hydronephrotic with stones in the kidney.  When the catheter passed up to   the left side, purulent urine was obtained. (Op note, )  - Merrem (MAR)  Options provided:  -- UTI related to chronic indwelling urinary catheter  -- UTI not related to indwelling urinary catheter  -- Other - I will add my own diagnosis  -- Disagree - Not applicable / Not valid  -- Disagree - Clinically unable to determine / Unknown  -- Refer to Clinical Documentation Reviewer    PROVIDER RESPONSE TEXT:    UTI is related to the chronic indwelling urinary catheter.    Query created by: Soniya Lemus on 2025 9:04 AM      Electronically signed by:  Wili Walker DO 2025 9:17 AM

## 2025-04-24 NOTE — PROGRESS NOTES
[facility] Hospitalist   Progress Note    Admitting Date and Time: 4/21/2025 10:49 AM  Admit Dx: Acute upper GI hemorrhage [K92.2]    Subjective/interval history:    Pt feels well today, denies any complaints.  No hematemesis, coffee-ground emesis, melena, or hematochezia.    Patient had EGD this morning which did not show any abnormal findings.    ROS: denies fever, chills, cp, sob, n/v, HA unless stated above.     potassium chloride  40 mEq Oral TID    [START ON 4/25/2025] pantoprazole  40 mg Oral QAM AC    polyethylene glycol  238 g Oral Once    bisacodyl  10 mg Oral Once    magnesium citrate  296 mL Oral Once    metoprolol succinate  12.5 mg Oral Daily    sodium chloride flush  5-40 mL IntraVENous 2 times per day    meropenem  1,000 mg IntraVENous Q12H    sodium chloride flush  5-40 mL IntraVENous 2 times per day    lidocaine 1 % injection  50 mg IntraDERmal Once    polyethylene glycol  17 g Oral BID    amitriptyline  25 mg Oral Nightly    [Held by provider] aspirin  81 mg Oral Daily    atorvastatin  40 mg Oral Daily    Vitamin D  2,000 Units Oral Daily    ferrous sulfate  325 mg Oral Daily with breakfast    hydrOXYzine HCl  10 mg Oral TID    megestrol  20 mg Oral Daily    magnesium oxide  400 mg Oral Daily    mirtazapine  15 mg Oral Daily    zinc sulfate  50 mg Oral Daily    sodium chloride flush  5-40 mL IntraVENous 2 times per day    magnesium sulfate  2,000 mg IntraVENous Once     naloxone 0.4 mg in 10 mL sodium chloride syringe, , PRN  sodium chloride flush, 5-40 mL, PRN  sodium chloride, , PRN  prochlorperazine, 5 mg, Once PRN  midazolam, 2 mg, Once PRN  diphenhydrAMINE, 12.5 mg, Once PRN  sodium chloride flush, 5-40 mL, PRN  sodium chloride, , PRN  sodium chloride, , PRN  sodium chloride flush, 5-40 mL, PRN  sodium chloride, , PRN  ondansetron, 4 mg, Q8H PRN   Or  ondansetron, 4 mg, Q6H PRN  polyethylene glycol, 17 g, Daily PRN  acetaminophen, 650 mg, Q6H PRN   Or  acetaminophen, 650 mg,

## 2025-04-24 NOTE — DISCHARGE INSTR - COC
Continuity of Care Form    Patient Name: Brigid Moses   :  1970  MRN:  [id number]    Admit date:  2025  Discharge date:  2025    Code Status Order: Full Code   Advance Directives:     Admitting Physician:  Wili Walker DO  PCP: Yessenia Perry MD    Discharging Nurse: sally [facility] Unit/Room#: 0634/0634-01  Discharging Unit Phone Number: [phone]    Emergency Contact:   Extended Emergency Contact Information  Primary Emergency Contact: BHANU JARVIS  Home Phone: [phone]  Relation: Other Relative   needed? No  Secondary Emergency Contact: АндрейSergey  Address: [address] [place] [address] [place] [address]  Home Phone: [phone]  Mobile Phone: [phone]  Relation: Aunt/Uncle    Past Surgical History:  Past Surgical History:   Procedure Laterality Date    BLADDER SURGERY Bilateral 2022    CYSTOSCOPY RETROGRADE PYELOGRAM BILATERAL  STENT INSERTION performed by Benedicto Eugene DO at [facility] OR    BLADDER SURGERY Bilateral 2022    CYSTOSCOPY RETROGRADE PYELOGRAM,  URETEROSCOPY,  LASER LITHOTRIPSY - BILATERAL, WITH BILATERAL STENT EXCHANGE performed by Prince Mays MD at [facility] OR    COLONOSCOPY      DEBRIDEMENT Bilateral 2025    CYSTOSCOPY QUIÑONES INSERTION performed by Leon Mota MD at [facility] OR    DEBRIDEMENT Bilateral 2025    CYSTOSCOPY RETROGRADE PYELOGRAM BILATERAL STENT INSERTION performed by López Mays MD at [facility] OR    ENDOSCOPY, COLON, DIAGNOSTIC      FOOT SURGERY      right toes    FOOT SURGERY Left 3/27/13    correction 4th, 5th toes left foot    LAPAROTOMY      LITHOTRIPSY Bilateral 3/8/2022    CYSTOSCOPY RETROGRADE PYELOGRAM URETEROSCOPY, LASER LITHOTRIPSY performed by Prince Mays MD at [facility] OR    UPPER GASTROINTESTINAL ENDOSCOPY  2014       Immunization History:   Immunization History   Administered Date(s) Administered    COVID-19, MODERNA BLUE border, Primary or Immunocompromised, (age 12y+), IM,  100 mcg/0.5mL 08/05/2021, 09/02/2021    TDaP, ADACEL (age 10y-64y), BOOSTRIX (age 10y+), IM, 0.5mL 02/17/2019       Active Problems:  Patient Active Problem List   Diagnosis Code    Hammer toe, acquired M20.40    Altered mental status R41.82    Anemia, unspecified D64.9    Dysphagia following cerebral infarction I69.391    Hemiplegia and hemiparesis following cerebral infarction affecting left non-dominant side (HCC) I69.354    Primary hypertension I10    Major depressive disorder, recurrent F33.9    Cerebral artery occlusion with cerebral infarction (HCC) I63.50    Sepsis due to urinary tract infection ([facility]) A41.9, N39.0    Constipation K59.00    Metabolic acidosis E87.20    Acute upper GI hemorrhage K92.2    Acute kidney injury N17.9    Obstructive uropathy N13.9    Demand ischemia ([facility]) I24.89    Sepsis due to Escherichia coli with acute renal failure without septic shock ([facility]) A41.51, R65.20, N17.9    Moderate protein-calorie malnutrition E44.0       Isolation/Infection:   Isolation            Contact          Patient Infection Status        Infection Onset Added Last Indicated Last Indicated By Review Planned Expiration    ESBL (Extended Spectrum Beta Lactamase) 02/13/25 02/15/25 04/21/25 Culture, Blood 1                           Nurse Assessment:  Last Vital Signs: /70   Pulse 89   Temp 98.4 °F (36.9 °C) (Axillary)   Resp 21   Ht 1.575 m (5' 2\")   Wt 45.4 kg (100 lb)   LMP  (LMP Unknown)   SpO2 98%   BMI 18.29 kg/m²     Last documented pain score (0-10 scale): Pain Level: 3  Last Weight:   Wt Readings from Last 1 Encounters:   04/24/25 45.4 kg (100 lb)     Mental Status:  oriented and alert    IV Access:  - midline rt upper arm    Nursing Mobility/ADLs:  Walking   Dependent  Transfer  Dependent  Bathing  Dependent  Dressing  Dependent  Toileting  Dependent  Feeding  Dependent  Med Admin  Dependent  Med Delivery   whole    Wound Care Documentation and Therapy:        Elimination:  Continence:

## 2025-04-24 NOTE — PROGRESS NOTES
Pt has not been wanting to drink bowel prep majority of this shift. Every hour while rounding I have encouraged pt to drink some of the prep so she can have scopes done in AM. Pt keeps telling me she will drink them when she is ready to do so. Education about prep and the need for it was provided. Patient still not wanting to drink any of  the prep at this time.

## 2025-04-24 NOTE — FLOWSHEET NOTE
Inpatient Wound Care    Admit Date: 4/21/2025 10:49 AM    Reason for consult:  sacrum    Significant history:  Per H&P:    Brigid Moses is a 55 y.o. female with a history of HTN and stroke with hemiplegia and neurogenic bladder with chronic pollack who presents with GI Bleeding (Sent from nursing home for possible GI bleed, patient states no complaints)  Pt reports that she was sent in by GI specialist for 'stroke watch' becaue she needs a scope and blood thinners must be stopped a few days prior to procedure. She had a stroke 'several' years ago and is on asa 81mg daily. She had been on apixaban for a DVT, but this was stopped ~1 week ago after US revealed resolution of DVT.   She has longstanding kidney stones, was supposed to see urologist tomorrow, but was told it has to be rescheduled because scope is more pressing at this time. She reports bilat flank pain-only when stones move; no ain currently but has had this pain in the last few days. She denies seeing any melena or hematochezia, but also notes that she wears a brief and is cleaned up by staff. She reports that they started checking her stool for blood a couple months ago and it was initially negative, but now is positive for blood. Denies CP, SOB, or any other symptoms or complaints.   Hypotensive in ER, remainder of VS stable. Hgb 7.9, K 3.2, sCr 1.2, CO2 16, liver enzymes elevated but <100. Troponins 288, 284. CTAP notes bilat kidney obtructions with stones throughout the renal collecting systems and proximal ureters and 8mm stone in right distal ureter. Also notes mild distention of GB, mild intrahepatic & extrahepatic biliary ductal dilatation and large stool burden with likely fecal impaction.   While in ER, pt received 1L IVF bolus, IV potassium replacement and 80mg protonix IV.   GI & urology consulted from ER.      Findings:     04/24/25 1038   Skin Integrity Site 2   Skin Integrity Location 2   (blanchable redness)   Location 2 sacrum   Preventative  Dressing Yes   Dressing Site Sacrum   Date Applied 04/24/25   Assessed this shift Yes   Skin Integrity Site 3   Skin Integrity Location 3   (dry/flaky)    Location 3 bilateral LE       Impression:  Blanchable redness to sacral area, arvind prominences   LE have dry flaky skin    Plan: Preventative foam dressing to sacrum  Thick moisturizing lotion   Zinc clear ointment  TAPS  Heel Protectors  Will need continued preventative care       Mary Jain RN 4/24/2025 10:39 AM

## 2025-04-24 NOTE — CARE COORDINATION
Patient seen and examined prior to procedure.  Per nursing did not drink all of her prep.  Patient denies abdominal pain.  H&P reviewed -no new changes except as described above.  Vitals:    04/24/25 0744   BP: 104/70   Pulse: 89   Resp: 21   Temp: 98.4 °F (36.9 °C)   SpO2: 98%     Plan: Proceed with upper endoscopy followed by colonoscopy after tapwater enema.  Please, see orders for plan of care    Thank you for the opportunity to see this patient in consultation    Vincenzo Khan MD

## 2025-04-24 NOTE — PROGRESS NOTES
[facility]  [facility]  Progress Note    Chief complain:  complicated UTI      SUBJECTIVE:    Patient is awake, alert , tolerating antibiotics well     ROS:  Constitutional:  denies fever , chills   Cardiovascular: denies chest pain or palpitation   Gastrointestinal: . Denies abdominal pain, diarrhea, no nausea or vomiting  Genitourinary:      Denies  dysuria or burning micturition  Musculoskeletal: no aches or pain   Allergic/Immunologic:  No rash or itching     OBJECTIVE:    Vitals:    04/23/25 1550 04/23/25 1954 04/24/25 0029 04/24/25 0744   BP: (!) 86/62 94/66 103/67 104/70   Pulse: 95 92 85 89   Resp:  20  21   Temp: 98.2 °F (36.8 °C) 97.9 °F (36.6 °C) 98.2 °F (36.8 °C) 98.4 °F (36.9 °C)   TempSrc:  Axillary  Axillary   SpO2: 96% 96% 96% 98%   Weight:       Height:           HEENT :         unremarkable   Heart:             RRR,  No murmurs, no gallops  Lungs:            clear to auscultation, no wheezing , no rales  Abdomen:       soft, non tender, bowel sounds present  Extremites:     No edema, no ulcers,  Skin:                Normal turgor,normal texture  Lines :             peripheral               Feliz catheter :   present                        Current Facility-Administered Medications   Medication Dose Route Frequency Provider Last Rate Last Admin    magnesium citrate solution 296 mL  296 mL Oral Once Vincenzo Khan MD        lactated ringers infusion   IntraVENous Continuous Wili Walker DO 75 mL/hr at 04/23/25 0926 New Bag at 04/23/25 0926    metoprolol succinate (TOPROL XL) extended release tablet 12.5 mg  12.5 mg Oral Daily Tom Dodge MD        naloxone 0.4 mg in 10 mL sodium chloride syringe   IntraVENous PRN Hi Meza MD        sodium chloride flush 0.9 % injection 5-40 mL  5-40 mL IntraVENous 2 times per day Hi Meza MD   10 mL at 04/22/25 2026    sodium chloride flush 0.9 % injection 5-40 mL  5-40 mL IntraVENous PRN Hi Meza

## 2025-04-24 NOTE — PLAN OF CARE
Problem: Chronic Conditions and Co-morbidities  Goal: Patient's chronic conditions and co-morbidity symptoms are monitored and maintained or improved  4/24/2025 1120 by Kimmie Dyson, RN  Outcome: Progressing  Flowsheets (Taken 4/24/2025 1120)  Care Plan - Patient's Chronic Conditions and Co-Morbidity Symptoms are Monitored and Maintained or Improved:   Monitor and assess patient's chronic conditions and comorbid symptoms for stability, deterioration, or improvement   Collaborate with multidisciplinary team to address chronic and comorbid conditions and prevent exacerbation or deterioration   Update acute care plan with appropriate goals if chronic or comorbid symptoms are exacerbated and prevent overall improvement and discharge     Problem: Discharge Planning  Goal: Discharge to home or other facility with appropriate resources  4/24/2025 1120 by Kimmie Dyson, RN  Outcome: Progressing  Flowsheets (Taken 4/21/2025 2059 by Caroline Snyder, RN)  Discharge to home or other facility with appropriate resources:   Identify barriers to discharge with patient and caregiver   Identify discharge learning needs (meds, wound care, etc)   Refer to discharge planning if patient needs post-hospital services based on physician order or complex needs related to functional status, cognitive ability or social support system   Arrange for needed discharge resources and transportation as appropriate     Problem: Safety - Adult  Goal: Free from fall injury  4/24/2025 1120 by Kimmie Dyson, RN  Outcome: Progressing  Flowsheets (Taken 4/24/2025 1120)  Free From Fall Injury:   Instruct family/caregiver on patient safety   Based on caregiver fall risk screen, instruct family/caregiver to ask for assistance with transferring infant if caregiver noted to have fall risk factors     Problem: Skin/Tissue Integrity  Goal: Skin integrity remains intact  Description: 1.  Monitor for areas of redness and/or skin breakdown2.  Assess vascular  access sites hourly3.  Every 4-6 hours minimum:  Change oxygen saturation probe site4.  Every 4-6 hours:  If on nasal continuous positive airway pressure, respiratory therapy assess nares and determine need for appliance change or resting period  4/24/2025 1120 by Kimmie Dyson, RN  Outcome: Progressing  Flowsheets (Taken 4/24/2025 1120)  Skin Integrity Remains Intact:   Monitor for areas of redness and/or skin breakdown   Every 4-6 hours minimum:  Change oxygen saturation probe site   Every 4-6 hours:  If on nasal continuous positive airway pressure, assess nares and determine need for appliance change or resting period   Turn and reposition as indicated     Problem: Nutrition Deficit:  Goal: Optimize nutritional status  4/24/2025 1120 by Kimmie Dyson, RN  Outcome: Progressing  Flowsheets (Taken 4/23/2025 1332 by Rina Arthur, RD, LD)  Nutrient intake appropriate for improving, restoring, or maintaining nutritional needs:   Assess nutritional status and recommend course of action   Monitor oral intake, labs, and treatment plans   Order, calculate, and assess calorie counts as needed   Recommend appropriate diets, oral nutritional supplements, and vitamin/mineral supplements

## 2025-04-24 NOTE — OP NOTE
Operative Note      Patient: Brigid Moses  YOB: 1970  MRN: [id number]    Date of Procedure: 4/24/2025    Pre-Op Diagnosis Codes:      * Acute anemia [D64.9]    Post-Op Diagnosis: Anatomical study       Procedure(s):  ESOPHAGOGASTRODUODENOSCOPY  COLONOSCOPY    Surgeon(s):  Don Truong MD    Assistant:   Surgical Assistant: Candis Al RN    Anesthesia: Monitor Anesthesia Care    Estimated Blood Loss (mL): None    Complications: None    Specimens:   * No specimens in log *    Implants:  * No implants in log *      Drains:   Urinary Catheter 04/22/25 Feliz (Active)   Catheter Indications Urinary retention (acute or chronic), continuous bladder irrigation or bladder outlet obstruction 04/24/25 0800   Site Assessment No urethral drainage 04/24/25 0800   Urine Color Yellow 04/24/25 0800   Urine Appearance Cloudy 04/24/25 0800   Collection Container Standard 04/24/25 0800   Securement Method Securing device (Describe) 04/24/25 0800   Catheter Care  Soap and water 04/24/25 0534   Catheter Best Practices  Drainage tube clipped to bed;Bag below bladder;Bag not on floor;Catheter secured to thigh;Lack of dependent loop in tubing;Drainage bag less than half full 04/24/25 0800   Status Patent;Draining 04/24/25 0800   Output (mL) 1200 mL 04/24/25 0524       [REMOVED] Urinary Catheter 04/21/25 (Removed)   Catheter Indications Urinary retention (acute or chronic), continuous bladder irrigation or bladder outlet obstruction 04/21/25 2100   Site Assessment No urethral drainage 04/21/25 2100   Urine Color Yellow 04/21/25 2100   Urine Appearance Cloudy 04/21/25 2100   Collection Container Standard 04/21/25 2100   Securement Method Securing device (Describe) 04/21/25 2100   Catheter Care  Soap and water 04/21/25 2100   Catheter Best Practices  Drainage tube clipped to bed;Catheter secured to thigh;Tamper seal intact;Bag below bladder;Bag not on floor;Lack of dependent loop in tubing;Drainage bag less than

## 2025-04-24 NOTE — PLAN OF CARE
Problem: Chronic Conditions and Co-morbidities  Goal: Patient's chronic conditions and co-morbidity symptoms are monitored and maintained or improved  Outcome: Progressing     Problem: Discharge Planning  Goal: Discharge to home or other facility with appropriate resources  Outcome: Progressing     Problem: Safety - Adult  Goal: Free from fall injury  Outcome: Progressing     Problem: Skin/Tissue Integrity  Goal: Skin integrity remains intact  Description: 1.  Monitor for areas of redness and/or skin breakdown2.  Assess vascular access sites hourly3.  Every 4-6 hours minimum:  Change oxygen saturation probe site4.  Every 4-6 hours:  If on nasal continuous positive airway pressure, respiratory therapy assess nares and determine need for appliance change or resting period  Outcome: Progressing     Problem: Nutrition Deficit:  Goal: Optimize nutritional status  Outcome: Progressing

## 2025-04-25 ENCOUNTER — ANESTHESIA (OUTPATIENT)
Dept: ENDOSCOPY | Age: 55
DRG: 659 | End: 2025-04-25
Payer: COMMERCIAL

## 2025-04-25 ENCOUNTER — ANESTHESIA EVENT (OUTPATIENT)
Dept: ENDOSCOPY | Age: 55
DRG: 659 | End: 2025-04-25
Payer: COMMERCIAL

## 2025-04-25 VITALS
SYSTOLIC BLOOD PRESSURE: 103 MMHG | HEART RATE: 88 BPM | RESPIRATION RATE: 18 BRPM | TEMPERATURE: 97.9 F | DIASTOLIC BLOOD PRESSURE: 80 MMHG | OXYGEN SATURATION: 99 % | WEIGHT: 100 LBS | BODY MASS INDEX: 18.4 KG/M2 | HEIGHT: 62 IN

## 2025-04-25 LAB
ACB COMPLEX DNA BLD POS QL NAA+NON-PROBE: NOT DETECTED
ANION GAP SERPL CALCULATED.3IONS-SCNC: 8 MMOL/L (ref 7–16)
B FRAGILIS DNA BLD POS QL NAA+NON-PROBE: NOT DETECTED
BIOFIRE TEST COMMENT: ABNORMAL
BLACTX-M ISLT/SPM QL: DETECTED
BLAIMP ISLT/SPM QL: NOT DETECTED
BLAKPC ISLT/SPM QL: NOT DETECTED
BLAOXA-48-LIKE ISLT/SPM QL: NOT DETECTED
BLAVIM ISLT/SPM QL: NOT DETECTED
BUN SERPL-MCNC: 9 MG/DL (ref 6–20)
C ALBICANS DNA BLD POS QL NAA+NON-PROBE: NOT DETECTED
C AURIS DNA BLD POS QL NAA+NON-PROBE: NOT DETECTED
C GATTII+NEOFOR DNA BLD POS QL NAA+N-PRB: NOT DETECTED
C GLABRATA DNA BLD POS QL NAA+NON-PROBE: NOT DETECTED
C KRUSEI DNA BLD POS QL NAA+NON-PROBE: NOT DETECTED
C PARAP DNA BLD POS QL NAA+NON-PROBE: NOT DETECTED
C TROPICLS DNA BLD POS QL NAA+NON-PROBE: NOT DETECTED
CALCIUM SERPL-MCNC: 8 MG/DL (ref 8.6–10.2)
CHLORIDE SERPL-SCNC: 108 MMOL/L (ref 98–107)
CO2 SERPL-SCNC: 20 MMOL/L (ref 22–29)
COLISTIN RES MCR-1 ISLT/SPM QL: NOT DETECTED
CREAT SERPL-MCNC: 0.9 MG/DL (ref 0.5–1)
E CLOAC COMP DNA BLD POS NAA+NON-PROBE: NOT DETECTED
E COLI DNA BLD POS QL NAA+NON-PROBE: DETECTED
E FAECALIS DNA BLD POS QL NAA+NON-PROBE: NOT DETECTED
E FAECIUM DNA BLD POS QL NAA+NON-PROBE: NOT DETECTED
ENTEROBACTERALES DNA BLD POS NAA+N-PRB: DETECTED
GFR, ESTIMATED: 81 ML/MIN/1.73M2
GLUCOSE SERPL-MCNC: 88 MG/DL (ref 74–99)
GP B STREP DNA BLD POS QL NAA+NON-PROBE: NOT DETECTED
HAEM INFLU DNA BLD POS QL NAA+NON-PROBE: NOT DETECTED
K OXYTOCA DNA BLD POS QL NAA+NON-PROBE: NOT DETECTED
KLEBSIELLA SP DNA BLD POS QL NAA+NON-PRB: NOT DETECTED
KLEBSIELLA SP DNA BLD POS QL NAA+NON-PRB: NOT DETECTED
L MONOCYTOG DNA BLD POS QL NAA+NON-PROBE: NOT DETECTED
MICROORGANISM SPEC CULT: ABNORMAL
MICROORGANISM SPEC CULT: ABNORMAL
MICROORGANISM/AGENT SPEC: ABNORMAL
MICROORGANISM/AGENT SPEC: ABNORMAL
N MEN DNA BLD POS QL NAA+NON-PROBE: NOT DETECTED
P AERUGINOSA DNA BLD POS NAA+NON-PROBE: NOT DETECTED
POTASSIUM SERPL-SCNC: 4.1 MMOL/L (ref 3.5–5)
PROTEUS SP DNA BLD POS QL NAA+NON-PROBE: NOT DETECTED
RESISTANT GENE NDM BY PCR: NOT DETECTED
S AUREUS DNA BLD POS QL NAA+NON-PROBE: NOT DETECTED
S AUREUS+CONS DNA BLD POS NAA+NON-PROBE: NOT DETECTED
S EPIDERMIDIS DNA BLD POS QL NAA+NON-PRB: NOT DETECTED
S LUGDUNENSIS DNA BLD POS QL NAA+NON-PRB: NOT DETECTED
S MALTOPHILIA DNA BLD POS QL NAA+NON-PRB: NOT DETECTED
S MARCESCENS DNA BLD POS NAA+NON-PROBE: NOT DETECTED
S PNEUM DNA BLD POS QL NAA+NON-PROBE: NOT DETECTED
S PYO DNA BLD POS QL NAA+NON-PROBE: NOT DETECTED
SALMONELLA DNA BLD POS QL NAA+NON-PROBE: NOT DETECTED
SERVICE CMNT-IMP: ABNORMAL
SERVICE CMNT-IMP: ABNORMAL
SODIUM SERPL-SCNC: 136 MMOL/L (ref 132–146)
SPECIMEN DESCRIPTION: ABNORMAL
SPECIMEN DESCRIPTION: ABNORMAL
STREPTOCOCCUS DNA BLD POS NAA+NON-PROBE: NOT DETECTED

## 2025-04-25 PROCEDURE — 99239 HOSP IP/OBS DSCHRG MGMT >30: CPT | Performed by: INTERNAL MEDICINE

## 2025-04-25 PROCEDURE — 6360000002 HC RX W HCPCS: Performed by: INTERNAL MEDICINE

## 2025-04-25 PROCEDURE — 0DJD8ZZ INSPECTION OF LOWER INTESTINAL TRACT, VIA NATURAL OR ARTIFICIAL OPENING ENDOSCOPIC: ICD-10-PCS | Performed by: INTERNAL MEDICINE

## 2025-04-25 PROCEDURE — 6360000002 HC RX W HCPCS: Performed by: NURSE ANESTHETIST, CERTIFIED REGISTERED

## 2025-04-25 PROCEDURE — 80048 BASIC METABOLIC PNL TOTAL CA: CPT

## 2025-04-25 PROCEDURE — 6370000000 HC RX 637 (ALT 250 FOR IP): Performed by: HOSPITALIST

## 2025-04-25 PROCEDURE — 2709999900 HC NON-CHARGEABLE SUPPLY: Performed by: INTERNAL MEDICINE

## 2025-04-25 PROCEDURE — 6370000000 HC RX 637 (ALT 250 FOR IP): Performed by: NURSE PRACTITIONER

## 2025-04-25 PROCEDURE — 2500000003 HC RX 250 WO HCPCS: Performed by: SPECIALIST

## 2025-04-25 PROCEDURE — 36415 COLL VENOUS BLD VENIPUNCTURE: CPT

## 2025-04-25 PROCEDURE — 3700000001 HC ADD 15 MINUTES (ANESTHESIA): Performed by: INTERNAL MEDICINE

## 2025-04-25 PROCEDURE — 6370000000 HC RX 637 (ALT 250 FOR IP): Performed by: INTERNAL MEDICINE

## 2025-04-25 PROCEDURE — 3700000000 HC ANESTHESIA ATTENDED CARE: Performed by: INTERNAL MEDICINE

## 2025-04-25 PROCEDURE — 2580000003 HC RX 258: Performed by: NURSE ANESTHETIST, CERTIFIED REGISTERED

## 2025-04-25 PROCEDURE — 3609027000 HC COLONOSCOPY: Performed by: INTERNAL MEDICINE

## 2025-04-25 PROCEDURE — 7100000010 HC PHASE II RECOVERY - FIRST 15 MIN: Performed by: INTERNAL MEDICINE

## 2025-04-25 PROCEDURE — 2580000003 HC RX 258: Performed by: INTERNAL MEDICINE

## 2025-04-25 PROCEDURE — 7100000011 HC PHASE II RECOVERY - ADDTL 15 MIN: Performed by: INTERNAL MEDICINE

## 2025-04-25 RX ORDER — METOPROLOL SUCCINATE 25 MG/1
12.5 TABLET, EXTENDED RELEASE ORAL DAILY
DISCHARGE
Start: 2025-04-26

## 2025-04-25 RX ORDER — SODIUM CHLORIDE 9 MG/ML
INJECTION, SOLUTION INTRAVENOUS
Status: DISCONTINUED | OUTPATIENT
Start: 2025-04-25 | End: 2025-04-25 | Stop reason: SDUPTHER

## 2025-04-25 RX ORDER — PROPOFOL 10 MG/ML
INJECTION, EMULSION INTRAVENOUS
Status: DISCONTINUED | OUTPATIENT
Start: 2025-04-25 | End: 2025-04-25 | Stop reason: SDUPTHER

## 2025-04-25 RX ADMIN — MEROPENEM 1000 MG: 1 INJECTION INTRAVENOUS at 04:26

## 2025-04-25 RX ADMIN — PROPOFOL 20 MG: 10 INJECTION, EMULSION INTRAVENOUS at 13:25

## 2025-04-25 RX ADMIN — PROPOFOL 20 MG: 10 INJECTION, EMULSION INTRAVENOUS at 13:21

## 2025-04-25 RX ADMIN — PROPOFOL 30 MG: 10 INJECTION, EMULSION INTRAVENOUS at 13:18

## 2025-04-25 RX ADMIN — SODIUM CHLORIDE: 9 INJECTION, SOLUTION INTRAVENOUS at 13:12

## 2025-04-25 RX ADMIN — HYDROXYZINE HYDROCHLORIDE 10 MG: 10 TABLET, FILM COATED ORAL at 14:31

## 2025-04-25 RX ADMIN — ATORVASTATIN CALCIUM 40 MG: 40 TABLET, FILM COATED ORAL at 08:17

## 2025-04-25 RX ADMIN — POLYETHYLENE GLYCOL 3350 17 G: 17 POWDER, FOR SOLUTION ORAL at 08:13

## 2025-04-25 RX ADMIN — HYDROXYZINE HYDROCHLORIDE 10 MG: 10 TABLET, FILM COATED ORAL at 08:15

## 2025-04-25 RX ADMIN — PROPOFOL 40 MG: 10 INJECTION, EMULSION INTRAVENOUS at 13:16

## 2025-04-25 RX ADMIN — MAGNESIUM OXIDE 400 MG: 400 TABLET ORAL at 08:15

## 2025-04-25 RX ADMIN — CHOLECALCIFEROL TAB 25 MCG (1000 UNIT) 2000 UNITS: 25 TAB at 08:15

## 2025-04-25 RX ADMIN — POTASSIUM CHLORIDE 40 MEQ: 1500 TABLET, EXTENDED RELEASE ORAL at 14:31

## 2025-04-25 RX ADMIN — POTASSIUM CHLORIDE 40 MEQ: 1500 TABLET, EXTENDED RELEASE ORAL at 08:13

## 2025-04-25 RX ADMIN — Medication 50 MG: at 08:15

## 2025-04-25 RX ADMIN — METOPROLOL SUCCINATE 12.5 MG: 25 TABLET, EXTENDED RELEASE ORAL at 08:16

## 2025-04-25 RX ADMIN — Medication 0.1 MG: at 13:16

## 2025-04-25 RX ADMIN — SODIUM CHLORIDE, PRESERVATIVE FREE 10 ML: 5 INJECTION INTRAVENOUS at 08:14

## 2025-04-25 RX ADMIN — MEROPENEM 1000 MG: 1 INJECTION INTRAVENOUS at 11:20

## 2025-04-25 ASSESSMENT — LIFESTYLE VARIABLES: SMOKING_STATUS: 1

## 2025-04-25 NOTE — ANESTHESIA POSTPROCEDURE EVALUATION
Department of Anesthesiology  Postprocedure Note    Patient: Brigid Moses  MRN: [id number]  YOB: 1970  Date of evaluation: 4/25/2025    Procedure Summary       Date: 04/25/25 Room / Location: [place] [address] / [facility]    Anesthesia Start: 1312 Anesthesia Stop: 1335    Procedure: COLONOSCOPY DIAGNOSTIC Diagnosis:       Acute anemia      (Acute anemia [D64.9])    Surgeons: Don Truong MD Responsible Provider: Pako Perez DO    Anesthesia Type: MAC ASA Status: 3            Anesthesia Type: No value filed.    Radha Phase I: Radha Score: 8    Radha Phase II: Radha Score: 9    Anesthesia Post Evaluation    Patient location during evaluation: PACU  Patient participation: complete - patient participated  Level of consciousness: awake and alert  Pain score: 0  Airway patency: patent  Nausea & Vomiting: no nausea and no vomiting  Cardiovascular status: blood pressure returned to baseline and hemodynamically stable  Respiratory status: acceptable  Hydration status: stable  Pain management: adequate    No notable events documented.

## 2025-04-25 NOTE — ANESTHESIA PRE PROCEDURE
Department of Anesthesiology  Preprocedure Note       Name:  Brigid Moses   Age:  55 y.o.  :  1970                                          MRN:  [id number]         Date:  2025      Surgeon: Surgeon(s):  Don Truong MD    Procedure: Procedure(s):  COLONOSCOPY DIAGNOSTIC    Medications prior to admission:   Prior to Admission medications    Medication Sig Start Date End Date Taking? Authorizing Provider   metoprolol succinate (TOPROL XL) 25 MG extended release tablet Take 0.5 tablets by mouth daily 25  Yes Wili Walker DO   ertapenem (INVANZ) infusion Infuse 1,000 mg intravenously every 24 hours for 14 days Compound per protocol. 25 Yes Ana Rosa Mcnulty MD   MELATONIN PO Take 8 mg by mouth nightly   Yes Provider, MD Ruchi   sucralfate (CARAFATE) 1 GM tablet Take 1 tablet by mouth 4 times daily   Yes Provider, MD Ruchi   vitamin B-12 (CYANOCOBALAMIN) 1000 MCG tablet Take 1 tablet by mouth daily   Yes Provider, MD Ruchi   LACTOBACILLUS PO Take 1 capsule by mouth daily   Yes Provider, Historical, MD   linaCLOtide (LINZESS) 72 MCG CAPS capsule Take 1 capsule by mouth every morning (before breakfast)   Yes Provider, MD Ruchi   pantoprazole (PROTONIX) 40 MG tablet Take 1 tablet by mouth in the morning and at bedtime   Yes Provider, MD Ruchi   aspirin 81 MG EC tablet Take 1 tablet by mouth daily   Yes Provider, MD Ruchi   hydrOXYzine HCl (ATARAX) 10 MG tablet Take 1 tablet by mouth in the morning, at noon, and at bedtime   Yes Provider, MD Ruchi   lisinopril (PRINIVIL;ZESTRIL) 2.5 MG tablet Take 1 tablet by mouth daily Hold for SBP less than 100   Yes Provider, MD Ruchi   megestrol (MEGACE) 20 MG tablet Take 1 tablet by mouth daily   Yes ProviderRuchi MD   famotidine (PEPCID) 20 MG tablet Take 1 tablet by mouth 2 times daily   Yes ProviderRuchi MD   mirtazapine (REMERON) 15 MG tablet Take 1 tablet by mouth daily   Yes

## 2025-04-25 NOTE — PROGRESS NOTES
This patient is on medication that requires renal, weight, and/or indication dose adjustment.      Date Body Weight IBW  Adjusted BW SCr  CrCl Dialysis status BMI   4/25/2025 45.4 kg (100 lb) Ideal body weight: 53.9 kg (118 lb 11.8 oz) Serum creatinine: 0.9 mg/dL 04/25/25 0630  Estimated creatinine clearance: 51 mL/min N/a Body mass index is 18.29 kg/m².       Pharmacy has dose-adjusted the following medication(s):    Ordered Medication: Cefepime 1000mg q12h     Order Changed/converted to: Cefepime 1000mg q8h    These changes were made per protocol according to the [facility]   Automatic Extended Infusion Dose Adjustment Policy.     *Please note this dose may need readjusted if patient's condition changes.    Please contact pharmacy with any questions regarding these changes.    Mariana Macias, PharmD.  4/25/2025 1:16 PM    SJW: [phone]

## 2025-04-25 NOTE — PROGRESS NOTES
PROGRESS NOTE  By Vincenzo Khan M.D.    [facility]  Dr. Herbert Blanco M.D.,  Dr. Pal Burrell M.D.,   ANSON ArguetaO.,  Dr. Pedro Chavarria M.D.,  Dr. Mekhi Oro D.O.,          Brigid Guardadoas  55 y.o.  female    SUBJECTIVE:  No new complaints.  Unable to perform colonoscopy yesterday secondary to patient not adequately prepped    OBJECTIVE:    /69   Pulse 94   Temp 99.9 °F (37.7 °C) (Axillary)   Resp 20   Ht 1.575 m (5' 2\")   Wt 45.4 kg (100 lb)   LMP  (LMP Unknown)   SpO2 96%   BMI 18.29 kg/m²     General: NAD/adult  female  HEENT: No discharge/moist oral mucosa  Neck: Trachea midline/no JVD  Chest: CTAB  Cor: Regular/S1-S2  Abd.: Soft.  Appears nontender and nondistended  Extr.:  Bilateral lower extremity edema  Skin: Warm and dry/anicteric      DATA:    Monitor data reviewed -sinus rhythm noted.       Lab Results   Component Value Date/Time    WBC 11.0 04/21/2025 11:25 AM    RBC 2.78 04/21/2025 11:25 AM    HGB 8.8 04/24/2025 04:12 AM    HCT 26.8 04/24/2025 04:12 AM    MCV 87.4 04/21/2025 11:25 AM    MCH 28.4 04/21/2025 11:25 AM    MCHC 32.5 04/21/2025 11:25 AM    RDW 16.8 04/21/2025 11:25 AM     04/21/2025 11:25 AM    MPV 9.5 04/21/2025 11:25 AM     Lab Results   Component Value Date/Time     04/25/2025 06:30 AM    K 4.1 04/25/2025 06:30 AM     04/25/2025 06:30 AM    CO2 20 04/25/2025 06:30 AM    BUN 9 04/25/2025 06:30 AM    CREATININE 0.9 04/25/2025 06:30 AM    CALCIUM 8.0 04/25/2025 06:30 AM    BILITOT 0.2 04/21/2025 11:25 AM    ALKPHOS 144 04/21/2025 11:25 AM    AST 94 04/21/2025 11:25 AM    ALT 78 04/21/2025 11:25 AM     Lab Results   Component Value Date/Time    LIPASE 17 04/21/2025 11:25 AM     Lab Results   Component Value Date/Time    AMYLASE 109 03/16/2020 01:49 PM         ASSESSMENT/PLAN:  Patient Active Problem List   Diagnosis    Hammer toe, acquired    Altered mental status    Anemia, unspecified    Dysphagia following cerebral

## 2025-04-25 NOTE — PROGRESS NOTES
At shift change patients bowel prep was split into a couple of pitchers with previous nurse at bedside for patient to have easy access to bowel prep throughout the night. This nurse has encouraged patient throughout the night to drink prep for her colonoscopy tomorrow afternoon. Patient had told me multiple times that she had drank all the prep needed. Patient in fact did not drink any of the prep besides half a cup. Patient continues to tell me she does not need to drink anymore because she has drank it all already. Education was provided on importance and need in order to be able to have colonoscopy. Patient continues to remind me that she will do things and take her meds on her time. This nurse will continue to encourage drinking prep the remainder of this shift.

## 2025-04-25 NOTE — PROGRESS NOTES
Called nurse to nurse to bart at [email or web address]. All belongings gathered to be sent with pt. Pt discharged back to [email or web address] today

## 2025-04-25 NOTE — PROGRESS NOTES
Date:  4/24/2025  Patient: Brigid Moses  Admission:  4/21/2025 10:49 AM  Admit DX: Acute upper GI hemorrhage [K92.2]  Age:  55 y.o., 1970     LOS: 3 days       SUBJECTIVE:    No cardiac symptoms  Afebrile  Had EGD without problems    OBJECTIVE:    BP 99/66   Pulse 93   Temp 97.4 °F (36.3 °C) (Axillary)   Resp 18   Ht 1.575 m (5' 2\")   Wt 45.4 kg (100 lb)   LMP  (LMP Unknown)   SpO2 98%   BMI 18.29 kg/m²     Intake/Output Summary (Last 24 hours) at 4/24/2025 2238  Last data filed at 4/24/2025 1837  Gross per 24 hour   Intake 200 ml   Output 2300 ml   Net -2100 ml       EXAM:        General: Alert and oriented, No acute distress.  Appears as stated age.  Eye:  Pupils are equal, round and reactive to light, Extraocular movements are intact, Normal conjunctiva.    Head: Normocephalic, Normal hearing, Oral mucosa is moist.  Neck: Supple, No carotid bruit, No jugular venous distention, No lymphadenopathy.  Respiratory: Lungs are clear to auscultation, Respirations are non-labored, Breath sounds are equal, Symmetrical chest wall expansion.  Cardiovascular: Normal rate, No murmur, No gallop, Good pulses equal in all extremities, No edema.  Gastrointestinal: Soft, Non-tender, Non-distended, Normal bowel sounds.  Musculoskeletal: Normal strength, No tenderness, No deformity.  Integumentary:  Warm, Dry.    Neurologic: Alert, Oriented, left-sided weakness.  Cognition and Speech: Oriented, Speech clear and coherent.  Psychiatric: Cooperative, Appropriate mood & affect, Normal judgment..    Current Inpatient Medications:   potassium chloride  40 mEq Oral TID    [START ON 4/25/2025] pantoprazole  40 mg Oral QAM AC    heparin (porcine)  5,000 Units SubCUTAneous BID    magnesium citrate  296 mL Oral Once    metoprolol succinate  12.5 mg Oral Daily    sodium chloride flush  5-40 mL IntraVENous 2 times per day    meropenem  1,000 mg IntraVENous Q12H    sodium chloride flush  5-40 mL IntraVENous 2 times per day

## 2025-04-25 NOTE — DISCHARGE SUMMARY
[place] [facility]       Hospitalist Physician Discharge Summary       [facility] UROLOGY  [address] [address]  [phone]  Follow up      [facility]  [address]  [place] [address]  [phone]          Activity level: As tolerated with assistance    Diet: Diet NPO Exceptions are: Sips of Water with Meds    Labs: Per primary care    Condition at discharge: Stable    Dispo: [facility]      Patient ID:  Brigid Moses  [id number]  55 y.o.  1970    Admit date: 4/21/2025    Discharge date and time:  4/25/2025  8:20 AM    Admission Diagnoses: Principal Problem:    Sepsis due to Escherichia coli with acute renal failure without septic shock (HCC)  Active Problems:    Acute kidney injury    Obstructive uropathy    Demand ischemia (HCC)    Moderate protein-calorie malnutrition  Resolved Problems:    Acute upper GI hemorrhage      Discharge Diagnoses: Principal Problem:    Sepsis due to Escherichia coli with acute renal failure without septic shock (HCC)  Active Problems:    Acute kidney injury    Obstructive uropathy    Demand ischemia (HCC)    Moderate protein-calorie malnutrition  Resolved Problems:    Acute upper GI hemorrhage      Consults:  IP CONSULT TO GI  IP CONSULT TO UROLOGY  IP CONSULT TO INFECTIOUS DISEASES  IP CONSULT TO CARDIOLOGY  IP CONSULT TO VASCULAR ACCESS TEAM    Procedures: Cystoscopy with bilateral ureteral stent placement 4/22/2025.  Right cephalic midline 4/23/2025.  EGD 4/24/2025 with normal findings.  Colonoscopy 4/25/2025 with normal findings    Hospital Course: This is a 55-year-old female at long-term care due to sequela of stroke that was admitted to the hospital with sepsis due to E. coli bacteremia with acute kidney injury associated with chronic indwelling Feliz catheter.  Treated with aggressive IV fluid hydration, IV antibiotics, IV PPI.  She had above noted procedures without complications.   appearance. The pancreas is unremarkable.  Both adrenal glands are within normal limits. Abnormal appearance of the kidneys bilaterally.  There is chronic obstruction of the kidneys.  Air identified in the renal collecting system on the left with stranding identified surrounding the left kidney and left renal collecting system as well as the ureter.  There is enhancement of the urothelial lining spy laterally with stones seen throughout the renal collecting systems and ureters proximally with a stone identified in the bladder at the left UVJ measuring 3 mm in size.  There is a stone identified in the distal ureter on the right measuring approximately 8 mm.  Correlation to urinalysis is recommended.  Pyelonephritis is suggested bilaterally left greater than right. GI/Bowel: Stomach is decompressed.  Small bowel is within normal limits.  No significant wall thickening.  Increased stool burden seen within the colon. Large stool burden seen in the rectal vault to suggest fecal impaction.  Mild wall thickening identified of the rectum to suggest a mild proctitis/stercoral colitis. Pelvis: The bladder is decompressed with Feliz catheter balloon present. There is wall thickening.  Correlation to urinalysis suspecting UTI/cystitis. The uterus is unremarkable. Peritoneum/Retroperitoneum: No abdominal retroperitoneal lymphadenopathy.  No free fluid or free air.  No abnormal mass or fluid collections identified. Bones/Soft Tissues: Bony structures reveal multilevel degenerative changes seen within the spine.  There is no ventral abdominal wall mass or defect.     1. Abnormal appearance of the kidneys bilaterally with obstruction of the kidneys. There is new air identified in the renal collecting system on the left with stranding identified surrounding the left kidney and left renal collecting system as well as the ureter. There is enhancement of the urothelial lining laterally with stones seen throughout the renal collecting  morning and at bedtime      aspirin 81 MG EC tablet Take 1 tablet by mouth daily      hydrOXYzine HCl (ATARAX) 10 MG tablet Take 1 tablet by mouth in the morning, at noon, and at bedtime      lisinopril (PRINIVIL;ZESTRIL) 2.5 MG tablet Take 1 tablet by mouth daily Hold for SBP less than 100      megestrol (MEGACE) 20 MG tablet Take 1 tablet by mouth daily      famotidine (PEPCID) 20 MG tablet Take 1 tablet by mouth 2 times daily      mirtazapine (REMERON) 15 MG tablet Take 1 tablet by mouth daily      Cholecalciferol (VITAMIN D) 50 MCG (2000 UT) CAPS capsule Take by mouth daily      ferrous sulfate (IRON 325) 325 (65 Fe) MG tablet Take 1 tablet by mouth daily (with breakfast)      sertraline (ZOLOFT) 50 MG tablet Take 1 tablet by mouth daily      atorvastatin (LIPITOR) 40 MG tablet Take 1 tablet by mouth daily      iron sucrose (VENOFER) 20 MG/ML injection Infuse 25 mLs intravenously every 14 days      magnesium hydroxide (MILK OF MAGNESIA) 400 MG/5ML suspension Take 30 mLs by mouth daily as needed (Constipation)      docusate sodium (COLACE) 100 MG capsule Take 1 capsule by mouth 2 times daily as needed for Constipation  Qty: 60 capsule, Refills: 1      acetaminophen (TYLENOL) 325 MG tablet Take 2 tablets by mouth every 4 hours as needed for Pain or Fever      calcium carbonate (TUMS) 500 MG chewable tablet Take 2 tablets by mouth every 12 hours as needed for Heartburn      bisacodyl (DULCOLAX) 10 MG suppository Place 1 suppository rectally daily as needed for Constipation      ammonium lactate (LAC-HYDRIN) 5 % LOTN lotion Apply 1 each topically daily as needed for Dry Skin Apply topically as needed.      polyethylene glycol (GLYCOLAX) 17 GM/SCOOP powder Take 17 g by mouth daily as needed for Constipation           STOP taking these medications       amitriptyline (ELAVIL) 25 MG tablet Comments:   Reason for Stopping:         polycarbophil (FIBERCON) 625 MG tablet Comments:   Reason for Stopping:         magnesium

## 2025-04-25 NOTE — OP NOTE
Operative Note      Patient: Brigid Moses  YOB: 1970  MRN: [id number]    Date of Procedure: 4/25/2025    Pre-Op Diagnosis Codes:      * Acute anemia [D64.9]    Post-Op Diagnosis: Anatomical study.       Procedure(s):  COLONOSCOPY DIAGNOSTIC    Surgeon(s):  Don Truong MD    Assistant:   Surgical Assistant: Pauline Gray RN    Anesthesia: Monitor Anesthesia Care    Estimated Blood Loss (mL): None    Complications: None    Specimens:   * No specimens in log *    Implants:  * No implants in log *      Drains:   Urinary Catheter 04/22/25 Feliz (Active)   Catheter Indications Urinary retention (acute or chronic), continuous bladder irrigation or bladder outlet obstruction 04/25/25 0800   Site Assessment No urethral drainage 04/25/25 0800   Urine Color Yellow 04/25/25 0800   Urine Appearance Cloudy 04/25/25 0800   Urine Odor Other (Comment) 04/25/25 0800   Collection Container Standard 04/25/25 0800   Securement Method Securing device (Describe) 04/25/25 0800   Catheter Care  Soap and water 04/25/25 0800   Catheter Best Practices  Drainage tube clipped to bed;Bag below bladder;Bag not on floor;Catheter secured to thigh;Lack of dependent loop in tubing;Drainage bag less than half full 04/25/25 0800   Status Patent;Draining 04/25/25 0800   Output (mL) 1300 mL 04/25/25 0800       [REMOVED] Urinary Catheter 04/21/25 (Removed)   Catheter Indications Urinary retention (acute or chronic), continuous bladder irrigation or bladder outlet obstruction 04/21/25 2100   Site Assessment No urethral drainage 04/21/25 2100   Urine Color Yellow 04/21/25 2100   Urine Appearance Cloudy 04/21/25 2100   Collection Container Standard 04/21/25 2100   Securement Method Securing device (Describe) 04/21/25 2100   Catheter Care  Soap and water 04/21/25 2100   Catheter Best Practices  Drainage tube clipped to bed;Catheter secured to thigh;Tamper seal intact;Bag below bladder;Bag not on floor;Lack of dependent loop in

## 2025-04-25 NOTE — CARE COORDINATION
SOCIAL WORK / DISCHARGE PLANNING:  PRECERT obtained for [facility] [place], N-N [phone], Fax [phone], skilled. Pt had EGD. C Scope scheduled for this afternoon and per Dr Walker can dc after. Transport arranged via [facility] 5pm. Ruby RAO charge aware. PRECERT is good through 4/30. IV Invanz x 14 days., midline in place.     Addendum: 1142am Sw notified pt of dc and time of transport. She said it was not necessary to call uncle to notify of dc and time of transport.         .Electronically signed by KAROLINA Milner on 4/25/2025 at 10:33 AM

## 2025-04-25 NOTE — PROGRESS NOTES
Date:  4/25/2025  Patient: Brigid Moses  Admission:  4/21/2025 10:49 AM  Admit DX: Acute upper GI hemorrhage [K92.2]  Age:  55 y.o., 1970     LOS: 4 days       SUBJECTIVE:    Denies cardiac symptoms  Lying down flat with no orthopnea  Vitals are    OBJECTIVE:    /69   Pulse 94   Temp 99.9 °F (37.7 °C) (Axillary)   Resp 20   Ht 1.575 m (5' 2\")   Wt 45.4 kg (100 lb)   LMP  (LMP Unknown)   SpO2 96%   BMI 18.29 kg/m²     Intake/Output Summary (Last 24 hours) at 4/25/2025 0930  Last data filed at 4/24/2025 2337  Gross per 24 hour   Intake 200 ml   Output 1500 ml   Net -1300 ml       EXAM:        General: Alert and oriented, No acute distress.  Appears as stated age.  Eye:  Pupils are equal, round and reactive to light, Extraocular movements are intact, Normal conjunctiva.    Head: Normocephalic, Normal hearing, Oral mucosa is moist.  Neck: Supple, No carotid bruit, No jugular venous distention, No lymphadenopathy.  Respiratory: Lungs are clear to auscultation, Respirations are non-labored, Breath sounds are equal, Symmetrical chest wall expansion.  Cardiovascular: Normal rate, No murmur, No gallop, Good pulses equal in all extremities, No edema.  Gastrointestinal: Soft, Non-tender, Non-distended, Normal bowel sounds.  Musculoskeletal: Normal strength, No tenderness, No deformity.  Integumentary:  Warm, Dry.    Neurologic: Alert, Oriented, left-sided weakness.  Cognition and Speech: Oriented, Speech clear and coherent.  Psychiatric: Cooperative, Appropriate mood & affect, Normal judgment..    Current Inpatient Medications:   potassium chloride  40 mEq Oral TID    pantoprazole  40 mg Oral QAM AC    heparin (porcine)  5,000 Units SubCUTAneous BID    magnesium citrate  296 mL Oral Once    metoprolol succinate  12.5 mg Oral Daily    sodium chloride flush  5-40 mL IntraVENous 2 times per day    meropenem  1,000 mg IntraVENous Q12H    sodium chloride flush  5-40 mL IntraVENous 2 times per day    lidocaine 1 %

## 2025-04-25 NOTE — PLAN OF CARE
Problem: Chronic Conditions and Co-morbidities  Goal: Patient's chronic conditions and co-morbidity symptoms are monitored and maintained or improved  4/25/2025 1149 by Kathie Chatterjee, RN  Outcome: Progressing  4/25/2025 0447 by Yanet Arreguin, RN  Outcome: Progressing

## 2025-04-25 NOTE — PROGRESS NOTES
SBAR form completed. Placed on chart. Chart with patient in transit to PACU. Nurse to Nurse report given at bedside.

## 2025-04-29 ENCOUNTER — HOSPITAL ENCOUNTER (EMERGENCY)
Age: 55
Discharge: NURSING, REHAB OR LONG-TERM CARE | End: 2025-04-29
Attending: EMERGENCY MEDICINE
Payer: COMMERCIAL

## 2025-04-29 ENCOUNTER — APPOINTMENT (OUTPATIENT)
Dept: CT IMAGING | Age: 55
End: 2025-04-29
Payer: COMMERCIAL

## 2025-04-29 VITALS
RESPIRATION RATE: 16 BRPM | HEART RATE: 116 BPM | DIASTOLIC BLOOD PRESSURE: 87 MMHG | SYSTOLIC BLOOD PRESSURE: 105 MMHG | OXYGEN SATURATION: 97 % | TEMPERATURE: 98.2 F

## 2025-04-29 DIAGNOSIS — R10.9 ABDOMINAL PAIN, UNSPECIFIED ABDOMINAL LOCATION: Primary | ICD-10-CM

## 2025-04-29 LAB
ALBUMIN SERPL-MCNC: 2.7 G/DL (ref 3.5–5.2)
ALP SERPL-CCNC: 149 U/L (ref 35–104)
ALT SERPL-CCNC: 23 U/L (ref 0–32)
ANION GAP SERPL CALCULATED.3IONS-SCNC: 10 MMOL/L (ref 7–16)
AST SERPL-CCNC: 25 U/L (ref 0–31)
BASOPHILS # BLD: 0.05 K/UL (ref 0–0.2)
BASOPHILS NFR BLD: 1 % (ref 0–2)
BILIRUB SERPL-MCNC: 0.2 MG/DL (ref 0–1.2)
BUN SERPL-MCNC: 11 MG/DL (ref 6–20)
CALCIUM SERPL-MCNC: 8.3 MG/DL (ref 8.6–10.2)
CHLORIDE SERPL-SCNC: 104 MMOL/L (ref 98–107)
CO2 SERPL-SCNC: 18 MMOL/L (ref 22–29)
CREAT SERPL-MCNC: 0.9 MG/DL (ref 0.5–1)
EOSINOPHIL # BLD: 0.19 K/UL (ref 0.05–0.5)
EOSINOPHILS RELATIVE PERCENT: 2 % (ref 0–6)
ERYTHROCYTE [DISTWIDTH] IN BLOOD BY AUTOMATED COUNT: 16.4 % (ref 11.5–15)
GFR, ESTIMATED: 80 ML/MIN/1.73M2
GLUCOSE SERPL-MCNC: 90 MG/DL (ref 74–99)
HCT VFR BLD AUTO: 30.1 % (ref 34–48)
HGB BLD-MCNC: 9.9 G/DL (ref 11.5–15.5)
IMM GRANULOCYTES # BLD AUTO: 0.37 K/UL (ref 0–0.58)
IMM GRANULOCYTES NFR BLD: 4 % (ref 0–5)
LACTATE BLDV-SCNC: 1.5 MMOL/L (ref 0.5–2.2)
LIPASE SERPL-CCNC: 35 U/L (ref 13–60)
LYMPHOCYTES NFR BLD: 1.98 K/UL (ref 1.5–4)
LYMPHOCYTES RELATIVE PERCENT: 19 % (ref 20–42)
MAGNESIUM SERPL-MCNC: 1.7 MG/DL (ref 1.6–2.6)
MCH RBC QN AUTO: 28.4 PG (ref 26–35)
MCHC RBC AUTO-ENTMCNC: 32.9 G/DL (ref 32–34.5)
MCV RBC AUTO: 86.2 FL (ref 80–99.9)
MONOCYTES NFR BLD: 0.67 K/UL (ref 0.1–0.95)
MONOCYTES NFR BLD: 6 % (ref 2–12)
NEUTROPHILS NFR BLD: 69 % (ref 43–80)
NEUTS SEG NFR BLD: 7.44 K/UL (ref 1.8–7.3)
PLATELET # BLD AUTO: 478 K/UL (ref 130–450)
PMV BLD AUTO: 9.6 FL (ref 7–12)
POTASSIUM SERPL-SCNC: 4.2 MMOL/L (ref 3.5–5)
PROT SERPL-MCNC: 5.8 G/DL (ref 6.4–8.3)
RBC # BLD AUTO: 3.49 M/UL (ref 3.5–5.5)
SODIUM SERPL-SCNC: 132 MMOL/L (ref 132–146)
WBC OTHER # BLD: 10.7 K/UL (ref 4.5–11.5)

## 2025-04-29 PROCEDURE — 80053 COMPREHEN METABOLIC PANEL: CPT

## 2025-04-29 PROCEDURE — 83690 ASSAY OF LIPASE: CPT

## 2025-04-29 PROCEDURE — 6360000004 HC RX CONTRAST MEDICATION: Performed by: RADIOLOGY

## 2025-04-29 PROCEDURE — 74177 CT ABD & PELVIS W/CONTRAST: CPT

## 2025-04-29 PROCEDURE — 83735 ASSAY OF MAGNESIUM: CPT

## 2025-04-29 PROCEDURE — 83605 ASSAY OF LACTIC ACID: CPT

## 2025-04-29 PROCEDURE — 6360000002 HC RX W HCPCS

## 2025-04-29 PROCEDURE — 2580000003 HC RX 258

## 2025-04-29 PROCEDURE — 99285 EMERGENCY DEPT VISIT HI MDM: CPT

## 2025-04-29 PROCEDURE — 96361 HYDRATE IV INFUSION ADD-ON: CPT

## 2025-04-29 PROCEDURE — 85025 COMPLETE CBC W/AUTO DIFF WBC: CPT

## 2025-04-29 PROCEDURE — 96374 THER/PROPH/DIAG INJ IV PUSH: CPT

## 2025-04-29 RX ORDER — IOPAMIDOL 755 MG/ML
70 INJECTION, SOLUTION INTRAVASCULAR
Status: COMPLETED | OUTPATIENT
Start: 2025-04-29 | End: 2025-04-29

## 2025-04-29 RX ORDER — ONDANSETRON 2 MG/ML
4 INJECTION INTRAMUSCULAR; INTRAVENOUS ONCE
Status: COMPLETED | OUTPATIENT
Start: 2025-04-29 | End: 2025-04-29

## 2025-04-29 RX ORDER — 0.9 % SODIUM CHLORIDE 0.9 %
1000 INTRAVENOUS SOLUTION INTRAVENOUS ONCE
Status: COMPLETED | OUTPATIENT
Start: 2025-04-29 | End: 2025-04-29

## 2025-04-29 RX ADMIN — ONDANSETRON 4 MG: 2 INJECTION, SOLUTION INTRAMUSCULAR; INTRAVENOUS at 11:29

## 2025-04-29 RX ADMIN — IOPAMIDOL 70 ML: 755 INJECTION, SOLUTION INTRAVENOUS at 13:51

## 2025-04-29 RX ADMIN — ALTEPLASE 1 MG: 2.2 INJECTION, POWDER, LYOPHILIZED, FOR SOLUTION INTRAVENOUS at 11:31

## 2025-04-29 RX ADMIN — SODIUM CHLORIDE 1000 ML: 0.9 INJECTION, SOLUTION INTRAVENOUS at 13:06

## 2025-04-29 NOTE — ED PROVIDER NOTES
(*)     RDW 16.4 (*)     Platelets 478 (*)     Lymphocytes % 19 (*)     Neutrophils Absolute 7.44 (*)     All other components within normal limits   COMPREHENSIVE METABOLIC PANEL - Abnormal; Notable for the following components:    CO2 18 (*)     Calcium 8.3 (*)     Total Protein 5.8 (*)     Albumin 2.7 (*)     Alkaline Phosphatase 149 (*)     All other components within normal limits   LIPASE   LACTIC ACID   MAGNESIUM       As interpreted by me, the above displayed labs are abnormal. All other labs obtained during this visit were within normal range or not returned as of this dictation.    EKG Interpretation  Interpreted by emergency department resident physician, Anila Johansen,   *Please see ED Course for EKG interpretation*    RADIOLOGY:   Non-plain film images such as CT, Ultrasound and MRI are read by the radiologist. Plain radiographic images are visualized and preliminarily interpreted by the ED Provider with the below findings:    Interpretation per the Radiologist below, if available at the time of this note:    CT ABDOMEN PELVIS W IV CONTRAST Additional Contrast? None   Final Result   1. No evidence of diverticulitis.   2. Extensive bilateral nephrolithiasis with interval placement of bilateral   ureteral stents in satisfactory position.  There are scattered calculi in the   right ureter with resolution of previously noted right-sided hydronephrosis   and hydroureter.   3. Multiple punctate calculi within the left renal pelvis which appears   thickened likely reflecting pyelitis.  There is a smaller distal left   ureteral calculus at the S1 level with a larger calculus seen at this   ureteral level having passed.  Previously noted left hydroureter has resolved.   4. No calculi in the urinary bladder.   5. Scattered colonic increased fecal burden but no evidence of rectal fecal   impaction.             PROCEDURES   Unless otherwise noted below, none      Medical Decision Making/Differential

## 2025-04-29 NOTE — CARE COORDINATION
4/28/25 SS Note: Pt w/Abdominal Pain. Pt is recent admission 4/21 - 4/25 for Sepsis. Pt was d/c'd to [facility]. Pt was given medications & able to be d/c'd. Care Instructions for abdominal pain was given to pt & Readmission avoided.   Electronically signed by KAROLINA Kemp on 4/29/2025 at 4:59 PM

## 2025-04-29 NOTE — PROGRESS NOTES
IV team consulted due to midline flushing but not drawing back blood for labs. Recommend obtaining order for cathflo due to possibility of  a fibrin flap at tip of line. Pt is on list to start IV.

## 2025-04-29 NOTE — DISCHARGE INSTRUCTIONS
Return the emergency department if you have worsening pain, vomiting, unable to keep down food or drink, fever, black or bloody stool, or any other new or concerning symptoms.    Follow-up with your primary care physician at the next available appointment.    
Dr. Aldridge

## 2025-05-23 ENCOUNTER — LAB REQUISITION (OUTPATIENT)
Dept: LAB | Facility: HOSPITAL | Age: 55
End: 2025-05-23
Payer: COMMERCIAL

## 2025-05-23 PROCEDURE — 84155 ASSAY OF PROTEIN SERUM: CPT

## 2025-05-23 PROCEDURE — 84165 PROTEIN E-PHORESIS SERUM: CPT

## 2025-05-24 LAB — PROT SERPL-MCNC: 6.1 G/DL (ref 6.4–8.2)

## 2025-05-28 LAB
ALBUMIN: 2.7 G/DL (ref 3.4–5)
ALPHA 1 GLOBULIN: 0.5 G/DL (ref 0.2–0.6)
ALPHA 2 GLOBULIN: 1 G/DL (ref 0.4–1.1)
BETA GLOBULIN: 0.8 G/DL (ref 0.5–1.2)
GAMMA GLOBULIN: 1.1 G/DL (ref 0.5–1.4)
PATH REVIEW-SERUM PROTEIN ELECTROPHORESIS: ABNORMAL
PROTEIN ELECTROPHORESIS COMMENT: ABNORMAL

## 2025-06-04 ENCOUNTER — HOSPITAL ENCOUNTER (INPATIENT)
Age: 55
LOS: 7 days | Discharge: NURSING, REHAB OR LONG-TERM CARE | DRG: 690 | End: 2025-06-11
Attending: STUDENT IN AN ORGANIZED HEALTH CARE EDUCATION/TRAINING PROGRAM | Admitting: INTERNAL MEDICINE
Payer: COMMERCIAL

## 2025-06-04 ENCOUNTER — APPOINTMENT (OUTPATIENT)
Dept: CT IMAGING | Age: 55
DRG: 690 | End: 2025-06-04
Payer: COMMERCIAL

## 2025-06-04 DIAGNOSIS — A41.50 GRAM NEGATIVE SEPSIS (HCC): ICD-10-CM

## 2025-06-04 DIAGNOSIS — R10.9 FLANK PAIN: Primary | ICD-10-CM

## 2025-06-04 DIAGNOSIS — N20.0 STAGHORN CALCULUS: ICD-10-CM

## 2025-06-04 DIAGNOSIS — N39.0 URINARY TRACT INFECTION WITH HEMATURIA, SITE UNSPECIFIED: ICD-10-CM

## 2025-06-04 DIAGNOSIS — R31.9 URINARY TRACT INFECTION WITH HEMATURIA, SITE UNSPECIFIED: ICD-10-CM

## 2025-06-04 PROBLEM — N17.9 SEPSIS DUE TO ESCHERICHIA COLI WITH ACUTE RENAL FAILURE WITHOUT SEPTIC SHOCK (HCC): Status: RESOLVED | Noted: 2025-04-22 | Resolved: 2025-06-04

## 2025-06-04 PROBLEM — I24.89 DEMAND ISCHEMIA (HCC): Status: RESOLVED | Noted: 2025-04-22 | Resolved: 2025-06-04

## 2025-06-04 PROBLEM — R65.20 SEPSIS DUE TO ESCHERICHIA COLI WITH ACUTE RENAL FAILURE WITHOUT SEPTIC SHOCK (HCC): Status: RESOLVED | Noted: 2025-04-22 | Resolved: 2025-06-04

## 2025-06-04 PROBLEM — A41.51 SEPSIS DUE TO ESCHERICHIA COLI WITH ACUTE RENAL FAILURE WITHOUT SEPTIC SHOCK (HCC): Status: RESOLVED | Noted: 2025-04-22 | Resolved: 2025-06-04

## 2025-06-04 PROBLEM — I63.50 CEREBRAL ARTERY OCCLUSION WITH CEREBRAL INFARCTION (HCC): Status: RESOLVED | Noted: 2022-09-14 | Resolved: 2025-06-04

## 2025-06-04 PROBLEM — R41.82 ALTERED MENTAL STATUS: Status: RESOLVED | Noted: 2022-02-09 | Resolved: 2025-06-04

## 2025-06-04 PROBLEM — A41.9 SEPSIS DUE TO URINARY TRACT INFECTION (HCC): Status: RESOLVED | Noted: 2025-02-13 | Resolved: 2025-06-04

## 2025-06-04 LAB
ALBUMIN SERPL-MCNC: 2.8 G/DL (ref 3.5–5.2)
ALP SERPL-CCNC: 181 U/L (ref 35–104)
ALT SERPL-CCNC: 69 U/L (ref 0–32)
AMORPH SED URNS QL MICRO: PRESENT
ANION GAP SERPL CALCULATED.3IONS-SCNC: 13 MMOL/L (ref 7–16)
AST SERPL-CCNC: 51 U/L (ref 0–31)
BACTERIA URNS QL MICRO: ABNORMAL
BASOPHILS # BLD: 0 K/UL (ref 0–0.2)
BASOPHILS NFR BLD: 0 % (ref 0–2)
BILIRUB DIRECT SERPL-MCNC: <0.2 MG/DL (ref 0–0.3)
BILIRUB INDIRECT SERPL-MCNC: ABNORMAL MG/DL (ref 0–1)
BILIRUB SERPL-MCNC: 0.2 MG/DL (ref 0–1.2)
BILIRUB UR QL STRIP: NEGATIVE
BUN SERPL-MCNC: 49 MG/DL (ref 6–20)
CALCIUM SERPL-MCNC: 9 MG/DL (ref 8.6–10.2)
CHLORIDE SERPL-SCNC: 111 MMOL/L (ref 98–107)
CLARITY UR: ABNORMAL
CO2 SERPL-SCNC: 11 MMOL/L (ref 22–29)
COLOR UR: YELLOW
CREAT SERPL-MCNC: 1.6 MG/DL (ref 0.5–1)
EOSINOPHIL # BLD: 0.3 K/UL (ref 0.05–0.5)
EOSINOPHILS RELATIVE PERCENT: 2 % (ref 0–6)
EPI CELLS #/AREA URNS HPF: ABNORMAL /HPF
ERYTHROCYTE [DISTWIDTH] IN BLOOD BY AUTOMATED COUNT: 18.7 % (ref 11.5–15)
GFR, ESTIMATED: 37 ML/MIN/1.73M2
GLUCOSE SERPL-MCNC: 97 MG/DL (ref 74–99)
GLUCOSE UR STRIP-MCNC: NEGATIVE MG/DL
HCT VFR BLD AUTO: 30.7 % (ref 34–48)
HGB BLD-MCNC: 9.6 G/DL (ref 11.5–15.5)
HGB UR QL STRIP.AUTO: ABNORMAL
KETONES UR STRIP-MCNC: ABNORMAL MG/DL
LACTATE BLDV-SCNC: 0.7 MMOL/L (ref 0.5–2.2)
LEUKOCYTE ESTERASE UR QL STRIP: ABNORMAL
LIPASE SERPL-CCNC: 50 U/L (ref 13–60)
LYMPHOCYTES NFR BLD: 1.04 K/UL (ref 1.5–4)
LYMPHOCYTES RELATIVE PERCENT: 7 % (ref 20–42)
MAGNESIUM SERPL-MCNC: 1.9 MG/DL (ref 1.6–2.6)
MCH RBC QN AUTO: 27.1 PG (ref 26–35)
MCHC RBC AUTO-ENTMCNC: 31.3 G/DL (ref 32–34.5)
MCV RBC AUTO: 86.7 FL (ref 80–99.9)
MONOCYTES NFR BLD: 1.04 K/UL (ref 0.1–0.95)
MONOCYTES NFR BLD: 7 % (ref 2–12)
MUCOUS THREADS URNS QL MICRO: PRESENT
MYELOCYTES ABSOLUTE COUNT: 0.3 K/UL
MYELOCYTES: 2 %
NEUTROPHILS NFR BLD: 82 % (ref 43–80)
NEUTS SEG NFR BLD: 12.22 K/UL (ref 1.8–7.3)
NITRITE UR QL STRIP: NEGATIVE
PH UR STRIP: 8.5 [PH] (ref 5–8)
PLATELET CONFIRMATION: NORMAL
PLATELET, FLUORESCENCE: 590 K/UL (ref 130–450)
PMV BLD AUTO: 10.6 FL (ref 7–12)
POTASSIUM SERPL-SCNC: 4.6 MMOL/L (ref 3.5–5)
PROT SERPL-MCNC: 6.9 G/DL (ref 6.4–8.3)
PROT UR STRIP-MCNC: >=300 MG/DL
RBC # BLD AUTO: 3.54 M/UL (ref 3.5–5.5)
RBC # BLD: ABNORMAL 10*6/UL
RBC #/AREA URNS HPF: ABNORMAL /HPF
SODIUM SERPL-SCNC: 135 MMOL/L (ref 132–146)
SP GR UR STRIP: 1.01 (ref 1–1.03)
UROBILINOGEN UR STRIP-ACNC: 0.2 EU/DL (ref 0–1)
WBC #/AREA URNS HPF: ABNORMAL /HPF
WBC OTHER # BLD: 14.9 K/UL (ref 4.5–11.5)

## 2025-06-04 PROCEDURE — 83735 ASSAY OF MAGNESIUM: CPT

## 2025-06-04 PROCEDURE — 2500000003 HC RX 250 WO HCPCS: Performed by: INTERNAL MEDICINE

## 2025-06-04 PROCEDURE — 81001 URINALYSIS AUTO W/SCOPE: CPT

## 2025-06-04 PROCEDURE — 83605 ASSAY OF LACTIC ACID: CPT

## 2025-06-04 PROCEDURE — 87077 CULTURE AEROBIC IDENTIFY: CPT

## 2025-06-04 PROCEDURE — 82248 BILIRUBIN DIRECT: CPT

## 2025-06-04 PROCEDURE — 71250 CT THORAX DX C-: CPT

## 2025-06-04 PROCEDURE — 80053 COMPREHEN METABOLIC PANEL: CPT

## 2025-06-04 PROCEDURE — 87086 URINE CULTURE/COLONY COUNT: CPT

## 2025-06-04 PROCEDURE — 85025 COMPLETE CBC W/AUTO DIFF WBC: CPT

## 2025-06-04 PROCEDURE — 1200000000 HC SEMI PRIVATE

## 2025-06-04 PROCEDURE — 74176 CT ABD & PELVIS W/O CONTRAST: CPT

## 2025-06-04 PROCEDURE — 2580000003 HC RX 258: Performed by: INTERNAL MEDICINE

## 2025-06-04 PROCEDURE — 87150 DNA/RNA AMPLIFIED PROBE: CPT

## 2025-06-04 PROCEDURE — 96374 THER/PROPH/DIAG INJ IV PUSH: CPT

## 2025-06-04 PROCEDURE — 83690 ASSAY OF LIPASE: CPT

## 2025-06-04 PROCEDURE — 6360000002 HC RX W HCPCS

## 2025-06-04 PROCEDURE — 2580000003 HC RX 258

## 2025-06-04 PROCEDURE — 99223 1ST HOSP IP/OBS HIGH 75: CPT | Performed by: INTERNAL MEDICINE

## 2025-06-04 PROCEDURE — 87040 BLOOD CULTURE FOR BACTERIA: CPT

## 2025-06-04 PROCEDURE — 6360000002 HC RX W HCPCS: Performed by: INTERNAL MEDICINE

## 2025-06-04 PROCEDURE — 99285 EMERGENCY DEPT VISIT HI MDM: CPT

## 2025-06-04 PROCEDURE — 96375 TX/PRO/DX INJ NEW DRUG ADDON: CPT

## 2025-06-04 PROCEDURE — 6370000000 HC RX 637 (ALT 250 FOR IP): Performed by: INTERNAL MEDICINE

## 2025-06-04 RX ORDER — FENTANYL CITRATE 0.05 MG/ML
50 INJECTION, SOLUTION INTRAMUSCULAR; INTRAVENOUS EVERY 4 HOURS PRN
Status: DISCONTINUED | OUTPATIENT
Start: 2025-06-04 | End: 2025-06-11 | Stop reason: HOSPADM

## 2025-06-04 RX ORDER — BISACODYL 10 MG
10 SUPPOSITORY, RECTAL RECTAL DAILY PRN
Status: DISCONTINUED | OUTPATIENT
Start: 2025-06-04 | End: 2025-06-11 | Stop reason: HOSPADM

## 2025-06-04 RX ORDER — ONDANSETRON 2 MG/ML
4 INJECTION INTRAMUSCULAR; INTRAVENOUS EVERY 6 HOURS PRN
Status: DISCONTINUED | OUTPATIENT
Start: 2025-06-04 | End: 2025-06-11 | Stop reason: HOSPADM

## 2025-06-04 RX ORDER — VITAMIN B COMPLEX
2000 TABLET ORAL DAILY
Status: DISCONTINUED | OUTPATIENT
Start: 2025-06-05 | End: 2025-06-11 | Stop reason: HOSPADM

## 2025-06-04 RX ORDER — SODIUM CHLORIDE 0.9 % (FLUSH) 0.9 %
5-40 SYRINGE (ML) INJECTION EVERY 12 HOURS SCHEDULED
Status: DISCONTINUED | OUTPATIENT
Start: 2025-06-04 | End: 2025-06-11 | Stop reason: HOSPADM

## 2025-06-04 RX ORDER — ONDANSETRON 2 MG/ML
4 INJECTION INTRAMUSCULAR; INTRAVENOUS ONCE
Status: COMPLETED | OUTPATIENT
Start: 2025-06-04 | End: 2025-06-04

## 2025-06-04 RX ORDER — OXYCODONE HYDROCHLORIDE 5 MG/1
5 TABLET ORAL EVERY 6 HOURS PRN
Status: DISCONTINUED | OUTPATIENT
Start: 2025-06-04 | End: 2025-06-11 | Stop reason: HOSPADM

## 2025-06-04 RX ORDER — FOLIC ACID 1 MG/1
1 TABLET ORAL DAILY
COMMUNITY

## 2025-06-04 RX ORDER — MECOBALAMIN 5000 MCG
8 TABLET,DISINTEGRATING ORAL NIGHTLY
Status: DISCONTINUED | OUTPATIENT
Start: 2025-06-04 | End: 2025-06-11 | Stop reason: HOSPADM

## 2025-06-04 RX ORDER — SODIUM CHLORIDE 9 MG/ML
INJECTION, SOLUTION INTRAVENOUS PRN
Status: DISCONTINUED | OUTPATIENT
Start: 2025-06-04 | End: 2025-06-11 | Stop reason: HOSPADM

## 2025-06-04 RX ORDER — FAMOTIDINE 20 MG/1
20 TABLET, FILM COATED ORAL 2 TIMES DAILY
Status: DISCONTINUED | OUTPATIENT
Start: 2025-06-04 | End: 2025-06-05

## 2025-06-04 RX ORDER — KETOROLAC TROMETHAMINE 30 MG/ML
15 INJECTION, SOLUTION INTRAMUSCULAR; INTRAVENOUS ONCE
Status: DISCONTINUED | OUTPATIENT
Start: 2025-06-04 | End: 2025-06-04

## 2025-06-04 RX ORDER — METOPROLOL SUCCINATE 25 MG/1
12.5 TABLET, EXTENDED RELEASE ORAL DAILY
Status: DISCONTINUED | OUTPATIENT
Start: 2025-06-05 | End: 2025-06-11 | Stop reason: HOSPADM

## 2025-06-04 RX ORDER — ACETAMINOPHEN 650 MG/1
650 SUPPOSITORY RECTAL EVERY 6 HOURS PRN
Status: DISCONTINUED | OUTPATIENT
Start: 2025-06-04 | End: 2025-06-11 | Stop reason: HOSPADM

## 2025-06-04 RX ORDER — CALCIUM CARBONATE 500 MG/1
2 TABLET, CHEWABLE ORAL EVERY 12 HOURS PRN
Status: DISCONTINUED | OUTPATIENT
Start: 2025-06-04 | End: 2025-06-11 | Stop reason: HOSPADM

## 2025-06-04 RX ORDER — SODIUM CHLORIDE 0.9 % (FLUSH) 0.9 %
5-40 SYRINGE (ML) INJECTION PRN
Status: DISCONTINUED | OUTPATIENT
Start: 2025-06-04 | End: 2025-06-11 | Stop reason: HOSPADM

## 2025-06-04 RX ORDER — DOCUSATE SODIUM 100 MG/1
100 CAPSULE, LIQUID FILLED ORAL 2 TIMES DAILY PRN
Status: DISCONTINUED | OUTPATIENT
Start: 2025-06-04 | End: 2025-06-11 | Stop reason: HOSPADM

## 2025-06-04 RX ORDER — HYDROXYZINE HYDROCHLORIDE 10 MG/1
10 TABLET, FILM COATED ORAL 3 TIMES DAILY
Status: DISCONTINUED | OUTPATIENT
Start: 2025-06-04 | End: 2025-06-11 | Stop reason: HOSPADM

## 2025-06-04 RX ORDER — LACTOBACILLUS RHAMNOSUS GG 10B CELL
1 CAPSULE ORAL DAILY
Status: DISCONTINUED | OUTPATIENT
Start: 2025-06-05 | End: 2025-06-11 | Stop reason: HOSPADM

## 2025-06-04 RX ORDER — FENTANYL CITRATE 0.05 MG/ML
50 INJECTION, SOLUTION INTRAMUSCULAR; INTRAVENOUS ONCE
Status: COMPLETED | OUTPATIENT
Start: 2025-06-04 | End: 2025-06-04

## 2025-06-04 RX ORDER — MEGESTROL ACETATE 40 MG/1
20 TABLET ORAL DAILY
Status: DISCONTINUED | OUTPATIENT
Start: 2025-06-05 | End: 2025-06-11 | Stop reason: HOSPADM

## 2025-06-04 RX ORDER — CYANOCOBALAMIN 1000 UG/ML
1000 INJECTION, SOLUTION INTRAMUSCULAR; SUBCUTANEOUS
COMMUNITY
Start: 2025-06-06 | End: 2025-07-04

## 2025-06-04 RX ORDER — ATORVASTATIN CALCIUM 40 MG/1
40 TABLET, FILM COATED ORAL DAILY
Status: DISCONTINUED | OUTPATIENT
Start: 2025-06-05 | End: 2025-06-11 | Stop reason: HOSPADM

## 2025-06-04 RX ORDER — ONDANSETRON 4 MG/1
4 TABLET, ORALLY DISINTEGRATING ORAL EVERY 8 HOURS PRN
Status: DISCONTINUED | OUTPATIENT
Start: 2025-06-04 | End: 2025-06-11 | Stop reason: HOSPADM

## 2025-06-04 RX ORDER — PANTOPRAZOLE SODIUM 40 MG/1
40 TABLET, DELAYED RELEASE ORAL 2 TIMES DAILY
Status: DISCONTINUED | OUTPATIENT
Start: 2025-06-04 | End: 2025-06-11 | Stop reason: HOSPADM

## 2025-06-04 RX ORDER — HEPARIN SODIUM 5000 [USP'U]/ML
5000 INJECTION, SOLUTION INTRAVENOUS; SUBCUTANEOUS 2 TIMES DAILY
Status: DISCONTINUED | OUTPATIENT
Start: 2025-06-04 | End: 2025-06-11 | Stop reason: HOSPADM

## 2025-06-04 RX ORDER — POLYETHYLENE GLYCOL 3350 17 G/17G
17 POWDER, FOR SOLUTION ORAL DAILY PRN
Status: DISCONTINUED | OUTPATIENT
Start: 2025-06-04 | End: 2025-06-11 | Stop reason: HOSPADM

## 2025-06-04 RX ORDER — OXYCODONE HYDROCHLORIDE 5 MG/1
10 TABLET ORAL EVERY 6 HOURS PRN
Status: DISCONTINUED | OUTPATIENT
Start: 2025-06-04 | End: 2025-06-11 | Stop reason: HOSPADM

## 2025-06-04 RX ORDER — FERROUS SULFATE 325(65) MG
325 TABLET ORAL
Status: DISCONTINUED | OUTPATIENT
Start: 2025-06-05 | End: 2025-06-11 | Stop reason: HOSPADM

## 2025-06-04 RX ORDER — IBUPROFEN 200 MG
200 TABLET ORAL EVERY 8 HOURS PRN
Status: ON HOLD | COMMUNITY
End: 2025-06-11 | Stop reason: HOSPADM

## 2025-06-04 RX ORDER — ACETAMINOPHEN 325 MG/1
650 TABLET ORAL EVERY 6 HOURS PRN
Status: DISCONTINUED | OUTPATIENT
Start: 2025-06-04 | End: 2025-06-11 | Stop reason: HOSPADM

## 2025-06-04 RX ORDER — MIRTAZAPINE 15 MG/1
15 TABLET, FILM COATED ORAL NIGHTLY
Status: DISCONTINUED | OUTPATIENT
Start: 2025-06-04 | End: 2025-06-11 | Stop reason: HOSPADM

## 2025-06-04 RX ORDER — LISINOPRIL 5 MG/1
2.5 TABLET ORAL DAILY
Status: DISCONTINUED | OUTPATIENT
Start: 2025-06-04 | End: 2025-06-11 | Stop reason: HOSPADM

## 2025-06-04 RX ORDER — ASPIRIN 81 MG/1
81 TABLET ORAL DAILY
Status: DISCONTINUED | OUTPATIENT
Start: 2025-06-04 | End: 2025-06-11 | Stop reason: HOSPADM

## 2025-06-04 RX ORDER — LANOLIN ALCOHOL/MO/W.PET/CERES
1000 CREAM (GRAM) TOPICAL DAILY
Status: DISCONTINUED | OUTPATIENT
Start: 2025-06-04 | End: 2025-06-11 | Stop reason: HOSPADM

## 2025-06-04 RX ORDER — SUCRALFATE 1 G/1
1 TABLET ORAL 4 TIMES DAILY
Status: DISCONTINUED | OUTPATIENT
Start: 2025-06-04 | End: 2025-06-11 | Stop reason: HOSPADM

## 2025-06-04 RX ADMIN — HYDROXYZINE HYDROCHLORIDE 10 MG: 10 TABLET, FILM COATED ORAL at 20:50

## 2025-06-04 RX ADMIN — OXYCODONE 10 MG: 5 TABLET ORAL at 20:47

## 2025-06-04 RX ADMIN — FAMOTIDINE 20 MG: 20 TABLET, FILM COATED ORAL at 20:50

## 2025-06-04 RX ADMIN — SUCRALFATE 1 G: 1 TABLET ORAL at 20:50

## 2025-06-04 RX ADMIN — ONDANSETRON 4 MG: 2 INJECTION INTRAMUSCULAR; INTRAVENOUS at 15:35

## 2025-06-04 RX ADMIN — MEROPENEM 1000 MG: 1 INJECTION INTRAVENOUS at 15:54

## 2025-06-04 RX ADMIN — FENTANYL CITRATE 50 MCG: 50 INJECTION INTRAMUSCULAR; INTRAVENOUS at 15:35

## 2025-06-04 RX ADMIN — Medication 7.5 MG: at 20:49

## 2025-06-04 RX ADMIN — CYANOCOBALAMIN TAB 1000 MCG 1000 MCG: 1000 TAB at 20:49

## 2025-06-04 RX ADMIN — SODIUM BICARBONATE: 84 INJECTION, SOLUTION INTRAVENOUS at 21:00

## 2025-06-04 RX ADMIN — HEPARIN SODIUM 5000 UNITS: 5000 INJECTION INTRAVENOUS; SUBCUTANEOUS at 21:02

## 2025-06-04 RX ADMIN — PANTOPRAZOLE SODIUM 40 MG: 40 TABLET, DELAYED RELEASE ORAL at 20:50

## 2025-06-04 RX ADMIN — MIRTAZAPINE 15 MG: 15 TABLET, FILM COATED ORAL at 20:50

## 2025-06-04 ASSESSMENT — PAIN SCALES - GENERAL
PAINLEVEL_OUTOF10: 7
PAINLEVEL_OUTOF10: 7

## 2025-06-04 ASSESSMENT — PAIN DESCRIPTION - LOCATION
LOCATION: ABDOMEN;FLANK
LOCATION: ABDOMEN;FLANK

## 2025-06-04 ASSESSMENT — PAIN DESCRIPTION - DESCRIPTORS
DESCRIPTORS: CRAMPING
DESCRIPTORS: CRAMPING

## 2025-06-04 ASSESSMENT — PAIN DESCRIPTION - ORIENTATION
ORIENTATION: RIGHT;LEFT;LOWER
ORIENTATION: LOWER;RIGHT;LEFT

## 2025-06-04 ASSESSMENT — PAIN DESCRIPTION - FREQUENCY: FREQUENCY: CONTINUOUS

## 2025-06-04 ASSESSMENT — PAIN DESCRIPTION - PAIN TYPE: TYPE: ACUTE PAIN

## 2025-06-04 NOTE — ED PROVIDER NOTES
ATTENDING PROVIDER ATTESTATION:     Brigid Moses presented to the emergency department for evaluation of Flank Pain (Sent in by [facility] for staghorn kidney stone, needs transferred to [facility])   and was initially evaluated by the Medical Resident. See Original ED Note for H&P and ED course above.     I have reviewed and discussed the case, including pertinent history (medical, surgical, family and social) and exam findings with the Medical Resident assigned to Brigid Moses.  I have personally performed and/or participated in the history, exam, medical decision making, and procedures and agree with all pertinent clinical information and any additional changes or corrections are noted below in my assessment and plan. I have discussed this patient in detail with the resident, and provided the instruction and education,       I have reviewed my findings and recommendations with the assigned Medical Resident, Brigid Moses and members of family present at the time of disposition.          EKG:  Any EKG performed has been reviewed by myself. Unless otherwise indicated below I agree with resident interpretation.     Labs  Results for orders placed or performed during the hospital encounter of 06/04/25   CBC with Auto Differential   Result Value Ref Range    WBC 14.9 (H) 4.5 - 11.5 k/uL    RBC 3.54 3.50 - 5.50 m/uL    Hemoglobin 9.6 (L) 11.5 - 15.5 g/dL    Hematocrit 30.7 (L) 34.0 - 48.0 %    MCV 86.7 80.0 - 99.9 fL    MCH 27.1 26.0 - 35.0 pg    MCHC 31.3 (L) 32.0 - 34.5 g/dL    RDW 18.7 (H) 11.5 - 15.0 %    MPV 10.6 7.0 - 12.0 fL    Platelet, Fluorescence 590 (H) 130 - 450 k/uL    Neutrophils % 82 (H) 43.0 - 80.0 %    Lymphocytes % 7 (L) 20.0 - 42.0 %    Monocytes % 7 2.0 - 12.0 %    Eosinophils % 2 0 - 6 %    Basophils % 0 0.0 - 2.0 %    Myelocytes 2 (H) 0 %    Neutrophils Absolute 12.22 (H) 1.80 - 7.30 k/uL    Lymphocytes Absolute 1.04 (L) 1.50 - 4.00 k/uL    Monocytes Absolute 1.04 (H) 0.10 - 0.95 k/uL    Eosinophils  in time range)   0.9 % sodium chloride infusion (has no administration in time range)   heparin (porcine) injection 5,000 Units (has no administration in time range)   ondansetron (ZOFRAN-ODT) disintegrating tablet 4 mg (has no administration in time range)     Or   ondansetron (ZOFRAN) injection 4 mg (has no administration in time range)   polyethylene glycol (GLYCOLAX) packet 17 g (has no administration in time range)   acetaminophen (TYLENOL) tablet 650 mg (has no administration in time range)     Or   acetaminophen (TYLENOL) suppository 650 mg (has no administration in time range)   sodium bicarbonate 75 mEq in dextrose 5 % and 0.45 % NaCl 1,000 mL infusion (has no administration in time range)   meropenem (MERREM) 1,000 mg in sodium chloride 0.9 % 100 mL IVPB (addEASE) (has no administration in time range)   oxyCODONE (ROXICODONE) immediate release tablet 5 mg (has no administration in time range)     Or   oxyCODONE (ROXICODONE) immediate release tablet 10 mg (has no administration in time range)   fentaNYL (SUBLIMAZE) injection 50 mcg (has no administration in time range)   ondansetron (ZOFRAN) injection 4 mg (4 mg IntraVENous Given 6/4/25 1535)   fentaNYL (SUBLIMAZE) injection 50 mcg (50 mcg IntraVENous Given 6/4/25 1535)   meropenem (MERREM) 1,000 mg in sodium chloride 0.9 % 100 mL IVPB (addEASE) (0 mg IntraVENous Stopped 6/4/25 1719)           Is this patient to be included in the SEP-1 Core Measure due to severe sepsis or septic shock?   No   Exclusion criteria - the patient is NOT to be included for SEP-1 Core Measure due to:  2+ SIRS criteria are not met        Medical Decision Making/Differential Diagnosis:    CC/HPI Summary, Social Determinants of health, Records Reviewed, DDx, testing done/not done, ED Course, Reassessment, disposition considerations/shared decision making with patient, consults, disposition:      ED Course as of 06/04/25 1757 Wed Jun 04, 2025   [id number] Spoke with Genet from neourology.

## 2025-06-04 NOTE — H&P
[facility] Hospitalist Group   History and Physical      CHIEF COMPLAINT: Left-sided flank pain, nausea, vomiting, fatigue    History of Present Illness:  55 y.o. female with a history of recurrent staghorn calculi previously referred to [facility] for PCNL but has not yet been able to follow-up, current bilateral ureteral stents, currently resides at a nursing facility.  Over the last several days she has had worsening left-sided flank pain, nausea, vomiting, poor oral intake.  No provoking factors.  Partial relief with lidocaine patch.  In the ED, labs significant for acute kidney injury and urinalysis suggestive of urinary tract infection.    Informant(s) for H&P: Patient and chart review    REVIEW OF SYSTEMS:  no fevers, chills, cp, sob, n/v, ha, vision/hearing changes, wt changes, hot/cold flashes, other open skin lesions, diarrhea, constipation, dysuria/hematuria unless noted in HPI. Complete ROS performed with the patient and is otherwise negative.      PMH:  Past Medical History:   Diagnosis Date    Altered mental status, unspecified     Anemia, iron deficiency     Anemia, unspecified     Cerebral artery occlusion with cerebral infarction (HCC)     Demand ischemia (HCC) 04/22/2025    Dysphagia following cerebral infarction     Gastroesophageal reflux disease     without esophagitis    Hemiplegia and hemiparesis following cerebral infarction affecting left non-dominant side (HCC)     Hypertension     Intestinal malabsorption, unspecified     Major depressive disorder, recurrent     Major depressive disorder, recurrent 09/14/2022    Panic attacks     Sepsis due to Escherichia coli with acute renal failure without septic shock (HCC) 04/22/2025    Sepsis due to urinary tract infection (HCC) 02/13/2025    Staghorn kidney stones 06/04/2025       Surgical History:  Past Surgical History:   Procedure Laterality Date    BLADDER SURGERY Bilateral 2/11/2022    CYSTOSCOPY RETROGRADE PYELOGRAM  protein-calorie malnutrition    Staghorn kidney stones    Complicated urinary tract infection  Resolved Problems:    * No resolved hospital problems. *    Acute kidney injury with normal anion gap metabolic acidosis  Bilateral staghorn calculi  - IV fluids D5-0.45% NaCl with 75 mEq NaHCO3 at 100 mL/h  - Discussed with urology APRN on-call as well as ED staff.  Patient has been referred to [facility] multiple times for definitive treatment of staghorn calculi as the procedures she will need are not offered at this facility.  Rather than delay care, transfer request should be initiated at this time.  If bed is not available we will admit patient for medical treatment in the meantime.    Complicated/recurrent urinary tract infection  -Blood cultures and urine cultures in process  - IV meropenem pending culture results    Iron deficiency anemia  - Will hold iron for now given acute infection    Moderate protein calorie malnutrition  - Oral nutritional supplements    History of stroke  - Continue atorvastatin  - Hold aspirin for now in anticipation of possible procedures    Depression  Anxiety  - Continue sertraline and Remeron  - Continue hydroxyzine     Primary hypertension  - Continue metoprolol succinate with hold parameters    Code Status: Full  DVT prophylaxis: Subcutaneous heparin    Disposition: Transfer to [facility], will admit for medical management if bed is not readily available    NOTE: This report was transcribed using voice recognition software. Every effort was made to ensure accuracy; however, inadvertent computerized transcription errors may be present.     Electronically signed by Wili Walker DO on 6/4/2025 at 5:15 PM

## 2025-06-05 PROBLEM — A41.50 GRAM NEGATIVE SEPSIS (HCC): Status: ACTIVE | Noted: 2025-06-05

## 2025-06-05 LAB
ALBUMIN SERPL-MCNC: 2.7 G/DL (ref 3.5–5.2)
ALP SERPL-CCNC: 158 U/L (ref 35–104)
ALT SERPL-CCNC: 54 U/L (ref 0–32)
ANION GAP SERPL CALCULATED.3IONS-SCNC: 12 MMOL/L (ref 7–16)
AST SERPL-CCNC: 36 U/L (ref 0–31)
BASOPHILS # BLD: 0 K/UL (ref 0–0.2)
BASOPHILS NFR BLD: 0 % (ref 0–2)
BILIRUB SERPL-MCNC: <0.2 MG/DL (ref 0–1.2)
BUN SERPL-MCNC: 44 MG/DL (ref 6–20)
CALCIUM SERPL-MCNC: 8.8 MG/DL (ref 8.6–10.2)
CHLORIDE SERPL-SCNC: 113 MMOL/L (ref 98–107)
CO2 SERPL-SCNC: 15 MMOL/L (ref 22–29)
CREAT SERPL-MCNC: 1.5 MG/DL (ref 0.5–1)
EOSINOPHIL # BLD: 0.39 K/UL (ref 0.05–0.5)
EOSINOPHILS RELATIVE PERCENT: 4 % (ref 0–6)
ERYTHROCYTE [DISTWIDTH] IN BLOOD BY AUTOMATED COUNT: 18.7 % (ref 11.5–15)
GFR, ESTIMATED: 40 ML/MIN/1.73M2
GLUCOSE SERPL-MCNC: 102 MG/DL (ref 74–99)
HCT VFR BLD AUTO: 27.7 % (ref 34–48)
HGB BLD-MCNC: 8.6 G/DL (ref 11.5–15.5)
LYMPHOCYTES NFR BLD: 1.57 K/UL (ref 1.5–4)
LYMPHOCYTES RELATIVE PERCENT: 14 % (ref 20–42)
MAGNESIUM SERPL-MCNC: 1.7 MG/DL (ref 1.6–2.6)
MCH RBC QN AUTO: 26.7 PG (ref 26–35)
MCHC RBC AUTO-ENTMCNC: 31 G/DL (ref 32–34.5)
MCV RBC AUTO: 86 FL (ref 80–99.9)
METAMYELOCYTES ABSOLUTE COUNT: 0.49 K/UL (ref 0–0.12)
METAMYELOCYTES: 4 % (ref 0–1)
MONOCYTES NFR BLD: 0.59 K/UL (ref 0.1–0.95)
MONOCYTES NFR BLD: 5 % (ref 2–12)
NEUTROPHILS NFR BLD: 73 % (ref 43–80)
NEUTS SEG NFR BLD: 8.15 K/UL (ref 1.8–7.3)
PHOSPHATE SERPL-MCNC: 3.5 MG/DL (ref 2.5–4.5)
PLATELET # BLD AUTO: 474 K/UL (ref 130–450)
PMV BLD AUTO: 9.7 FL (ref 7–12)
POTASSIUM SERPL-SCNC: 3.7 MMOL/L (ref 3.5–5)
PROT SERPL-MCNC: 6.1 G/DL (ref 6.4–8.3)
RBC # BLD AUTO: 3.22 M/UL (ref 3.5–5.5)
RBC # BLD: ABNORMAL 10*6/UL
SODIUM SERPL-SCNC: 140 MMOL/L (ref 132–146)
WBC OTHER # BLD: 11.2 K/UL (ref 4.5–11.5)

## 2025-06-05 PROCEDURE — 36415 COLL VENOUS BLD VENIPUNCTURE: CPT

## 2025-06-05 PROCEDURE — 83735 ASSAY OF MAGNESIUM: CPT

## 2025-06-05 PROCEDURE — 80053 COMPREHEN METABOLIC PANEL: CPT

## 2025-06-05 PROCEDURE — 85025 COMPLETE CBC W/AUTO DIFF WBC: CPT

## 2025-06-05 PROCEDURE — 2580000003 HC RX 258: Performed by: INTERNAL MEDICINE

## 2025-06-05 PROCEDURE — 6370000000 HC RX 637 (ALT 250 FOR IP): Performed by: INTERNAL MEDICINE

## 2025-06-05 PROCEDURE — 84100 ASSAY OF PHOSPHORUS: CPT

## 2025-06-05 PROCEDURE — 6360000002 HC RX W HCPCS: Performed by: INTERNAL MEDICINE

## 2025-06-05 PROCEDURE — 1200000000 HC SEMI PRIVATE

## 2025-06-05 PROCEDURE — 2500000003 HC RX 250 WO HCPCS: Performed by: INTERNAL MEDICINE

## 2025-06-05 PROCEDURE — 99233 SBSQ HOSP IP/OBS HIGH 50: CPT | Performed by: INTERNAL MEDICINE

## 2025-06-05 RX ORDER — FAMOTIDINE 20 MG/1
20 TABLET, FILM COATED ORAL DAILY
Status: DISCONTINUED | OUTPATIENT
Start: 2025-06-06 | End: 2025-06-11 | Stop reason: HOSPADM

## 2025-06-05 RX ORDER — LIDOCAINE 50 MG/G
1 PATCH TOPICAL DAILY
Status: ON HOLD | COMMUNITY
End: 2025-06-11 | Stop reason: HOSPADM

## 2025-06-05 RX ADMIN — SUCRALFATE 1 G: 1 TABLET ORAL at 09:20

## 2025-06-05 RX ADMIN — METOPROLOL SUCCINATE 12.5 MG: 25 TABLET, EXTENDED RELEASE ORAL at 09:20

## 2025-06-05 RX ADMIN — ATORVASTATIN CALCIUM 40 MG: 40 TABLET, FILM COATED ORAL at 09:20

## 2025-06-05 RX ADMIN — HEPARIN SODIUM 5000 UNITS: 5000 INJECTION INTRAVENOUS; SUBCUTANEOUS at 22:06

## 2025-06-05 RX ADMIN — PANTOPRAZOLE SODIUM 40 MG: 40 TABLET, DELAYED RELEASE ORAL at 22:04

## 2025-06-05 RX ADMIN — OXYCODONE 10 MG: 5 TABLET ORAL at 09:21

## 2025-06-05 RX ADMIN — Medication 7.5 MG: at 22:04

## 2025-06-05 RX ADMIN — Medication 1 CAPSULE: at 09:20

## 2025-06-05 RX ADMIN — SODIUM BICARBONATE: 84 INJECTION, SOLUTION INTRAVENOUS at 22:33

## 2025-06-05 RX ADMIN — OXYCODONE 10 MG: 5 TABLET ORAL at 17:03

## 2025-06-05 RX ADMIN — MEGESTROL ACETATE 20 MG: 40 TABLET ORAL at 09:20

## 2025-06-05 RX ADMIN — MIRTAZAPINE 15 MG: 15 TABLET, FILM COATED ORAL at 22:04

## 2025-06-05 RX ADMIN — Medication 2000 UNITS: at 09:20

## 2025-06-05 RX ADMIN — HEPARIN SODIUM 5000 UNITS: 5000 INJECTION INTRAVENOUS; SUBCUTANEOUS at 09:21

## 2025-06-05 RX ADMIN — SODIUM BICARBONATE: 84 INJECTION, SOLUTION INTRAVENOUS at 10:35

## 2025-06-05 RX ADMIN — SERTRALINE 50 MG: 50 TABLET, FILM COATED ORAL at 09:21

## 2025-06-05 RX ADMIN — HYDROXYZINE HYDROCHLORIDE 10 MG: 10 TABLET, FILM COATED ORAL at 09:20

## 2025-06-05 RX ADMIN — SUCRALFATE 1 G: 1 TABLET ORAL at 12:50

## 2025-06-05 RX ADMIN — MEROPENEM 1000 MG: 1 INJECTION INTRAVENOUS at 08:29

## 2025-06-05 RX ADMIN — SUCRALFATE 1 G: 1 TABLET ORAL at 22:04

## 2025-06-05 RX ADMIN — Medication 10 ML: at 00:01

## 2025-06-05 RX ADMIN — Medication 10 ML: at 22:05

## 2025-06-05 RX ADMIN — HYDROXYZINE HYDROCHLORIDE 10 MG: 10 TABLET, FILM COATED ORAL at 22:04

## 2025-06-05 RX ADMIN — PANTOPRAZOLE SODIUM 40 MG: 40 TABLET, DELAYED RELEASE ORAL at 09:20

## 2025-06-05 RX ADMIN — SUCRALFATE 1 G: 1 TABLET ORAL at 16:57

## 2025-06-05 RX ADMIN — CYANOCOBALAMIN TAB 1000 MCG 1000 MCG: 1000 TAB at 09:20

## 2025-06-05 RX ADMIN — MEROPENEM 1000 MG: 1 INJECTION INTRAVENOUS at 00:01

## 2025-06-05 RX ADMIN — MEROPENEM 1000 MG: 1 INJECTION INTRAVENOUS at 18:55

## 2025-06-05 RX ADMIN — FAMOTIDINE 20 MG: 20 TABLET, FILM COATED ORAL at 09:20

## 2025-06-05 RX ADMIN — HYDROXYZINE HYDROCHLORIDE 10 MG: 10 TABLET, FILM COATED ORAL at 15:19

## 2025-06-05 ASSESSMENT — PAIN SCALES - GENERAL
PAINLEVEL_OUTOF10: 8
PAINLEVEL_OUTOF10: 0
PAINLEVEL_OUTOF10: 7
PAINLEVEL_OUTOF10: 2
PAINLEVEL_OUTOF10: 8

## 2025-06-05 ASSESSMENT — PAIN DESCRIPTION - ORIENTATION
ORIENTATION: RIGHT;LEFT
ORIENTATION: RIGHT;LEFT

## 2025-06-05 ASSESSMENT — PAIN SCALES - WONG BAKER
WONGBAKER_NUMERICALRESPONSE: HURTS A LITTLE BIT
WONGBAKER_NUMERICALRESPONSE: HURTS A LITTLE BIT

## 2025-06-05 ASSESSMENT — PAIN DESCRIPTION - LOCATION
LOCATION: FLANK;ABDOMEN
LOCATION: ABDOMEN

## 2025-06-05 NOTE — PROGRESS NOTES
[facility] Hospitalist   Progress Note    Admitting Date and Time: 6/4/2025  2:07 PM  Admit Dx: Staghorn calculus [N20.0]  Flank pain [R10.9]  Acute kidney injury [N17.9]  Urinary tract infection with hematuria, site unspecified [N39.0, R31.9]    Subjective/interval history:    Pt was admitted with recurrent urinary tract infection yesterday associated with chronic bilateral staghorn calculi.    At this time she is awake, alert, feels much better today.  Pain is well-controlled.    ROS: denies fever, chills, cp, sob, n/v, HA unless stated above.     atorvastatin  40 mg Oral Daily    [Held by provider] aspirin  81 mg Oral Daily    Vitamin D  2,000 Units Oral Daily    famotidine  20 mg Oral BID    [Held by provider] ferrous sulfate  325 mg Oral Daily with breakfast    hydrOXYzine HCl  10 mg Oral TID    lactobacillus  1 capsule Oral Daily    [Held by provider] linaCLOtide  72 mcg Oral QAM AC    [Held by provider] lisinopril  2.5 mg Oral Daily    megestrol  20 mg Oral Daily    melatonin  7.5 mg Oral Nightly    metoprolol succinate  12.5 mg Oral Daily    mirtazapine  15 mg Oral Nightly    pantoprazole  40 mg Oral BID    sertraline  50 mg Oral Daily    sucralfate  1 g Oral 4x Daily    vitamin B-12  1,000 mcg Oral Daily    sodium chloride flush  5-40 mL IntraVENous 2 times per day    heparin (porcine)  5,000 Units SubCUTAneous BID    meropenem  1,000 mg IntraVENous Q8H     bisacodyl, 10 mg, Daily PRN  calcium carbonate, 2 tablet, Q12H PRN  docusate sodium, 100 mg, BID PRN  [Held by provider] magnesium hydroxide, 30 mL, Daily PRN  sodium chloride flush, 5-40 mL, PRN  sodium chloride, , PRN  ondansetron, 4 mg, Q8H PRN   Or  ondansetron, 4 mg, Q6H PRN  polyethylene glycol, 17 g, Daily PRN  acetaminophen, 650 mg, Q6H PRN   Or  acetaminophen, 650 mg, Q6H PRN  oxyCODONE, 5 mg, Q6H PRN   Or  oxyCODONE, 10 mg, Q6H PRN  fentanNYL, 50 mcg, Q4H PRN         Objective:    /67   Pulse 96   Temp 97.3 °F (36.3

## 2025-06-05 NOTE — CARE COORDINATION
Case Management Assessment  Initial Evaluation    Date/Time of Evaluation: 6/5/2025 2:36 PM  Assessment Completed by: KAROLINA De Guzman    If patient is discharged prior to next notation, then this note serves as note for discharge by case management.    Patient Name: Brigid Moses                   YOB: 1970  Diagnosis: Staghorn calculus [N20.0]  Flank pain [R10.9]  Acute kidney injury [N17.9]  Urinary tract infection with hematuria, site unspecified [N39.0, R31.9]                   Date / Time: 6/4/2025  2:07 PM    Patient Admission Status: Inpatient   Readmission Risk (Low < 19, Mod (19-27), High > 27): Readmission Risk Score: 26.4    Current PCP: Yessenia Perry MD  PCP verified by CM? Yes    Chart Reviewed: yes      History Provided by: Patient  Patient Orientation: Alert and Oriented, Person, Place, Situation    Patient Cognition: Alert    Hospitalization in the last 30 days (Readmission):    If yes, Readmission Assessment in CM Navigator will be completed.    Advance Directives:      Code Status: Full Code   Patient's Primary Decision Maker is: Named in Scanned ACP Document    Primary Decision Maker: Sregey Chiang - Aunt/Uncle - [phone]    Discharge Planning:    Patient lives with: Alone Type of Home: Long-Term Care    Primary Care Giver: Other (Comment) (LTC resident at [facility])    Patient Support Systems include: Other (Comment) (uncle is POA pt LTC at [facility])     Current Financial resources:    Current community resources:    Current services prior to admission: Long Term Acute Care, Skilled Nursing Facility            Current DME:              Type of Home Care services:  None    ADLS  Prior functional level: Other (see comment) (bed bound)  Current functional level: Other (see comment) (bed bound)    PT AM-PAC:   /24  OT AM-PAC:   /24    Family can provide assistance at DC: No (lives at [facility])    Would you like Case Management to discuss the discharge plan with any  other family members/significant others, and if so, who? No (relays no need to call her uncle at this time.)    Plans to Return to Present Housing: Yes  Other Identified Issues/Barriers to RETURNING to current housing:   Potential Assistance needed at discharge:             Potential DME:    Patient expects to discharge to: Long-term care  Plan for transportation at discharge:     Financial    Payor: MAKENZIE WILSON / Plan: MAKENZIE ANGULO [place] DUAL / Product Type: *No Product type* /     Does insurance require precert for SNF: No    Potential assistance Purchasing Medications: No  Meds-to-Beds request: Yes      [facility] - [place], [place] - [address] [address] - P [phone] - F [phone]  [address] [address]  [facility] [address]  Phone: [phone] Fax: [phone]      Notes:    Ss note:6/5/20252:38 PM Pt is in isolation. Met with pt, alert times 3, very pleasant, is LTC bedhold at [facility] NO PRECERT to return. PTA pt is bedbound. Pt report her Uncle Sergey is her POA and no need to call him at this time per the pt. Plan is to return to [facility] at discharge. KAROLINA Castellanos    The Plan for Transition of Care is related to the following treatment goals of Staghorn calculus [N20.0]  Flank pain [R10.9]  Acute kidney injury [N17.9]  Urinary tract infection with hematuria, site unspecified [N39.0, R31.9]

## 2025-06-05 NOTE — DISCHARGE INSTR - COC
Continuity of Care Form    Patient Name: Brigid Moses   :  1970  MRN:  [id number]    Admit date:  2025  Discharge date:  2025    Code Status Order: Full Code   Advance Directives:     Admitting Physician:  Wili Walker DO  PCP: Yessenia Perry MD    Discharging Nurse: BRITNI [facility] Unit/Room#: 0433/0433-02  Discharging Unit Phone Number: [phone]    Emergency Contact:   Extended Emergency Contact Information  Primary Emergency Contact: BHANU JARVIS  Home Phone: [phone]  Relation: Other Relative   needed? No  Secondary Emergency Contact: АндрейSergey  Address: [address] [place] [address] [place] [address]  Home Phone: [phone]  Mobile Phone: [phone]  Relation: Aunt/Uncle    Past Surgical History:  Past Surgical History:   Procedure Laterality Date    BLADDER SURGERY Bilateral 2022    CYSTOSCOPY RETROGRADE PYELOGRAM BILATERAL  STENT INSERTION performed by Benedicto Eugene DO at [facility] OR    BLADDER SURGERY Bilateral 2022    CYSTOSCOPY RETROGRADE PYELOGRAM,  URETEROSCOPY,  LASER LITHOTRIPSY - BILATERAL, WITH BILATERAL STENT EXCHANGE performed by Prince Mays MD at [facility] OR    COLONOSCOPY      COLONOSCOPY N/A 2025    COLONOSCOPY DIAGNOSTIC performed by Don Troung MD at [facility] ENDOSCOPY    DEBRIDEMENT Bilateral 2025    CYSTOSCOPY QUIÑONES INSERTION performed by Leon Mota MD at [facility] OR    DEBRIDEMENT Bilateral 2025    CYSTOSCOPY RETROGRADE PYELOGRAM BILATERAL STENT INSERTION performed by López Mays MD at [facility] OR    ENDOSCOPY, COLON, DIAGNOSTIC      FOOT SURGERY      right toes    FOOT SURGERY Left 3/27/13    correction 4th, 5th toes left foot    LAPAROTOMY      LITHOTRIPSY Bilateral 3/8/2022    CYSTOSCOPY RETROGRADE PYELOGRAM URETEROSCOPY, LASER LITHOTRIPSY performed by Prince Mays MD at [facility]    UPPER GASTROINTESTINAL ENDOSCOPY  2014    UPPER GASTROINTESTINAL ENDOSCOPY N/A 2025

## 2025-06-05 NOTE — CONSULTS
6/5/2025 9:03 AM  Brigid Aurelia  [id number]     Chief Complaint:    UTI, previous bilateral ureteral stent placement    History of Present Illness:      The patient is a 55 y.o. female patient for whom urology was consulted for the above.    Patient known to our service.  Has undergone numerous prior bilateral stone procedures.  She has a history of stroke with severe deficits.  She lives at a facility.  Recently Dr. Trimble placed bilateral ureteral stents for obstructing calculi she has bilateral staghorn calculi and has been referred to Dr. Sierra in [place] for evaluation for bilateral PCNL's.  Today patient states she does believe she has an appoint with him coming up but does not know when.  She had a Feliz catheter placed this admission.  CT shows bilateral stents in good position.      Past Medical History:   Diagnosis Date    Altered mental status, unspecified     Anemia, iron deficiency     Anemia, unspecified     Cerebral artery occlusion with cerebral infarction (HCC)     Demand ischemia (HCC) 04/22/2025    Dysphagia following cerebral infarction     Gastroesophageal reflux disease     without esophagitis    Hemiplegia and hemiparesis following cerebral infarction affecting left non-dominant side (HCC)     Hypertension     Intestinal malabsorption, unspecified     Major depressive disorder, recurrent     Major depressive disorder, recurrent 09/14/2022    Panic attacks     Sepsis due to Escherichia coli with acute renal failure without septic shock (HCC) 04/22/2025    Sepsis due to urinary tract infection (HCC) 02/13/2025    Staghorn kidney stones 06/04/2025         Past Surgical History:   Procedure Laterality Date    BLADDER SURGERY Bilateral 2/11/2022    CYSTOSCOPY RETROGRADE PYELOGRAM BILATERAL  STENT INSERTION performed by Benedicto Eugene DO at [facility] OR    BLADDER SURGERY Bilateral 4/12/2022    CYSTOSCOPY RETROGRADE PYELOGRAM,  URETEROSCOPY,  LASER LITHOTRIPSY - BILATERAL, WITH BILATERAL STENT EXCHANGE  daily  hydrOXYzine HCl (ATARAX) 10 MG tablet, Take 2.5 tablets by mouth in the morning, at noon, and at bedtime  lisinopril (PRINIVIL;ZESTRIL) 2.5 MG tablet, Take 1 tablet by mouth daily Hold for SBP less than 100  megestrol (MEGACE) 20 MG tablet, Take 1 tablet by mouth 2 times daily  famotidine (PEPCID) 20 MG tablet, Take 1 tablet by mouth 2 times daily  mirtazapine (REMERON) 15 MG tablet, Take 1 tablet by mouth daily  VITAMIN D PO, Take 125 mcg by mouth daily  magnesium hydroxide (MILK OF MAGNESIA) 400 MG/5ML suspension, Take 30 mLs by mouth daily as needed (Constipation)  docusate sodium (COLACE) 100 MG capsule, Take 1 capsule by mouth 2 times daily as needed for Constipation  acetaminophen (TYLENOL) 325 MG tablet, Take 2 tablets by mouth every 8 hours as needed for Pain or Fever  calcium carbonate (TUMS) 500 MG chewable tablet, Take 2 tablets by mouth daily For calcium supp  bisacodyl (DULCOLAX) 10 MG suppository, Place 1 suppository rectally daily as needed for Constipation  ferrous sulfate (IRON 325) 325 (65 Fe) MG tablet, Take 1 tablet by mouth daily (with breakfast)  ammonium lactate (LAC-HYDRIN) 5 % LOTN lotion, Apply 1 each topically daily as needed for Dry Skin Apply topically as needed.  polyethylene glycol (GLYCOLAX) 17 GM/SCOOP powder, Take 17 g by mouth daily as needed for Constipation  sertraline (ZOLOFT) 50 MG tablet, Take 1 tablet by mouth daily  atorvastatin (LIPITOR) 40 MG tablet, Take 1 tablet by mouth daily  MELATONIN PO, Take 8 mg by mouth nightly (Patient not taking: Reported on 6/5/2025)  vitamin B-12 (CYANOCOBALAMIN) 1000 MCG tablet, Take 1 tablet by mouth daily (Patient not taking: Reported on 6/4/2025)  iron sucrose (VENOFER) 20 MG/ML injection, Infuse 25 mLs intravenously every 14 days    Allergies:    Bactrim [sulfamethoxazole-trimethoprim]    Social History:    reports that she has quit smoking. Her smoking use included cigarettes. She has a 20 pack-year smoking history. She has never

## 2025-06-05 NOTE — ACP (ADVANCE CARE PLANNING)
Advance Care Planning   The patient has the following advanced directives on file:        The patient has appointed the following active healthcare agents:    Primary Decision Maker: Sergey Chiang - Aunt/Uncle - [phone]

## 2025-06-05 NOTE — PROGRESS NOTES
4 Eyes Skin Assessment     NAME:  Brigid Moses  YOB: 1970  MEDICAL RECORD NUMBER:  [id number]    The patient is being assessed for  Admission    I agree that at least one RN has performed a thorough Head to Toe Skin Assessment on the patient. ALL assessment sites listed below have been assessed.      Areas assessed by both nurses:    Head, Face, Ears, Shoulders, Back, Chest, Arms, Elbows, Hands, Sacrum. Buttock, Coccyx, Ischium, and Legs. Feet and Heels        Does the Patient have a Wound? No noted wound(s)       John Prevention initiated by RN: Yes  Wound Care Orders initiated by RN: No    Pressure Injury (Stage 3,4, Unstageable, DTI, NWPT, and Complex wounds) if present, place Wound referral order by RN under : No    New Ostomies, if present place, Ostomy referral order under : No     Nurse 1 eSignature: Electronically signed by Moody Bridges RN on 6/4/25 at 11:00 PM EDT    **SHARE this note so that the co-signing nurse can place an eSignature**    Nurse 2 eSignature: Electronically signed by Adele Muro RN on 6/4/25 at 11:00 PM EDT

## 2025-06-05 NOTE — PROGRESS NOTES
This patient is on medication that requires renal, weight, and/or indication dose adjustment.      Date Body Weight IBW  Adjusted BW SCr  CrCl Dialysis status   6/5/2025 45.8 kg (100 lb 14.4 oz) Ideal body weight: 50.1 kg (110 lb 7.2 oz) Serum creatinine: 1.5 mg/dL (H) 06/05/25 0502  Estimated creatinine clearance: 31 mL/min (A) N/a       Pharmacy has dose-adjusted the following medication(s):    Date Previous Order Adjusted Order   6/5/2025 Famotidine 20 mg twice a day Famotidine 20 mg once a day     These changes were made per protocol according to the [facility]   Automatic Renal Dose Adjustment Policy.     *Please note this dose may need readjusted if patient's condition changes.    Please contact pharmacy with any questions regarding these changes.    Thank you,     Mariana Macias, PharmD.  6/5/2025 2:40 PM    SJW: [phone]

## 2025-06-05 NOTE — PROGRESS NOTES
This patient is on medication that requires renal, weight, and/or indication dose adjustment.      Date Body Weight IBW  Adjusted BW SCr  CrCl Dialysis status BMI   6/5/2025 45.8 kg (100 lb 14.4 oz) Ideal body weight: 50.1 kg (110 lb 7.2 oz) Serum creatinine: 1.5 mg/dL (H) 06/05/25 0502  Estimated creatinine clearance: 31 mL/min (A) N/a Body mass index is 18.45 kg/m².       Pharmacy has dose-adjusted the following medication(s):    Ordered Medication: Meropenem 1000mg q8h     Order Changed/converted to: Meropenem 1000mg q12h    These changes were made per protocol according to the [facility]   Automatic Extended Infusion Dose Adjustment Policy.     *Please note this dose may need readjusted if patient's condition changes.    Please contact pharmacy with any questions regarding these changes.    Mariana Macias, PharmD.  6/5/2025 2:43 PM    SJW: [phone]

## 2025-06-06 LAB
ALBUMIN SERPL-MCNC: 2.4 G/DL (ref 3.5–5.2)
ALP SERPL-CCNC: 141 U/L (ref 35–104)
ALT SERPL-CCNC: 36 U/L (ref 0–32)
ANION GAP SERPL CALCULATED.3IONS-SCNC: 12 MMOL/L (ref 7–16)
AST SERPL-CCNC: 24 U/L (ref 0–31)
BASOPHILS # BLD: 0.02 K/UL (ref 0–0.2)
BASOPHILS NFR BLD: 0 % (ref 0–2)
BILIRUB SERPL-MCNC: <0.2 MG/DL (ref 0–1.2)
BUN SERPL-MCNC: 25 MG/DL (ref 6–20)
CALCIUM SERPL-MCNC: 8.4 MG/DL (ref 8.6–10.2)
CHLORIDE SERPL-SCNC: 108 MMOL/L (ref 98–107)
CO2 SERPL-SCNC: 20 MMOL/L (ref 22–29)
CREAT SERPL-MCNC: 1.3 MG/DL (ref 0.5–1)
EOSINOPHIL # BLD: 0.2 K/UL (ref 0.05–0.5)
EOSINOPHILS RELATIVE PERCENT: 4 % (ref 0–6)
ERYTHROCYTE [DISTWIDTH] IN BLOOD BY AUTOMATED COUNT: 18.2 % (ref 11.5–15)
GFR, ESTIMATED: 48 ML/MIN/1.73M2
GLUCOSE SERPL-MCNC: 114 MG/DL (ref 74–99)
HCT VFR BLD AUTO: 25.5 % (ref 34–48)
HGB BLD-MCNC: 8.2 G/DL (ref 11.5–15.5)
IMM GRANULOCYTES # BLD AUTO: 0.27 K/UL (ref 0–0.58)
IMM GRANULOCYTES NFR BLD: 5 % (ref 0–5)
LYMPHOCYTES NFR BLD: 0.99 K/UL (ref 1.5–4)
LYMPHOCYTES RELATIVE PERCENT: 18 % (ref 20–42)
MAGNESIUM SERPL-MCNC: 1.6 MG/DL (ref 1.6–2.6)
MCH RBC QN AUTO: 27.5 PG (ref 26–35)
MCHC RBC AUTO-ENTMCNC: 32.2 G/DL (ref 32–34.5)
MCV RBC AUTO: 85.6 FL (ref 80–99.9)
MONOCYTES NFR BLD: 0.59 K/UL (ref 0.1–0.95)
MONOCYTES NFR BLD: 11 % (ref 2–12)
NEUTROPHILS NFR BLD: 62 % (ref 43–80)
NEUTS SEG NFR BLD: 3.37 K/UL (ref 1.8–7.3)
PHOSPHATE SERPL-MCNC: 2.3 MG/DL (ref 2.5–4.5)
PLATELET # BLD AUTO: 407 K/UL (ref 130–450)
PMV BLD AUTO: 9.7 FL (ref 7–12)
POTASSIUM SERPL-SCNC: 3.3 MMOL/L (ref 3.5–5)
PROT SERPL-MCNC: 5.5 G/DL (ref 6.4–8.3)
RBC # BLD AUTO: 2.98 M/UL (ref 3.5–5.5)
SODIUM SERPL-SCNC: 140 MMOL/L (ref 132–146)
WBC OTHER # BLD: 5.4 K/UL (ref 4.5–11.5)

## 2025-06-06 PROCEDURE — 84100 ASSAY OF PHOSPHORUS: CPT

## 2025-06-06 PROCEDURE — 36415 COLL VENOUS BLD VENIPUNCTURE: CPT

## 2025-06-06 PROCEDURE — 2580000003 HC RX 258: Performed by: INTERNAL MEDICINE

## 2025-06-06 PROCEDURE — 80053 COMPREHEN METABOLIC PANEL: CPT

## 2025-06-06 PROCEDURE — 6370000000 HC RX 637 (ALT 250 FOR IP): Performed by: INTERNAL MEDICINE

## 2025-06-06 PROCEDURE — 1200000000 HC SEMI PRIVATE

## 2025-06-06 PROCEDURE — 6360000002 HC RX W HCPCS: Performed by: INTERNAL MEDICINE

## 2025-06-06 PROCEDURE — 85025 COMPLETE CBC W/AUTO DIFF WBC: CPT

## 2025-06-06 PROCEDURE — 2500000003 HC RX 250 WO HCPCS: Performed by: INTERNAL MEDICINE

## 2025-06-06 PROCEDURE — 83735 ASSAY OF MAGNESIUM: CPT

## 2025-06-06 PROCEDURE — 99232 SBSQ HOSP IP/OBS MODERATE 35: CPT | Performed by: INTERNAL MEDICINE

## 2025-06-06 RX ADMIN — SUCRALFATE 1 G: 1 TABLET ORAL at 16:45

## 2025-06-06 RX ADMIN — Medication 10 ML: at 09:46

## 2025-06-06 RX ADMIN — OXYCODONE 10 MG: 5 TABLET ORAL at 21:56

## 2025-06-06 RX ADMIN — ATORVASTATIN CALCIUM 40 MG: 40 TABLET, FILM COATED ORAL at 09:43

## 2025-06-06 RX ADMIN — ONDANSETRON 4 MG: 4 TABLET, ORALLY DISINTEGRATING ORAL at 21:30

## 2025-06-06 RX ADMIN — Medication: at 16:29

## 2025-06-06 RX ADMIN — MEROPENEM 1000 MG: 1 INJECTION INTRAVENOUS at 06:49

## 2025-06-06 RX ADMIN — Medication 7.5 MG: at 21:55

## 2025-06-06 RX ADMIN — MEROPENEM 1000 MG: 1 INJECTION INTRAVENOUS at 18:51

## 2025-06-06 RX ADMIN — SUCRALFATE 1 G: 1 TABLET ORAL at 09:42

## 2025-06-06 RX ADMIN — POTASSIUM BICARBONATE 50 MEQ: 978 TABLET, EFFERVESCENT ORAL at 12:11

## 2025-06-06 RX ADMIN — Medication 10 ML: at 22:01

## 2025-06-06 RX ADMIN — HYDROXYZINE HYDROCHLORIDE 10 MG: 10 TABLET, FILM COATED ORAL at 14:39

## 2025-06-06 RX ADMIN — PANTOPRAZOLE SODIUM 40 MG: 40 TABLET, DELAYED RELEASE ORAL at 09:44

## 2025-06-06 RX ADMIN — PANTOPRAZOLE SODIUM 40 MG: 40 TABLET, DELAYED RELEASE ORAL at 21:54

## 2025-06-06 RX ADMIN — HEPARIN SODIUM 5000 UNITS: 5000 INJECTION INTRAVENOUS; SUBCUTANEOUS at 09:45

## 2025-06-06 RX ADMIN — CYANOCOBALAMIN TAB 1000 MCG 1000 MCG: 1000 TAB at 09:42

## 2025-06-06 RX ADMIN — Medication 1 CAPSULE: at 09:42

## 2025-06-06 RX ADMIN — HYDROXYZINE HYDROCHLORIDE 10 MG: 10 TABLET, FILM COATED ORAL at 21:55

## 2025-06-06 RX ADMIN — SUCRALFATE 1 G: 1 TABLET ORAL at 12:11

## 2025-06-06 RX ADMIN — Medication 2000 UNITS: at 09:44

## 2025-06-06 RX ADMIN — HEPARIN SODIUM 5000 UNITS: 5000 INJECTION INTRAVENOUS; SUBCUTANEOUS at 22:01

## 2025-06-06 RX ADMIN — SERTRALINE 50 MG: 50 TABLET, FILM COATED ORAL at 09:42

## 2025-06-06 RX ADMIN — POTASSIUM BICARBONATE 50 MEQ: 978 TABLET, EFFERVESCENT ORAL at 21:56

## 2025-06-06 RX ADMIN — FAMOTIDINE 20 MG: 20 TABLET, FILM COATED ORAL at 09:42

## 2025-06-06 RX ADMIN — SUCRALFATE 1 G: 1 TABLET ORAL at 21:56

## 2025-06-06 RX ADMIN — MEGESTROL ACETATE 20 MG: 40 TABLET ORAL at 09:43

## 2025-06-06 RX ADMIN — OXYCODONE 10 MG: 5 TABLET ORAL at 09:45

## 2025-06-06 RX ADMIN — HYDROXYZINE HYDROCHLORIDE 10 MG: 10 TABLET, FILM COATED ORAL at 09:45

## 2025-06-06 RX ADMIN — MIRTAZAPINE 15 MG: 15 TABLET, FILM COATED ORAL at 21:56

## 2025-06-06 ASSESSMENT — PAIN SCALES - WONG BAKER: WONGBAKER_NUMERICALRESPONSE: HURTS A LITTLE BIT

## 2025-06-06 ASSESSMENT — PAIN DESCRIPTION - DESCRIPTORS
DESCRIPTORS: ACHING
DESCRIPTORS: ACHING;CRAMPING;DISCOMFORT;PENETRATING

## 2025-06-06 ASSESSMENT — PAIN SCALES - GENERAL
PAINLEVEL_OUTOF10: 7
PAINLEVEL_OUTOF10: 6

## 2025-06-06 ASSESSMENT — PAIN DESCRIPTION - LOCATION
LOCATION: ABDOMEN;RIB CAGE;FLANK
LOCATION: ABDOMEN;FLANK

## 2025-06-06 ASSESSMENT — PAIN DESCRIPTION - ORIENTATION
ORIENTATION: RIGHT;LEFT;LOWER;UPPER
ORIENTATION: LEFT

## 2025-06-06 NOTE — CONSULTS
Leon CLIFFORD MD at [facility] OR    DEBRIDEMENT Bilateral 4/22/2025    CYSTOSCOPY RETROGRADE PYELOGRAM BILATERAL STENT INSERTION performed by López Mays MD at [facility] OR    ENDOSCOPY, COLON, DIAGNOSTIC      FOOT SURGERY      right toes    FOOT SURGERY Left 3/27/13    correction 4th, 5th toes left foot    LAPAROTOMY      LITHOTRIPSY Bilateral 3/8/2022    CYSTOSCOPY RETROGRADE PYELOGRAM URETEROSCOPY, LASER LITHOTRIPSY performed by Prince Mays MD at [facility] OR    UPPER GASTROINTESTINAL ENDOSCOPY  05/20/2014    UPPER GASTROINTESTINAL ENDOSCOPY N/A 4/24/2025    ESOPHAGOGASTRODUODENOSCOPY performed by Don Truong MD at [facility] ENDOSCOPY       MEDICATIONS PRIOR TO ADMISSION:     Scheduled Meds:   famotidine  20 mg Oral Daily    meropenem  1,000 mg IntraVENous Q12H    atorvastatin  40 mg Oral Daily    [Held by provider] aspirin  81 mg Oral Daily    Vitamin D  2,000 Units Oral Daily    [Held by provider] ferrous sulfate  325 mg Oral Daily with breakfast    hydrOXYzine HCl  10 mg Oral TID    lactobacillus  1 capsule Oral Daily    [Held by provider] linaCLOtide  72 mcg Oral QAM AC    [Held by provider] lisinopril  2.5 mg Oral Daily    megestrol  20 mg Oral Daily    melatonin  7.5 mg Oral Nightly    metoprolol succinate  12.5 mg Oral Daily    mirtazapine  15 mg Oral Nightly    pantoprazole  40 mg Oral BID    sertraline  50 mg Oral Daily    sucralfate  1 g Oral 4x Daily    vitamin B-12  1,000 mcg Oral Daily    sodium chloride flush  5-40 mL IntraVENous 2 times per day    heparin (porcine)  5,000 Units SubCUTAneous BID     Continuous Infusions:   sodium chloride       PRN Meds:bisacodyl, calcium carbonate, docusate sodium, [Held by provider] magnesium hydroxide, sodium chloride flush, sodium chloride, ondansetron **OR** ondansetron, polyethylene glycol, acetaminophen **OR** acetaminophen, oxyCODONE **OR** oxyCODONE, fentanNYL    ALLERGIES:    Bactrim [sulfamethoxazole-trimethoprim]  SOCIAL HISTORY:   TOBACCO:   reports that  AEROBIC BLD BOTTLE     Direct Exam DIRECT GRAM STAIN FROM BOTTLE: GRAM NEGATIVE RODS     Comment: Direct Gram Stain from bottle and Polymerase Chain Reaction (PCR) results called to and read   back by: WILVER SAL RN 6/5/25 AT 0838           Culture ESCHERICHIA COLI Susceptibility to follow.     Candida auris Not Detected     Candida albicans Not Detected     Candida glabrata Not Detected     Candida krusei Not Detected     Candida parapsilosis Not Detected     Candida tropicalis Not Detected     Cryptococcus neoformans/gattii Not Detected     Acinetobacter calcoaceticus-baumannii complex Not Detected     Escherichia Coli DETECTED     Enterobacter Cloacae Complex Not Detected     Enterobacterales DETECTED     Klebsiella oxytoca Not Detected     Klebsiella aerogenes Not Detected     Klebsiella pneumoniae group Not Detected     Pseudomonas aeruginosa Not Detected     Proteus spp Not Detected     Salmonella species Not Detected     SERRATIA MARCESCENS Not Detected     Stenotrophomonas maltophilia Not Detected     Haemophilus influenzae Not Detected     Listeria monocytogenes Not Detected     Bacteroides fragilis Not Detected     Neisseria Meningitidis, NMNI Not Detected     Staphylococcus spp Not Detected     Staphylococcus Aureus Not Detected     Staphylococcus epidermidis Not Detected     Staphylococcus lugdunensis Not Detected     Streptococcus spp Not Detected     Enterococcus faecalis Not Detected     Enterococcus faecium Not Detected     Streptococcus agalactiae (Group B) Not Detected     Streptococcus pneumoniae Not Detected     Streptococcus pyogenes Not Detected     Resistant gene imp by PCR Not Detected     Resistant gene ctx-m by PCR DETECTED     Resistant gene kpc by PCR Not Detected     Colistin Resistance mcr-1 gene by PCR Not Detected     Resistant gene ndm by PCR Not Detected     Resistant gene oxa-48-like by pcr Not Detected     Resistant gene vim by PCR Not Detected    Culture, Blood 2 [[id number]]   Line  Wound care    Please note that 30 minutes were spent on this case in regards to the intensity and complexity inherent to hospital inpatient or observation care associated with a confirmed or suspected infectious disease.  Infection should be closely followed for signs of improvement or worsening condition.  Therapy requires intensive monitoring for antimicrobial agent toxicity. This included education to patient/representative and/for hospital staff in regards to disease transmission risk assessment and mitigation, public health investigation, analysis and testing, and complex antimicrobial therapy counseling and treatment.       Thank you for allowing me to participate in this patient's care.  Please call [phone]  for any questions or concerns.    Electronically signed by Ana Rosa Mcnulty MD on 6/6/2025 at 9:16 AM

## 2025-06-06 NOTE — PROGRESS NOTES
[facility] Hospitalist   Progress Note    Admitting Date and Time: 6/4/2025  2:07 PM  Admit Dx: Staghorn calculus [N20.0]  Flank pain [R10.9]  Acute kidney injury [N17.9]  Urinary tract infection with hematuria, site unspecified [N39.0, R31.9]    Subjective/interval history:    6/5: Pt was admitted with recurrent urinary tract infection yesterday associated with chronic bilateral staghorn calculi.    At this time she is awake, alert, feels much better today.  Pain is well-controlled.    6/6: Patient continues to gradually feel better.  Tolerating diet.  Tolerating antimicrobials.    ROS: denies fever, chills, cp, sob, n/v, HA unless stated above.     famotidine  20 mg Oral Daily    meropenem  1,000 mg IntraVENous Q12H    atorvastatin  40 mg Oral Daily    [Held by provider] aspirin  81 mg Oral Daily    Vitamin D  2,000 Units Oral Daily    [Held by provider] ferrous sulfate  325 mg Oral Daily with breakfast    hydrOXYzine HCl  10 mg Oral TID    lactobacillus  1 capsule Oral Daily    [Held by provider] linaCLOtide  72 mcg Oral QAM AC    [Held by provider] lisinopril  2.5 mg Oral Daily    megestrol  20 mg Oral Daily    melatonin  7.5 mg Oral Nightly    metoprolol succinate  12.5 mg Oral Daily    mirtazapine  15 mg Oral Nightly    pantoprazole  40 mg Oral BID    sertraline  50 mg Oral Daily    sucralfate  1 g Oral 4x Daily    vitamin B-12  1,000 mcg Oral Daily    sodium chloride flush  5-40 mL IntraVENous 2 times per day    heparin (porcine)  5,000 Units SubCUTAneous BID     bisacodyl, 10 mg, Daily PRN  calcium carbonate, 2 tablet, Q12H PRN  docusate sodium, 100 mg, BID PRN  [Held by provider] magnesium hydroxide, 30 mL, Daily PRN  sodium chloride flush, 5-40 mL, PRN  sodium chloride, , PRN  ondansetron, 4 mg, Q8H PRN   Or  ondansetron, 4 mg, Q6H PRN  polyethylene glycol, 17 g, Daily PRN  acetaminophen, 650 mg, Q6H PRN   Or  acetaminophen, 650 mg, Q6H PRN  oxyCODONE, 5 mg, Q6H PRN   Or  oxyCODONE, 10

## 2025-06-07 LAB
ACB COMPLEX DNA BLD POS QL NAA+NON-PROBE: NOT DETECTED
ALBUMIN SERPL-MCNC: 2.5 G/DL (ref 3.5–5.2)
ALP SERPL-CCNC: 144 U/L (ref 35–104)
ALT SERPL-CCNC: 29 U/L (ref 0–32)
ANION GAP SERPL CALCULATED.3IONS-SCNC: 10 MMOL/L (ref 7–16)
AST SERPL-CCNC: 27 U/L (ref 0–31)
B FRAGILIS DNA BLD POS QL NAA+NON-PROBE: NOT DETECTED
BASOPHILS # BLD: 0.03 K/UL (ref 0–0.2)
BASOPHILS NFR BLD: 0 % (ref 0–2)
BILIRUB SERPL-MCNC: <0.2 MG/DL (ref 0–1.2)
BIOFIRE TEST COMMENT: ABNORMAL
BLACTX-M ISLT/SPM QL: DETECTED
BLAIMP ISLT/SPM QL: NOT DETECTED
BLAKPC ISLT/SPM QL: NOT DETECTED
BLAOXA-48-LIKE ISLT/SPM QL: NOT DETECTED
BLAVIM ISLT/SPM QL: NOT DETECTED
BUN SERPL-MCNC: 20 MG/DL (ref 6–20)
C ALBICANS DNA BLD POS QL NAA+NON-PROBE: NOT DETECTED
C AURIS DNA BLD POS QL NAA+NON-PROBE: NOT DETECTED
C GATTII+NEOFOR DNA BLD POS QL NAA+N-PRB: NOT DETECTED
C GLABRATA DNA BLD POS QL NAA+NON-PROBE: NOT DETECTED
C KRUSEI DNA BLD POS QL NAA+NON-PROBE: NOT DETECTED
C PARAP DNA BLD POS QL NAA+NON-PROBE: NOT DETECTED
C TROPICLS DNA BLD POS QL NAA+NON-PROBE: NOT DETECTED
CALCIUM SERPL-MCNC: 8.3 MG/DL (ref 8.6–10.2)
CHLORIDE SERPL-SCNC: 104 MMOL/L (ref 98–107)
CO2 SERPL-SCNC: 24 MMOL/L (ref 22–29)
COLISTIN RES MCR-1 ISLT/SPM QL: NOT DETECTED
CREAT SERPL-MCNC: 1.3 MG/DL (ref 0.5–1)
E CLOAC COMP DNA BLD POS NAA+NON-PROBE: NOT DETECTED
E COLI DNA BLD POS QL NAA+NON-PROBE: DETECTED
E FAECALIS DNA BLD POS QL NAA+NON-PROBE: NOT DETECTED
E FAECIUM DNA BLD POS QL NAA+NON-PROBE: NOT DETECTED
ENTEROBACTERALES DNA BLD POS NAA+N-PRB: DETECTED
EOSINOPHIL # BLD: 0.31 K/UL (ref 0.05–0.5)
EOSINOPHILS RELATIVE PERCENT: 5 % (ref 0–6)
ERYTHROCYTE [DISTWIDTH] IN BLOOD BY AUTOMATED COUNT: 18 % (ref 11.5–15)
GFR, ESTIMATED: 49 ML/MIN/1.73M2
GLUCOSE SERPL-MCNC: 91 MG/DL (ref 74–99)
GP B STREP DNA BLD POS QL NAA+NON-PROBE: NOT DETECTED
HAEM INFLU DNA BLD POS QL NAA+NON-PROBE: NOT DETECTED
HCT VFR BLD AUTO: 25.9 % (ref 34–48)
HGB BLD-MCNC: 7.9 G/DL (ref 11.5–15.5)
IMM GRANULOCYTES # BLD AUTO: 0.23 K/UL (ref 0–0.58)
IMM GRANULOCYTES NFR BLD: 3 % (ref 0–5)
K OXYTOCA DNA BLD POS QL NAA+NON-PROBE: NOT DETECTED
KLEBSIELLA SP DNA BLD POS QL NAA+NON-PRB: NOT DETECTED
KLEBSIELLA SP DNA BLD POS QL NAA+NON-PRB: NOT DETECTED
L MONOCYTOG DNA BLD POS QL NAA+NON-PROBE: NOT DETECTED
LYMPHOCYTES NFR BLD: 1.87 K/UL (ref 1.5–4)
LYMPHOCYTES RELATIVE PERCENT: 28 % (ref 20–42)
MAGNESIUM SERPL-MCNC: 1.6 MG/DL (ref 1.6–2.6)
MCH RBC QN AUTO: 26.6 PG (ref 26–35)
MCHC RBC AUTO-ENTMCNC: 30.5 G/DL (ref 32–34.5)
MCV RBC AUTO: 87.2 FL (ref 80–99.9)
MICROORGANISM SPEC CULT: ABNORMAL
MICROORGANISM/AGENT SPEC: ABNORMAL
MICROORGANISM/AGENT SPEC: ABNORMAL
MONOCYTES NFR BLD: 0.68 K/UL (ref 0.1–0.95)
MONOCYTES NFR BLD: 10 % (ref 2–12)
N MEN DNA BLD POS QL NAA+NON-PROBE: NOT DETECTED
NEUTROPHILS NFR BLD: 53 % (ref 43–80)
NEUTS SEG NFR BLD: 3.55 K/UL (ref 1.8–7.3)
P AERUGINOSA DNA BLD POS NAA+NON-PROBE: NOT DETECTED
PHOSPHATE SERPL-MCNC: 2.1 MG/DL (ref 2.5–4.5)
PLATELET # BLD AUTO: 430 K/UL (ref 130–450)
PMV BLD AUTO: 9.5 FL (ref 7–12)
POTASSIUM SERPL-SCNC: 4.4 MMOL/L (ref 3.5–5)
PROT SERPL-MCNC: 5.5 G/DL (ref 6.4–8.3)
PROTEUS SP DNA BLD POS QL NAA+NON-PROBE: NOT DETECTED
RBC # BLD AUTO: 2.97 M/UL (ref 3.5–5.5)
RESISTANT GENE NDM BY PCR: NOT DETECTED
S AUREUS DNA BLD POS QL NAA+NON-PROBE: NOT DETECTED
S AUREUS+CONS DNA BLD POS NAA+NON-PROBE: NOT DETECTED
S EPIDERMIDIS DNA BLD POS QL NAA+NON-PRB: NOT DETECTED
S LUGDUNENSIS DNA BLD POS QL NAA+NON-PRB: NOT DETECTED
S MALTOPHILIA DNA BLD POS QL NAA+NON-PRB: NOT DETECTED
S MARCESCENS DNA BLD POS NAA+NON-PROBE: NOT DETECTED
S PNEUM DNA BLD POS QL NAA+NON-PROBE: NOT DETECTED
S PYO DNA BLD POS QL NAA+NON-PROBE: NOT DETECTED
SALMONELLA DNA BLD POS QL NAA+NON-PROBE: NOT DETECTED
SERVICE CMNT-IMP: ABNORMAL
SODIUM SERPL-SCNC: 138 MMOL/L (ref 132–146)
SPECIMEN DESCRIPTION: ABNORMAL
STREPTOCOCCUS DNA BLD POS NAA+NON-PROBE: NOT DETECTED
WBC OTHER # BLD: 6.7 K/UL (ref 4.5–11.5)

## 2025-06-07 PROCEDURE — 6360000002 HC RX W HCPCS: Performed by: INTERNAL MEDICINE

## 2025-06-07 PROCEDURE — 84100 ASSAY OF PHOSPHORUS: CPT

## 2025-06-07 PROCEDURE — 2500000003 HC RX 250 WO HCPCS: Performed by: INTERNAL MEDICINE

## 2025-06-07 PROCEDURE — 1200000000 HC SEMI PRIVATE

## 2025-06-07 PROCEDURE — 6370000000 HC RX 637 (ALT 250 FOR IP): Performed by: INTERNAL MEDICINE

## 2025-06-07 PROCEDURE — 36415 COLL VENOUS BLD VENIPUNCTURE: CPT

## 2025-06-07 PROCEDURE — 99232 SBSQ HOSP IP/OBS MODERATE 35: CPT | Performed by: INTERNAL MEDICINE

## 2025-06-07 PROCEDURE — 85025 COMPLETE CBC W/AUTO DIFF WBC: CPT

## 2025-06-07 PROCEDURE — 80053 COMPREHEN METABOLIC PANEL: CPT

## 2025-06-07 PROCEDURE — 2580000003 HC RX 258: Performed by: INTERNAL MEDICINE

## 2025-06-07 PROCEDURE — 83735 ASSAY OF MAGNESIUM: CPT

## 2025-06-07 RX ADMIN — SUCRALFATE 1 G: 1 TABLET ORAL at 21:41

## 2025-06-07 RX ADMIN — SUCRALFATE 1 G: 1 TABLET ORAL at 12:28

## 2025-06-07 RX ADMIN — HYDROXYZINE HYDROCHLORIDE 10 MG: 10 TABLET, FILM COATED ORAL at 08:30

## 2025-06-07 RX ADMIN — ONDANSETRON 4 MG: 2 INJECTION, SOLUTION INTRAMUSCULAR; INTRAVENOUS at 16:53

## 2025-06-07 RX ADMIN — MEROPENEM 1000 MG: 1 INJECTION INTRAVENOUS at 07:00

## 2025-06-07 RX ADMIN — ASPIRIN 81 MG: 81 TABLET, DELAYED RELEASE ORAL at 08:30

## 2025-06-07 RX ADMIN — MEGESTROL ACETATE 20 MG: 40 TABLET ORAL at 08:30

## 2025-06-07 RX ADMIN — SUCRALFATE 1 G: 1 TABLET ORAL at 08:30

## 2025-06-07 RX ADMIN — HYDROXYZINE HYDROCHLORIDE 10 MG: 10 TABLET, FILM COATED ORAL at 21:41

## 2025-06-07 RX ADMIN — PANTOPRAZOLE SODIUM 40 MG: 40 TABLET, DELAYED RELEASE ORAL at 21:41

## 2025-06-07 RX ADMIN — Medication 10 ML: at 21:41

## 2025-06-07 RX ADMIN — FAMOTIDINE 20 MG: 20 TABLET, FILM COATED ORAL at 08:30

## 2025-06-07 RX ADMIN — CYANOCOBALAMIN TAB 1000 MCG 1000 MCG: 1000 TAB at 08:30

## 2025-06-07 RX ADMIN — MEROPENEM 1000 MG: 1 INJECTION INTRAVENOUS at 18:55

## 2025-06-07 RX ADMIN — ATORVASTATIN CALCIUM 40 MG: 40 TABLET, FILM COATED ORAL at 08:31

## 2025-06-07 RX ADMIN — MIRTAZAPINE 15 MG: 15 TABLET, FILM COATED ORAL at 21:41

## 2025-06-07 RX ADMIN — OXYCODONE 5 MG: 5 TABLET ORAL at 08:42

## 2025-06-07 RX ADMIN — DIBASIC SODIUM PHOSPHATE, MONOBASIC POTASSIUM PHOSPHATE AND MONOBASIC SODIUM PHOSPHATE 2 TABLET: 852; 155; 130 TABLET ORAL at 12:12

## 2025-06-07 RX ADMIN — PANTOPRAZOLE SODIUM 40 MG: 40 TABLET, DELAYED RELEASE ORAL at 08:30

## 2025-06-07 RX ADMIN — SERTRALINE 50 MG: 50 TABLET, FILM COATED ORAL at 08:30

## 2025-06-07 RX ADMIN — Medication 7.5 MG: at 21:39

## 2025-06-07 RX ADMIN — Medication 1 CAPSULE: at 08:30

## 2025-06-07 RX ADMIN — Medication 10 ML: at 08:31

## 2025-06-07 RX ADMIN — HYDROXYZINE HYDROCHLORIDE 10 MG: 10 TABLET, FILM COATED ORAL at 16:49

## 2025-06-07 RX ADMIN — OXYCODONE 10 MG: 5 TABLET ORAL at 21:39

## 2025-06-07 RX ADMIN — DIBASIC SODIUM PHOSPHATE, MONOBASIC POTASSIUM PHOSPHATE AND MONOBASIC SODIUM PHOSPHATE 2 TABLET: 852; 155; 130 TABLET ORAL at 21:41

## 2025-06-07 RX ADMIN — Medication 2000 UNITS: at 08:30

## 2025-06-07 RX ADMIN — SUCRALFATE 1 G: 1 TABLET ORAL at 16:50

## 2025-06-07 RX ADMIN — HEPARIN SODIUM 5000 UNITS: 5000 INJECTION INTRAVENOUS; SUBCUTANEOUS at 21:39

## 2025-06-07 RX ADMIN — HEPARIN SODIUM 5000 UNITS: 5000 INJECTION INTRAVENOUS; SUBCUTANEOUS at 08:42

## 2025-06-07 ASSESSMENT — PAIN DESCRIPTION - ORIENTATION
ORIENTATION: LEFT
ORIENTATION: LEFT

## 2025-06-07 ASSESSMENT — PAIN DESCRIPTION - LOCATION: LOCATION: ABDOMEN;FLANK

## 2025-06-07 ASSESSMENT — PAIN SCALES - GENERAL
PAINLEVEL_OUTOF10: 5
PAINLEVEL_OUTOF10: 8

## 2025-06-07 ASSESSMENT — PAIN DESCRIPTION - DESCRIPTORS: DESCRIPTORS: CRAMPING

## 2025-06-07 NOTE — PROGRESS NOTES
[facility]  ([facility]/ Tabatha/Pat)      PROGRESS NOTE         PATIENT NAME: Brigid Moses   YOB: 1970  ADMISSION DATE: 6/4/2025  2:07 PM   TODAY'S DATE: 6/7/2025        Subjective       Patient is comfortable this time with no issues      Objective     VS:   BP 92/65   Pulse 82   Temp 98.2 °F (36.8 °C) (Oral)   Resp 18   Ht 1.575 m (5' 2\")   Wt 45.8 kg (100 lb 14.4 oz)   LMP  (LMP Unknown)   SpO2 96%   BMI 18.45 kg/m²     I & O - 24hr:    Intake/Output Summary (Last 24 hours) at 6/7/2025 0901  Last data filed at 6/7/2025 0519  Gross per 24 hour   Intake 310 ml   Output 1100 ml   Net -790 ml         Physical Exam:  General:  Neck:  Resp:  Abdomen:   No acute distress  Supple  Normal effort  Soft, non-tender, nondistended   :  Deferred   Skin: Skin color, texture, turgor normal, no rashes or lesions       Labs and Imaging Studies   Labs:    CBC:   Recent Labs     06/05/25  0502 06/06/25  0843 06/07/25  0635   WBC 11.2 5.4 6.7   HGB 8.6* 8.2* 7.9*   HCT 27.7* 25.5* 25.9*   MCV 86.0 85.6 87.2   * 407 430     BMP:  Recent Labs     06/05/25  0502 06/06/25  0843 06/07/25  0635    140 138   K 3.7 3.3* 4.4   * 108* 104   CO2 15* 20* 24   PHOS 3.5 2.3* 2.1*   BUN 44* 25* 20   CREATININE 1.5* 1.3* 1.3*       Magnesium:   Lab Results   Component Value Date/Time    MG 1.6 06/07/2025 06:35 AM      Phosphate:   Lab Results   Component Value Date/Time    PHOS 2.1 06/07/2025 06:35 AM     PT/INR: No results for input(s): \"PROTIME\", \"INR\" in the last 72 hours.    U/A:   Lab Results   Component Value Date/Time    LEUKOCYTESUR LARGE 06/04/2025 02:40 PM    PHUR 8.5 06/04/2025 02:40 PM    PHUR 5.5 02/09/2022 02:26 PM    WBCUA 21 TO 50 06/04/2025 02:40 PM    WBCUA 1-3 05/27/2011 09:45 AM    RBCUA 6 TO 9 06/04/2025 02:40 PM    RBCUA 1-3 05/27/2011 09:45 AM    BACTERIA 3+ 06/04/2025 02:40 PM    BLOODU SMALL 02/09/2022 02:26 PM    GLUCOSEU NEGATIVE 06/04/2025 02:40 PM    GLUCOSEU NEGATIVE 05/27/2011

## 2025-06-07 NOTE — PROGRESS NOTES
[facility] Hospitalist   Progress Note    Admitting Date and Time: 6/4/2025  2:07 PM  Admit Dx: Staghorn calculus [N20.0]  Flank pain [R10.9]  Acute kidney injury [N17.9]  Urinary tract infection with hematuria, site unspecified [N39.0, R31.9]    Subjective/interval history:    6/5: Pt was admitted with recurrent urinary tract infection yesterday associated with chronic bilateral staghorn calculi.    At this time she is awake, alert, feels much better today.  Pain is well-controlled.    6/6: Patient continues to gradually feel better.  Tolerating diet.  Tolerating antimicrobials.    6/7: Patient is awake, alert, no new complaints.  Feeling gradually better.    ROS: denies fever, chills, cp, sob, n/v, HA unless stated above.     famotidine  20 mg Oral Daily    meropenem  1,000 mg IntraVENous Q12H    atorvastatin  40 mg Oral Daily    aspirin  81 mg Oral Daily    Vitamin D  2,000 Units Oral Daily    [Held by provider] ferrous sulfate  325 mg Oral Daily with breakfast    hydrOXYzine HCl  10 mg Oral TID    lactobacillus  1 capsule Oral Daily    [Held by provider] linaCLOtide  72 mcg Oral QAM AC    [Held by provider] lisinopril  2.5 mg Oral Daily    megestrol  20 mg Oral Daily    melatonin  7.5 mg Oral Nightly    metoprolol succinate  12.5 mg Oral Daily    mirtazapine  15 mg Oral Nightly    pantoprazole  40 mg Oral BID    sertraline  50 mg Oral Daily    sucralfate  1 g Oral 4x Daily    vitamin B-12  1,000 mcg Oral Daily    sodium chloride flush  5-40 mL IntraVENous 2 times per day    heparin (porcine)  5,000 Units SubCUTAneous BID     bisacodyl, 10 mg, Daily PRN  calcium carbonate, 2 tablet, Q12H PRN  docusate sodium, 100 mg, BID PRN  [Held by provider] magnesium hydroxide, 30 mL, Daily PRN  sodium chloride flush, 5-40 mL, PRN  sodium chloride, , PRN  ondansetron, 4 mg, Q8H PRN   Or  ondansetron, 4 mg, Q6H PRN  polyethylene glycol, 17 g, Daily PRN  acetaminophen, 650 mg, Q6H PRN   Or  acetaminophen, 650  mg, Q6H PRN  oxyCODONE, 5 mg, Q6H PRN   Or  oxyCODONE, 10 mg, Q6H PRN  fentanNYL, 50 mcg, Q4H PRN         Objective:    BP 92/65   Pulse 82   Temp 98.2 °F (36.8 °C) (Oral)   Resp 18   Ht 1.575 m (5' 2\")   Wt 45.8 kg (100 lb 14.4 oz)   LMP  (LMP Unknown)   SpO2 96%   BMI 18.45 kg/m²   General Appearance: Chronically ill-appearing, oriented x 3, not in any distress  Skin: warm and dry  Head: normocephalic and atraumatic  Eyes: extraocular eye movements intact, conjunctivae normal, anicteric sclerae  ENT: Oral mucosa moist, throat clear  Neck: Trachea midline  Pulmonary/Chest: Normal effort on room air.  Clear to auscultation bilaterally without wheeze, crackles, rhonchi  Cardiovascular: normal rate, normal S1 and S2 and no appreciable JVD  Abdomen: Normal bowel sounds.  Soft, diffuse tenderness without peritoneal signs, no palpable mass organomegaly  Extremities: Chronic contractures of the upper and lower extremities, mild dependent pedal edema,  Neurologic: no cranial nerve deficit and speech normal, chronic contractures, torticollis      Recent Labs     06/05/25  0502 06/06/25  0843 06/07/25  0635    140 138   K 3.7 3.3* 4.4   * 108* 104   CO2 15* 20* 24   BUN 44* 25* 20   CREATININE 1.5* 1.3* 1.3*   GLUCOSE 102* 114* 91   CALCIUM 8.8 8.4* 8.3*       Recent Labs     06/05/25  0502 06/06/25  0843 06/07/25  0635   ALKPHOS 158* 141* 144*   BILITOT <0.2 <0.2 <0.2   AST 36* 24 27   ALT 54* 36* 29       Recent Labs     06/05/25  0502 06/06/25  0843 06/07/25  0635   WBC 11.2 5.4 6.7   RBC 3.22* 2.98* 2.97*   HGB 8.6* 8.2* 7.9*   HCT 27.7* 25.5* 25.9*   MCV 86.0 85.6 87.2   MCH 26.7 27.5 26.6   MCHC 31.0* 32.2 30.5*   RDW 18.7* 18.2* 18.0*   * 407 430   MPV 9.7 9.7 9.5       Radiology:   CT CHEST WO CONTRAST   Final Result   1. New mild T12 compression fracture with new mild superior endplate fracture.   2. Stable mild compression fractures with moderate superior endplate   fractures of L1 and  L2.   3. Stable mild superior L3 endplate fracture.   4. No sign of fracture of the ribs, sternum, manubrium, or visualized   shoulder girdle.   5. Stable moderate scoliosis of the thoracic and lumbar spine convex towards   the right.   6. Mild hazy infiltrate throughout the left lung, more prominent in the   posterior lung base, consistent with atelectasis related to chronic decreased   size of the left hemithorax from scoliosis.         CT ABDOMEN PELVIS WO CONTRAST Additional Contrast? None   Final Result   Large colorectal stool burden with colorectal mural thickening and mild   perienteric inflammatory changes.  Findings concerning for stercoral colitis.      Multiple bilateral renal and ureteral calculi with bilateral double-J   ureteral stents in place.      Gas noted in the left renal collecting system.      Mild right hydronephrosis.             Assessment and Plan:  Principal Problem:    Acute kidney injury  Active Problems:    Primary hypertension    Major depressive disorder, recurrent    Metabolic acidosis    Moderate protein-calorie malnutrition    Staghorn kidney stones    Complicated urinary tract infection    Gram negative sepsis (HCC)  Resolved Problems:    * No resolved hospital problems. *       Complicated/recurrent urinary tract infection with E. coli bacteremia  - Blood cultures showing ESBL producing E. coli  - Urine culture showing ESBL producing E. coli as well as Pseudomonas aeruginosa  - ID following and managing antimicrobials.  Currently on meropenem    Acute kidney injury with normal anion gap metabolic acidosis  Bilateral staghorn calculi  Hypokalemia  -Creatinine trending down and metabolic acidosis resolved  - Oral intake has been within her normal range  - Discontinue IV fluids today  - Electrolytes and replete as needed  - Will give K-Phos Neutral p.o. today  - She will need follow-up with [facility] for definitive stone treatment    Iron deficiency anemia  - Will hold iron for

## 2025-06-07 NOTE — PROGRESS NOTES
NAME: Brigid Moses  MR:  [id number]  :   1970  Admit Date:  2025    Elements of this note, were copied and pasted from Previous. Updates have been made where noted and reflect current exam and medical decision making from the DOS of this encounter.  CHIEF COMPLAINT       Chief Complaint   Patient presents with    Flank Pain     [facility] for staghorn kidney stone, needs transferred to [facility]     HISTORY OF PRESENT ILLNESS     Brigid Moses is a 55 y.o. female admitted on 2025    Patient Active Problem List   Diagnosis    Hammer toe, acquired    Anemia, unspecified    Dysphagia following cerebral infarction    Hemiplegia and hemiparesis following cerebral infarction affecting left non-dominant side (HCC)    Primary hypertension    Major depressive disorder, recurrent    Constipation    Metabolic acidosis    Acute kidney injury    Obstructive uropathy    Moderate protein-calorie malnutrition    Staghorn kidney stones    Complicated urinary tract infection    Gram negative sepsis (HCC)       This is a face to face encounter   25 in bed nad  no family present     Admitted for   Staghorn calculus [N20.0]  Flank pain [R10.9]  Acute kidney injury [N17.9]  Urinary tract infection with hematuria, site unspecified [N39.0, R31.9]    ASSESSMENT/PLAN    Leukocytosis  Escherichia Coli bacteremia recurrent   H/o ESBL E coli 2025  Complicated Uti   Multiple bilateral nonobstructing renal calculi as well as   multiple calculi within the ureters.  Staghorn  Bilateral double-J ureteral stents  Gas noted in the left renal collecting system.    Mild right hydronephrosis.    Bilateral periureteral and mild perinephric fat stranding.   Willie        meropenem (MERREM) 1,000 mg in sodium chloride 0.9 % 100 mL IVPB (addEASE), Q12H can d/c with ertapenem      With staghorn calculi may need suppression after IV  til stones are removed             Patient is tolerating medications. No reported adverse drug  Specimen Description .BLOOD     Special Requests          Direct Exam DIRECT GRAM STAIN FROM BOTTLE: GRAM NEGATIVE RODS Previous panic on this admission, call not needed per  SOP.     Culture ESCHERICHIA COLI ESBL refer to the blood cx from 6/4/25 at 1528 for susceptibility results This organism is an Extended Spectrum Beta Lactamase (ESBL)  and resistance to therapy with penicillins, cephalosporins and aztreonam is expected. These organisms generally remain susceptible to carbapenems. Consider ID Consultation. CONTACT PRECAUTIONS INDICATED.      Culture, Urine [[id number]]  (Abnormal)  (Susceptibility) Collected: 06/04/25 1440    Order Status: Completed Specimen: Urine, clean catch Updated: 06/07/25 0959     Specimen Description .CLEAN CATCH URINE     Special Requests Site: Urine     Culture ESCHERICHIA COLI >100,000 CFU/ML      PSEUDOMONAS AERUGINOSA 10 to 50,000 CFU/ML      <10,000 CFU/ML PROTEUS SPECIES    Susceptibility        Escherichia coli      BACTERIAL SUSCEPTIBILITY PANEL DEAN (Preliminary)      ampicillin >=32  Resistant      ceFAZolin >=32  Resistant  [1]       cefepime >=32  Resistant      cefOXitin 8  Sensitive      cefTRIAXone >=64  Resistant      ciprofloxacin >=4  Resistant      ertapenem <=0.12  Sensitive      levofloxacin >=8  Resistant      meropenem <=0.25  Sensitive      nitrofurantoin <=16  Sensitive      piperacillin-tazobactam <=4  Sensitive      tobramycin <=1  Sensitive      trimethoprim-sulfamethoxazole <=20  Sensitive                   [1]  Cefazolin sensitivity results can be used to predict the effectiveness of oral   cephalosporins (eg. Cephalexin) in uncomplicated Urinary Tract Infections due to E. coli, K.   pneumoniae, and P. mirabilis                  Susceptibility        Pseudomonas aeruginosa      BACTERIAL SUSCEPTIBILITY PANEL DEAN (Preliminary)      cefepime 4  Sensitive      gentamicin <=1  Sensitive      levofloxacin 0.25  Sensitive      meropenem 4  Intermediate

## 2025-06-08 PROCEDURE — 2500000003 HC RX 250 WO HCPCS: Performed by: INTERNAL MEDICINE

## 2025-06-08 PROCEDURE — 99232 SBSQ HOSP IP/OBS MODERATE 35: CPT | Performed by: INTERNAL MEDICINE

## 2025-06-08 PROCEDURE — 6370000000 HC RX 637 (ALT 250 FOR IP): Performed by: INTERNAL MEDICINE

## 2025-06-08 PROCEDURE — 6360000002 HC RX W HCPCS: Performed by: INTERNAL MEDICINE

## 2025-06-08 PROCEDURE — 1200000000 HC SEMI PRIVATE

## 2025-06-08 PROCEDURE — 2580000003 HC RX 258: Performed by: INTERNAL MEDICINE

## 2025-06-08 RX ORDER — LIDOCAINE HYDROCHLORIDE 10 MG/ML
50 INJECTION, SOLUTION INFILTRATION; PERINEURAL ONCE
Status: DISCONTINUED | OUTPATIENT
Start: 2025-06-08 | End: 2025-06-11 | Stop reason: HOSPADM

## 2025-06-08 RX ORDER — SODIUM CHLORIDE 0.9 % (FLUSH) 0.9 %
5-40 SYRINGE (ML) INJECTION EVERY 12 HOURS SCHEDULED
Status: DISCONTINUED | OUTPATIENT
Start: 2025-06-08 | End: 2025-06-11 | Stop reason: HOSPADM

## 2025-06-08 RX ORDER — SODIUM CHLORIDE 9 MG/ML
INJECTION, SOLUTION INTRAVENOUS PRN
Status: DISCONTINUED | OUTPATIENT
Start: 2025-06-08 | End: 2025-06-11 | Stop reason: HOSPADM

## 2025-06-08 RX ORDER — SODIUM CHLORIDE 0.9 % (FLUSH) 0.9 %
5-40 SYRINGE (ML) INJECTION PRN
Status: DISCONTINUED | OUTPATIENT
Start: 2025-06-08 | End: 2025-06-11 | Stop reason: HOSPADM

## 2025-06-08 RX ADMIN — SUCRALFATE 1 G: 1 TABLET ORAL at 10:00

## 2025-06-08 RX ADMIN — HYDROXYZINE HYDROCHLORIDE 10 MG: 10 TABLET, FILM COATED ORAL at 10:01

## 2025-06-08 RX ADMIN — SUCRALFATE 1 G: 1 TABLET ORAL at 20:19

## 2025-06-08 RX ADMIN — FAMOTIDINE 20 MG: 20 TABLET, FILM COATED ORAL at 10:00

## 2025-06-08 RX ADMIN — MIRTAZAPINE 15 MG: 15 TABLET, FILM COATED ORAL at 20:19

## 2025-06-08 RX ADMIN — Medication 10 ML: at 20:20

## 2025-06-08 RX ADMIN — ONDANSETRON 4 MG: 4 TABLET, ORALLY DISINTEGRATING ORAL at 18:51

## 2025-06-08 RX ADMIN — MEROPENEM 1000 MG: 1 INJECTION INTRAVENOUS at 06:58

## 2025-06-08 RX ADMIN — PANTOPRAZOLE SODIUM 40 MG: 40 TABLET, DELAYED RELEASE ORAL at 20:20

## 2025-06-08 RX ADMIN — OXYCODONE 10 MG: 5 TABLET ORAL at 10:14

## 2025-06-08 RX ADMIN — CYANOCOBALAMIN TAB 1000 MCG 1000 MCG: 1000 TAB at 10:00

## 2025-06-08 RX ADMIN — Medication 2000 UNITS: at 10:01

## 2025-06-08 RX ADMIN — Medication 1 CAPSULE: at 10:00

## 2025-06-08 RX ADMIN — HYDROXYZINE HYDROCHLORIDE 10 MG: 10 TABLET, FILM COATED ORAL at 20:19

## 2025-06-08 RX ADMIN — SUCRALFATE 1 G: 1 TABLET ORAL at 13:50

## 2025-06-08 RX ADMIN — SERTRALINE 50 MG: 50 TABLET, FILM COATED ORAL at 10:01

## 2025-06-08 RX ADMIN — SUCRALFATE 1 G: 1 TABLET ORAL at 17:06

## 2025-06-08 RX ADMIN — Medication 5 ML: at 10:16

## 2025-06-08 RX ADMIN — ATORVASTATIN CALCIUM 40 MG: 40 TABLET, FILM COATED ORAL at 10:01

## 2025-06-08 RX ADMIN — HYDROXYZINE HYDROCHLORIDE 10 MG: 10 TABLET, FILM COATED ORAL at 13:50

## 2025-06-08 RX ADMIN — MEROPENEM 1000 MG: 1 INJECTION INTRAVENOUS at 18:50

## 2025-06-08 RX ADMIN — HEPARIN SODIUM 5000 UNITS: 5000 INJECTION INTRAVENOUS; SUBCUTANEOUS at 10:01

## 2025-06-08 RX ADMIN — PANTOPRAZOLE SODIUM 40 MG: 40 TABLET, DELAYED RELEASE ORAL at 10:01

## 2025-06-08 RX ADMIN — OXYCODONE 10 MG: 5 TABLET ORAL at 20:19

## 2025-06-08 RX ADMIN — ASPIRIN 81 MG: 81 TABLET, DELAYED RELEASE ORAL at 10:01

## 2025-06-08 RX ADMIN — METOPROLOL SUCCINATE 12.5 MG: 25 TABLET, EXTENDED RELEASE ORAL at 10:01

## 2025-06-08 RX ADMIN — MEGESTROL ACETATE 20 MG: 40 TABLET ORAL at 10:01

## 2025-06-08 RX ADMIN — HEPARIN SODIUM 5000 UNITS: 5000 INJECTION INTRAVENOUS; SUBCUTANEOUS at 20:18

## 2025-06-08 RX ADMIN — Medication 7.5 MG: at 20:18

## 2025-06-08 ASSESSMENT — PAIN SCALES - GENERAL
PAINLEVEL_OUTOF10: 6
PAINLEVEL_OUTOF10: 8

## 2025-06-08 ASSESSMENT — PAIN DESCRIPTION - LOCATION
LOCATION: ARM;HIP;LEG;SHOULDER
LOCATION: FLANK

## 2025-06-08 ASSESSMENT — PAIN DESCRIPTION - DESCRIPTORS
DESCRIPTORS: ACHING
DESCRIPTORS: ACHING;DISCOMFORT;NAGGING

## 2025-06-08 ASSESSMENT — PAIN DESCRIPTION - ORIENTATION
ORIENTATION: LEFT
ORIENTATION: LEFT

## 2025-06-08 NOTE — PROGRESS NOTES
NAME: Brigid Moses  MR:  [id number]  :   1970  Admit Date:  2025    Elements of this note, were copied and pasted from Previous. Updates have been made where noted and reflect current exam and medical decision making from the DOS of this encounter.  CHIEF COMPLAINT       Chief Complaint   Patient presents with    Flank Pain     Sent in by [facility] for staghorn kidney stone, needs transferred to [facility]     HISTORY OF PRESENT ILLNESS     Brigid Moses is a 55 y.o. female admitted on 2025    Patient Active Problem List   Diagnosis    Hammer toe, acquired    Anemia, unspecified    Dysphagia following cerebral infarction    Hemiplegia and hemiparesis following cerebral infarction affecting left non-dominant side (HCC)    Primary hypertension    Major depressive disorder, recurrent    Constipation    Metabolic acidosis    Acute kidney injury    Obstructive uropathy    Moderate protein-calorie malnutrition    Staghorn kidney stones    Complicated urinary tract infection    Gram negative sepsis (HCC)       This is a face to face encounter   25 afebrile eating has no c/o pollack clear   in bed nad  no family present     Admitted for   Staghorn calculus [N20.0]  Flank pain [R10.9]  Acute kidney injury [N17.9]  Urinary tract infection with hematuria, site unspecified [N39.0, R31.9]    ASSESSMENT/PLAN    Leukocytosis  Escherichia Coli bacteremia recurrent   H/o ESBL E coli 2025  Complicated Uti   Multiple bilateral nonobstructing renal calculi as well as   multiple calculi within the ureters.  Staghorn  Bilateral double-J ureteral stents  Gas noted in the left renal collecting system.    Mild right hydronephrosis.    Bilateral periureteral and mild perinephric fat stranding.   Willie        meropenem (MERREM) 1,000 mg in sodium chloride 0.9 % 100 mL IVPB (addEASE), Q12H can d/c with ertapenem   Midline  Spoke w ith Dr Walker      With staghorn calculi may need suppression after IV  til stones are  an Extended Spectrum Beta Lactamase (ESBL)  and resistance to therapy with penicillins, cephalosporins and aztreonam is expected. These organisms generally remain susceptible to carbapenems. Consider ID Consultation. CONTACT PRECAUTIONS INDICATED.      Culture, Urine [[id number]]  (Abnormal)  (Susceptibility) Collected: 06/04/25 1440    Order Status: Completed Specimen: Urine, clean catch Updated: 06/08/25 0631     Specimen Description .CLEAN CATCH URINE     Special Requests Site: Urine     Culture ESCHERICHIA COLI 50 ,000 CFU/ML      PSEUDOMONAS AERUGINOSA 10 to 50,000 CFU/ML      <10,000 CFU/ML PROTEUS SPECIES    Susceptibility        Escherichia coli      BACTERIAL SUSCEPTIBILITY PANEL DEAN      amikacin 4  Sensitive      ampicillin >=32  Resistant      ampicillin-sulbactam >=32  Resistant      aztreonam >=64  Resistant      ceFAZolin >=32  Resistant  [1]       cefepime >=32  Resistant      cefotaxime >=64  Resistant      cefOXitin 8  Sensitive      cefTAZidime >=32  Resistant      cefTAZidime-avibactam <=0.12  Sensitive      Ceftolozane/Tazobactam (Zerbaxa) 0.5  Sensitive      cefTRIAXone >=64  Resistant      Cefuroxime axetil >=64  Resistant      Cefuroxime-Sodium >=64  Resistant      ciprofloxacin >=4  Resistant      ertapenem <=0.12  Sensitive      imipenem <=0.25  Sensitive      Imipenem-Relebactam <=0.25  Sensitive      levofloxacin >=8  Resistant      meropenem <=0.25  Sensitive      Meropenem-vaborbactam <=0.5  Sensitive      nitrofurantoin <=16  Sensitive      piperacillin-tazobactam <=4  Sensitive      tobramycin <=1  Sensitive      trimethoprim-sulfamethoxazole <=20  Sensitive                   [1]  Cefazolin sensitivity results can be used to predict the effectiveness of oral   cephalosporins (eg. Cephalexin) in uncomplicated Urinary Tract Infections due to E. coli, K.   pneumoniae, and P. mirabilis                  Susceptibility        Pseudomonas aeruginosa      BACTERIAL  bilateral renal and ureteral calculi with bilateral double-J ureteral stents in place. Gas noted in the left renal collecting system. Mild right hydronephrosis.       Imaging and labs were reviewed per medical records.     Thank you for involving me in the care of Brigid Moses I will continue to follow. Please do not hesitate to call [phone] for any questions or concerns.    Electronically signed by Ana Rosa Mcnulty MD on 6/8/2025 at 10:42 AM

## 2025-06-08 NOTE — PROGRESS NOTES
[facility] Hospitalist   Progress Note    Admitting Date and Time: 6/4/2025  2:07 PM  Admit Dx: Staghorn calculus [N20.0]  Flank pain [R10.9]  Acute kidney injury [N17.9]  Urinary tract infection with hematuria, site unspecified [N39.0, R31.9]    Subjective/interval history:    6/5: Pt was admitted with recurrent urinary tract infection yesterday associated with chronic bilateral staghorn calculi.    At this time she is awake, alert, feels much better today.  Pain is well-controlled.    6/6: Patient continues to gradually feel better.  Tolerating diet.  Tolerating antimicrobials.    6/7: Patient is awake, alert, no new complaints.  Feeling gradually better.    6/8: Patient feels well, no complaints.    ROS: denies fever, chills, cp, sob, n/v, HA unless stated above.     sodium chloride flush  5-40 mL IntraVENous 2 times per day    lidocaine 1 % injection  50 mg IntraDERmal Once    famotidine  20 mg Oral Daily    meropenem  1,000 mg IntraVENous Q12H    atorvastatin  40 mg Oral Daily    aspirin  81 mg Oral Daily    Vitamin D  2,000 Units Oral Daily    [Held by provider] ferrous sulfate  325 mg Oral Daily with breakfast    hydrOXYzine HCl  10 mg Oral TID    lactobacillus  1 capsule Oral Daily    [Held by provider] linaCLOtide  72 mcg Oral QAM AC    [Held by provider] lisinopril  2.5 mg Oral Daily    megestrol  20 mg Oral Daily    melatonin  7.5 mg Oral Nightly    metoprolol succinate  12.5 mg Oral Daily    mirtazapine  15 mg Oral Nightly    pantoprazole  40 mg Oral BID    sertraline  50 mg Oral Daily    sucralfate  1 g Oral 4x Daily    vitamin B-12  1,000 mcg Oral Daily    sodium chloride flush  5-40 mL IntraVENous 2 times per day    heparin (porcine)  5,000 Units SubCUTAneous BID     sodium chloride flush, 5-40 mL, PRN  sodium chloride, , PRN  bisacodyl, 10 mg, Daily PRN  calcium carbonate, 2 tablet, Q12H PRN  docusate sodium, 100 mg, BID PRN  [Held by provider] magnesium hydroxide, 30 mL, Daily

## 2025-06-09 LAB
ANION GAP SERPL CALCULATED.3IONS-SCNC: 10 MMOL/L (ref 7–16)
BUN SERPL-MCNC: 20 MG/DL (ref 6–20)
CALCIUM SERPL-MCNC: 8.8 MG/DL (ref 8.6–10.2)
CHLORIDE SERPL-SCNC: 109 MMOL/L (ref 98–107)
CO2 SERPL-SCNC: 24 MMOL/L (ref 22–29)
CREAT SERPL-MCNC: 1.5 MG/DL (ref 0.5–1)
ERYTHROCYTE [DISTWIDTH] IN BLOOD BY AUTOMATED COUNT: 17.5 % (ref 11.5–15)
GFR, ESTIMATED: 40 ML/MIN/1.73M2
GLUCOSE SERPL-MCNC: 87 MG/DL (ref 74–99)
HCT VFR BLD AUTO: 28.3 % (ref 34–48)
HGB BLD-MCNC: 8.6 G/DL (ref 11.5–15.5)
MCH RBC QN AUTO: 26.7 PG (ref 26–35)
MCHC RBC AUTO-ENTMCNC: 30.4 G/DL (ref 32–34.5)
MCV RBC AUTO: 87.9 FL (ref 80–99.9)
PLATELET # BLD AUTO: 437 K/UL (ref 130–450)
PMV BLD AUTO: 9.9 FL (ref 7–12)
POTASSIUM SERPL-SCNC: 4.2 MMOL/L (ref 3.5–5)
RBC # BLD AUTO: 3.22 M/UL (ref 3.5–5.5)
SODIUM SERPL-SCNC: 143 MMOL/L (ref 132–146)
WBC OTHER # BLD: 7.7 K/UL (ref 4.5–11.5)

## 2025-06-09 PROCEDURE — 97161 PT EVAL LOW COMPLEX 20 MIN: CPT | Performed by: PHYSICAL THERAPIST

## 2025-06-09 PROCEDURE — 6360000002 HC RX W HCPCS: Performed by: INTERNAL MEDICINE

## 2025-06-09 PROCEDURE — 6370000000 HC RX 637 (ALT 250 FOR IP): Performed by: INTERNAL MEDICINE

## 2025-06-09 PROCEDURE — 99232 SBSQ HOSP IP/OBS MODERATE 35: CPT | Performed by: INTERNAL MEDICINE

## 2025-06-09 PROCEDURE — 85027 COMPLETE CBC AUTOMATED: CPT

## 2025-06-09 PROCEDURE — 36415 COLL VENOUS BLD VENIPUNCTURE: CPT

## 2025-06-09 PROCEDURE — 97165 OT EVAL LOW COMPLEX 30 MIN: CPT

## 2025-06-09 PROCEDURE — 1200000000 HC SEMI PRIVATE

## 2025-06-09 PROCEDURE — 80048 BASIC METABOLIC PNL TOTAL CA: CPT

## 2025-06-09 PROCEDURE — 2500000003 HC RX 250 WO HCPCS: Performed by: INTERNAL MEDICINE

## 2025-06-09 PROCEDURE — 2580000003 HC RX 258: Performed by: INTERNAL MEDICINE

## 2025-06-09 RX ADMIN — HYDROXYZINE HYDROCHLORIDE 10 MG: 10 TABLET, FILM COATED ORAL at 21:20

## 2025-06-09 RX ADMIN — Medication 10 ML: at 21:34

## 2025-06-09 RX ADMIN — OXYCODONE 10 MG: 5 TABLET ORAL at 18:39

## 2025-06-09 RX ADMIN — SERTRALINE 50 MG: 50 TABLET, FILM COATED ORAL at 10:13

## 2025-06-09 RX ADMIN — SUCRALFATE 1 G: 1 TABLET ORAL at 21:20

## 2025-06-09 RX ADMIN — FERROUS SULFATE TAB 325 MG (65 MG ELEMENTAL FE) 325 MG: 325 (65 FE) TAB at 10:12

## 2025-06-09 RX ADMIN — OXYCODONE 10 MG: 5 TABLET ORAL at 13:18

## 2025-06-09 RX ADMIN — Medication 2000 UNITS: at 10:12

## 2025-06-09 RX ADMIN — MEGESTROL ACETATE 20 MG: 40 TABLET ORAL at 10:11

## 2025-06-09 RX ADMIN — Medication 10 ML: at 10:14

## 2025-06-09 RX ADMIN — ATORVASTATIN CALCIUM 40 MG: 40 TABLET, FILM COATED ORAL at 10:12

## 2025-06-09 RX ADMIN — HYDROXYZINE HYDROCHLORIDE 10 MG: 10 TABLET, FILM COATED ORAL at 16:02

## 2025-06-09 RX ADMIN — HEPARIN SODIUM 5000 UNITS: 5000 INJECTION INTRAVENOUS; SUBCUTANEOUS at 10:13

## 2025-06-09 RX ADMIN — Medication 7.5 MG: at 21:20

## 2025-06-09 RX ADMIN — PANTOPRAZOLE SODIUM 40 MG: 40 TABLET, DELAYED RELEASE ORAL at 21:20

## 2025-06-09 RX ADMIN — Medication 1 CAPSULE: at 10:13

## 2025-06-09 RX ADMIN — FAMOTIDINE 20 MG: 20 TABLET, FILM COATED ORAL at 10:12

## 2025-06-09 RX ADMIN — OXYCODONE 10 MG: 5 TABLET ORAL at 21:24

## 2025-06-09 RX ADMIN — HEPARIN SODIUM 5000 UNITS: 5000 INJECTION INTRAVENOUS; SUBCUTANEOUS at 21:32

## 2025-06-09 RX ADMIN — HYDROXYZINE HYDROCHLORIDE 10 MG: 10 TABLET, FILM COATED ORAL at 10:12

## 2025-06-09 RX ADMIN — MEROPENEM 1000 MG: 1 INJECTION INTRAVENOUS at 07:05

## 2025-06-09 RX ADMIN — SUCRALFATE 1 G: 1 TABLET ORAL at 17:36

## 2025-06-09 RX ADMIN — SUCRALFATE 1 G: 1 TABLET ORAL at 13:18

## 2025-06-09 RX ADMIN — MIRTAZAPINE 15 MG: 15 TABLET, FILM COATED ORAL at 21:25

## 2025-06-09 RX ADMIN — PANTOPRAZOLE SODIUM 40 MG: 40 TABLET, DELAYED RELEASE ORAL at 10:12

## 2025-06-09 RX ADMIN — CYANOCOBALAMIN TAB 1000 MCG 1000 MCG: 1000 TAB at 10:12

## 2025-06-09 RX ADMIN — Medication 10 ML: at 10:15

## 2025-06-09 RX ADMIN — METOPROLOL SUCCINATE 12.5 MG: 25 TABLET, EXTENDED RELEASE ORAL at 10:13

## 2025-06-09 RX ADMIN — ASPIRIN 81 MG: 81 TABLET, DELAYED RELEASE ORAL at 10:13

## 2025-06-09 RX ADMIN — MEROPENEM 1000 MG: 1 INJECTION INTRAVENOUS at 18:45

## 2025-06-09 RX ADMIN — SUCRALFATE 1 G: 1 TABLET ORAL at 10:13

## 2025-06-09 ASSESSMENT — PAIN DESCRIPTION - ORIENTATION
ORIENTATION: RIGHT;LEFT
ORIENTATION: RIGHT;LEFT

## 2025-06-09 ASSESSMENT — PAIN DESCRIPTION - LOCATION
LOCATION: ABDOMEN;FLANK;BACK
LOCATION: ABDOMEN;FLANK;RIB CAGE

## 2025-06-09 ASSESSMENT — PAIN SCALES - GENERAL
PAINLEVEL_OUTOF10: 7
PAINLEVEL_OUTOF10: 8
PAINLEVEL_OUTOF10: 8

## 2025-06-09 ASSESSMENT — PAIN DESCRIPTION - DESCRIPTORS
DESCRIPTORS: ACHING;DISCOMFORT;SPASM;CRAMPING
DESCRIPTORS: ACHING;DISCOMFORT;CRAMPING;SPASM
DESCRIPTORS: ACHING;CRAMPING;DISCOMFORT

## 2025-06-09 NOTE — PROGRESS NOTES
OCCUPATIONAL THERAPY INITIAL EVALUATION    [facility]  [address] [place]        Date:2025                                                  Patient Name: Brigid Moses    MRN: [id number]    : 1970    Room: [address] [place] [address]      Evaluating OT: Pietro King OTR/L; #[id number]     Referring Provider and Specific Provider Orders/Date:      25  OT eval and treat  Start:  25,   End:  25,   ONE TIME,   Standing Count:  1 Occurrences,   R         Wili Walekr DO      Placement Recommendation: SNF       Diagnosis:   1. Flank pain    2. Urinary tract infection with hematuria, site unspecified    3. Staghorn calculus         Surgery: None      Pertinent Medical History:       Past Medical History:   Diagnosis Date    Altered mental status, unspecified     Anemia, iron deficiency     Anemia, unspecified     Cerebral artery occlusion with cerebral infarction (HCC)     Demand ischemia (HCC) 2025    Dysphagia following cerebral infarction     Gastroesophageal reflux disease     without esophagitis    Hemiplegia and hemiparesis following cerebral infarction affecting left non-dominant side (HCC)     Hypertension     Intestinal malabsorption, unspecified     Major depressive disorder, recurrent     Major depressive disorder, recurrent 2022    Panic attacks     Sepsis due to Escherichia coli with acute renal failure without septic shock (HCC) 2025    Sepsis due to urinary tract infection (HCC) 2025    Staghorn kidney stones 2025         Past Surgical History:   Procedure Laterality Date    BLADDER SURGERY Bilateral 2022    CYSTOSCOPY RETROGRADE PYELOGRAM BILATERAL  STENT INSERTION performed by Benedicto Eugene DO at [facility] OR    BLADDER SURGERY Bilateral 2022    CYSTOSCOPY RETROGRADE PYELOGRAM,  URETEROSCOPY,  LASER LITHOTRIPSY - BILATERAL, WITH BILATERAL STENT EXCHANGE performed by Prince BRIDGES

## 2025-06-09 NOTE — PROGRESS NOTES
NAME: Brigid Moses  MR:  [id number]  :   1970  Admit Date:  2025    Elements of this note, were copied and pasted from Previous. Updates have been made where noted and reflect current exam and medical decision making from the DOS of this encounter.  Face to face encounter   CHIEF COMPLAINT       Chief Complaint   Patient presents with    Flank Pain     [facility] for staghorn kidney stone, needs transferred to F     HISTORY OF PRESENT ILLNESS     Brigid Moses is a 55 y.o. female admitted on 2025    Patient Active Problem List   Diagnosis    Hammer toe, acquired    Anemia, unspecified    Dysphagia following cerebral infarction    Hemiplegia and hemiparesis following cerebral infarction affecting left non-dominant side (HCC)    Primary hypertension    Major depressive disorder, recurrent    Constipation    Metabolic acidosis    Acute kidney injury    Obstructive uropathy    Moderate protein-calorie malnutrition    Staghorn kidney stones    Complicated urinary tract infection    Gram negative sepsis (HCC)       This is a face to face encounter   25-patient is in bed- tired today pale. Thin. Has been afebrile. On room air.      2025- afebrile eating has no c/o pollack clear   in bed nad  no family present     Admitted for   Staghorn calculus [N20.0]  Flank pain [R10.9]  Acute kidney injury [N17.9]  Urinary tract infection with hematuria, site unspecified [N39.0, R31.9]    ASSESSMENT  Leukocytosis  Escherichia Coli bacteremia recurrent   H/o ESBL E coli 2025  Complicated Uti   Multiple bilateral nonobstructing renal calculi as well as   multiple calculi within the ureters.  Staghorn  Bilateral double-J ureteral stents  Gas noted in the left renal collecting system.    Mild right hydronephrosis.    Bilateral periureteral and mild perinephric fat stranding.   Willie    Plan:  Discussed with Dr. Mcnulty   Currently on Meropenem IV  Will discharge with Invanz- 1 gram IV every 24  Oral Daily    mirtazapine  15 mg Oral Nightly    pantoprazole  40 mg Oral BID    sertraline  50 mg Oral Daily    sucralfate  1 g Oral 4x Daily    vitamin B-12  1,000 mcg Oral Daily    sodium chloride flush  5-40 mL IntraVENous 2 times per day    heparin (porcine)  5,000 Units SubCUTAneous BID      sodium chloride      sodium chloride       sodium chloride flush, sodium chloride, bisacodyl, calcium carbonate, docusate sodium, [Held by provider] magnesium hydroxide, sodium chloride flush, sodium chloride, ondansetron **OR** ondansetron, polyethylene glycol, acetaminophen **OR** acetaminophen, oxyCODONE **OR** oxyCODONE, fentanNYL  DIAGNOSTIC RESULTS     Results       Procedure Component Value Units Date/Time    Culture, Blood 1 [[id number]]  (Abnormal)  (Susceptibility) Collected: 06/04/25 1528    Order Status: Completed Specimen: Blood Updated: 06/07/25 0623     Specimen Description .BLOOD     Special Requests          [employer] test comment AEROBIC BLD BOTTLE     Direct Exam DIRECT GRAM STAIN FROM BOTTLE: GRAM NEGATIVE RODS     Comment: Direct Gram Stain from bottle and Polymerase Chain Reaction (PCR) results called to and read   back by: WILVER SAL RN 6/5/25 AT 0838           Culture ESCHERICHIA COLI ESBL This organism is an Extended Spectrum Beta Lactamase (ESBL)  and resistance to therapy with penicillins, cephalosporins and aztreonam is expected. These organisms generally remain susceptible to carbapenems. Consider ID Consultation. CONTACT PRECAUTIONS INDICATED.       Candida auris Not Detected     Candida albicans Not Detected     Candida glabrata Not Detected     Candida krusei Not Detected     Candida parapsilosis Not Detected     Candida tropicalis Not Detected     Cryptococcus neoformans/gattii Not Detected     Acinetobacter calcoaceticus-baumannii complex Not Detected     Escherichia Coli DETECTED     Enterobacter Cloacae Complex Not Detected     Enterobacterales DETECTED     Klebsiella oxytoca Not

## 2025-06-09 NOTE — CARE COORDINATION
Ss note: 6/9/2025 3:01 PM Pt in isolation. LTC bedhold at [place] can return precert pending. ID is consulted, for midline, need end date for IV abx. PTA pt is bedbound. Plan is to return to [place] at discharge. Need signed ENRIQUE. KAROLINA Castellanos

## 2025-06-09 NOTE — PROGRESS NOTES
Progress  Note  Brigid Moses  [id number]  1970  0433/0433-02    Chief Complaint   Patient presents with    Flank Pain     Sent in by [facility] for staghorn kidney stone, needs transferred to F     Historical Issues:    Principal Problem:    Acute kidney injury  Active Problems:    Primary hypertension    Major depressive disorder, recurrent    Metabolic acidosis    Moderate protein-calorie malnutrition    Staghorn kidney stones    Complicated urinary tract infection    Gram negative sepsis (HCC)  Resolved Problems:    * No resolved hospital problems. *    Current Facility-Administered Medications   Medication Dose Route Frequency Provider Last Rate Last Admin    sodium chloride flush 0.9 % injection 5-40 mL  5-40 mL IntraVENous 2 times per day Wili Walker DO   10 mL at 06/08/25 2020    sodium chloride flush 0.9 % injection 5-40 mL  5-40 mL IntraVENous PRN iWli Walker DO        0.9 % sodium chloride infusion   IntraVENous PRN Wili Walker DO        lidocaine 1 % injection 50 mg  50 mg IntraDERmal Once Wili Walker DO        famotidine (PEPCID) tablet 20 mg  20 mg Oral Daily Wili Walker DO   20 mg at 06/08/25 1000    meropenem (MERREM) 1,000 mg in sodium chloride 0.9 % 100 mL IVPB (addEASE)  1,000 mg IntraVENous Q12H Wili Walker DO 33.3 mL/hr at 06/09/25 0705 1,000 mg at 06/09/25 0705    atorvastatin (LIPITOR) tablet 40 mg  40 mg Oral Daily Wili Walker DO   40 mg at 06/08/25 1001    aspirin EC tablet 81 mg  81 mg Oral Daily Wili Walker DO   81 mg at 06/08/25 1001    bisacodyl (DULCOLAX) suppository 10 mg  10 mg Rectal Daily PRN Wili Walker DO        calcium carbonate (TUMS) chewable tablet 1,000 mg  2 tablet Oral Q12H PRN Wili Walker DO        Vitamin D (CHOLECALCIFEROL) tablet 2,000 Units  2,000 Units Oral Daily Wili Walker DO   2,000 Units at 06/08/25 1001    docusate sodium (COLACE) capsule 100 mg  100 mg Oral BID  PRN Wili Walker DO        ferrous sulfate (IRON 325) tablet 325 mg  325 mg Oral Daily with breakfast Wili Walker DO        hydrOXYzine HCl (ATARAX) tablet 10 mg  10 mg Oral TID Wili Walker DO   10 mg at 06/08/25 2019    lactobacillus (CULTURELLE) capsule 1 capsule  1 capsule Oral Daily Wili Walker DO   1 capsule at 06/08/25 1000    linaCLOtide (LINZESS) capsule 72 mcg (Patient Supplied)  72 mcg Oral QAM AC Wili Walker DO        [Held by provider] lisinopril (PRINIVIL;ZESTRIL) tablet 2.5 mg  2.5 mg Oral Daily Wili Walker DO        [Held by provider] magnesium hydroxide (MILK OF MAGNESIA) 400 MG/5ML suspension 30 mL  30 mL Oral Daily PRN Wili Walker DO        megestrol (MEGACE) tablet 20 mg  20 mg Oral Daily Wili Walker DO   20 mg at 06/08/25 1001    melatonin disintegrating tablet 7.5 mg  7.5 mg Oral Nightly Wili Walker DO   7.5 mg at 06/08/25 2018    metoprolol succinate (TOPROL XL) extended release tablet 12.5 mg  12.5 mg Oral Daily Wili Walker DO   12.5 mg at 06/08/25 1001    mirtazapine (REMERON) tablet 15 mg  15 mg Oral Nightly Wili Walker DO   15 mg at 06/08/25 2019    pantoprazole (PROTONIX) tablet 40 mg  40 mg Oral BID Wili Walker DO   40 mg at 06/08/25 2020    sertraline (ZOLOFT) tablet 50 mg  50 mg Oral Daily Wili Walker DO   50 mg at 06/08/25 1001    sucralfate (CARAFATE) tablet 1 g  1 g Oral 4x Daily Wili Walker DO   1 g at 06/08/25 2019    vitamin B-12 (CYANOCOBALAMIN) tablet 1,000 mcg  1,000 mcg Oral Daily Wili Walker DO   1,000 mcg at 06/08/25 1000    sodium chloride flush 0.9 % injection 5-40 mL  5-40 mL IntraVENous 2 times per day Wili Walker DO   5 mL at 06/08/25 1016    sodium chloride flush 0.9 % injection 5-40 mL  5-40 mL IntraVENous PRN Wili Walker DO        0.9 % sodium chloride infusion   IntraVENous PRN Wili Walker DO        heparin (porcine)  30.4*   RDW 18.0* 17.5*    437   MPV 9.5 9.9               Assessment/Plan:  Staghorn Calculi with ESBL & Pseudomonas Complicated UTI  Long-term antibiotic suppression until stone removal  ID consultation  Urology consultation  S/P CVA with left sided contractures  SNF Resident  Tarditive Dyskinesia  Avoid antipsychotic medicine  Chronic Major Depression continue current antidepressants  Anemia Chronic Disease monitor  CKD  Hypotension      Case Discussed with:      Electronically signed by Adelfo Elizalde MD on 6/9/2025 at 8:58 AM

## 2025-06-09 NOTE — PROGRESS NOTES
Physical Therapy Initial Evaluation/Plan of Care    Room #:  0433/0433-02  Patient Name: Brigid Moses  YOB: 1970  MRN: [id number]    Date of Service: 6/9/2025     Tentative placement recommendation: Skilled Nursing Facility   Equipment recommendation: Equipment at Nursing Home      Evaluating Physical Therapist: Parveen Yung PT  #[id number]      Specific Provider Orders/Date/Referring Provider :  PT eval and treat  Start:  06/05/25 0845,   End:  06/05/25 0845,   ONE TIME,   Standing Count:  1 Occurrences,   R       Wili Walker DO    Admitting Diagnosis:   Staghorn calculus [N20.0]  Flank pain [R10.9]  Acute kidney injury [N17.9]  Urinary tract infection with hematuria, site unspecified [N39.0, R31.9]    Admitted with    above  Surgery: none  Visit Diagnoses         Codes      Flank pain    -  Primary R10.9      Urinary tract infection with hematuria, site unspecified     N39.0, R31.9      Staghorn calculus     N20.0            Patient Active Problem List   Diagnosis    Hammer toe, acquired    Anemia, unspecified    Dysphagia following cerebral infarction    Hemiplegia and hemiparesis following cerebral infarction affecting left non-dominant side (HCC)    Primary hypertension    Major depressive disorder, recurrent    Constipation    Metabolic acidosis    Acute kidney injury    Obstructive uropathy    Moderate protein-calorie malnutrition    Staghorn kidney stones    Complicated urinary tract infection    Gram negative sepsis (HCC)        ASSESSMENT of Current Deficits Patient exhibits decreased strength, range of motion, and pain with movement  impairing functional mobility and tolerance to activity . Pt does not want to participate in therapy this visit and only consented to limited rom       PHYSICAL THERAPY  PLAN OF CARE       Physical therapy plan of care is established based on physician order,  patient diagnosis and clinical assessment            Prior Level of Function: Patient bed  importance of mobility while in hospital      Patient response to education:   Pt verbalized understanding Pt demonstrated skill Pt requires further education in this area   No No Yes             At end of session, patient in bed with     call light and phone within reach,  all lines and tubes intact, nursing notified.      Patient would benefit from continued skilled Physical Therapy to improve functional independence and quality of life.         Patient's/ family goals   none stated    Time in  1215  Time out  1225    Total Treatment Time  0 minutes    Evaluation time includes thorough review of current medical information, gathering information on past medical history/social history and prior level of function, completion of standardized testing/informal observation of tasks, assessment of data, and development of Plan of care and goals.     CPT codes:  Low Complexity PT evaluation (95716)  No treatment    Parveen Yung, PT

## 2025-06-09 NOTE — PROCEDURES
PROCEDURE NOTE  Date: 6/9/2025   Name: Brigid Moses  YOB: 1970    Procedures        Attempted midline, due to poor vascular access unable to place midline. Recommend IR to place line. Patient would be a good candidate for mediport, IV team has been consulate multiple times due to poor vascularity and being limited to on left arm.

## 2025-06-10 ENCOUNTER — APPOINTMENT (OUTPATIENT)
Dept: INTERVENTIONAL RADIOLOGY/VASCULAR | Age: 55
DRG: 690 | End: 2025-06-10
Payer: COMMERCIAL

## 2025-06-10 LAB
INR PPP: 1.3
PROTHROMBIN TIME: 13.6 SEC (ref 9.3–12.4)

## 2025-06-10 PROCEDURE — 2500000003 HC RX 250 WO HCPCS: Performed by: INTERNAL MEDICINE

## 2025-06-10 PROCEDURE — C1751 CATH, INF, PER/CENT/MIDLINE: HCPCS

## 2025-06-10 PROCEDURE — 76937 US GUIDE VASCULAR ACCESS: CPT

## 2025-06-10 PROCEDURE — 99231 SBSQ HOSP IP/OBS SF/LOW 25: CPT | Performed by: INTERNAL MEDICINE

## 2025-06-10 PROCEDURE — 6360000002 HC RX W HCPCS: Performed by: INTERNAL MEDICINE

## 2025-06-10 PROCEDURE — 85610 PROTHROMBIN TIME: CPT

## 2025-06-10 PROCEDURE — 6370000000 HC RX 637 (ALT 250 FOR IP): Performed by: INTERNAL MEDICINE

## 2025-06-10 PROCEDURE — 2580000003 HC RX 258: Performed by: INTERNAL MEDICINE

## 2025-06-10 PROCEDURE — 36415 COLL VENOUS BLD VENIPUNCTURE: CPT

## 2025-06-10 PROCEDURE — 6360000002 HC RX W HCPCS: Performed by: SPECIALIST

## 2025-06-10 PROCEDURE — 2580000003 HC RX 258: Performed by: SPECIALIST

## 2025-06-10 PROCEDURE — 6360000004 HC RX CONTRAST MEDICATION: Performed by: RADIOLOGY

## 2025-06-10 PROCEDURE — 1200000000 HC SEMI PRIVATE

## 2025-06-10 PROCEDURE — 05H933Z INSERTION OF INFUSION DEVICE INTO RIGHT BRACHIAL VEIN, PERCUTANEOUS APPROACH: ICD-10-PCS | Performed by: INTERNAL MEDICINE

## 2025-06-10 RX ORDER — IOPAMIDOL 408 MG/ML
5 INJECTION, SOLUTION INTRATHECAL
Status: COMPLETED | OUTPATIENT
Start: 2025-06-10 | End: 2025-06-10

## 2025-06-10 RX ORDER — IOPAMIDOL 612 MG/ML
5 INJECTION, SOLUTION INTRATHECAL
Status: COMPLETED | OUTPATIENT
Start: 2025-06-10 | End: 2025-06-10

## 2025-06-10 RX ADMIN — Medication 10 ML: at 21:55

## 2025-06-10 RX ADMIN — Medication 1 CAPSULE: at 09:48

## 2025-06-10 RX ADMIN — MIRTAZAPINE 15 MG: 15 TABLET, FILM COATED ORAL at 21:54

## 2025-06-10 RX ADMIN — PANTOPRAZOLE SODIUM 40 MG: 40 TABLET, DELAYED RELEASE ORAL at 09:48

## 2025-06-10 RX ADMIN — ATORVASTATIN CALCIUM 40 MG: 40 TABLET, FILM COATED ORAL at 09:47

## 2025-06-10 RX ADMIN — IOPAMIDOL 5 ML: 612 INJECTION, SOLUTION INTRATHECAL at 15:10

## 2025-06-10 RX ADMIN — HYDROXYZINE HYDROCHLORIDE 10 MG: 10 TABLET, FILM COATED ORAL at 21:54

## 2025-06-10 RX ADMIN — OXYCODONE 10 MG: 5 TABLET ORAL at 10:25

## 2025-06-10 RX ADMIN — FENTANYL CITRATE 50 MCG: 0.05 INJECTION, SOLUTION INTRAMUSCULAR; INTRAVENOUS at 06:03

## 2025-06-10 RX ADMIN — MEROPENEM 1000 MG: 1 INJECTION INTRAVENOUS at 18:56

## 2025-06-10 RX ADMIN — HEPARIN SODIUM 5000 UNITS: 5000 INJECTION INTRAVENOUS; SUBCUTANEOUS at 09:48

## 2025-06-10 RX ADMIN — Medication 10 ML: at 09:48

## 2025-06-10 RX ADMIN — SERTRALINE 50 MG: 50 TABLET, FILM COATED ORAL at 09:46

## 2025-06-10 RX ADMIN — MEROPENEM 1000 MG: 1 INJECTION INTRAVENOUS at 06:08

## 2025-06-10 RX ADMIN — PANTOPRAZOLE SODIUM 40 MG: 40 TABLET, DELAYED RELEASE ORAL at 21:54

## 2025-06-10 RX ADMIN — FAMOTIDINE 20 MG: 20 TABLET, FILM COATED ORAL at 09:47

## 2025-06-10 RX ADMIN — SUCRALFATE 1 G: 1 TABLET ORAL at 21:54

## 2025-06-10 RX ADMIN — SUCRALFATE 1 G: 1 TABLET ORAL at 12:57

## 2025-06-10 RX ADMIN — SUCRALFATE 1 G: 1 TABLET ORAL at 09:44

## 2025-06-10 RX ADMIN — FERROUS SULFATE TAB 325 MG (65 MG ELEMENTAL FE) 325 MG: 325 (65 FE) TAB at 09:47

## 2025-06-10 RX ADMIN — HYDROXYZINE HYDROCHLORIDE 10 MG: 10 TABLET, FILM COATED ORAL at 09:44

## 2025-06-10 RX ADMIN — SUCRALFATE 1 G: 1 TABLET ORAL at 17:48

## 2025-06-10 RX ADMIN — HYDROXYZINE HYDROCHLORIDE 10 MG: 10 TABLET, FILM COATED ORAL at 15:40

## 2025-06-10 RX ADMIN — Medication 2000 UNITS: at 09:46

## 2025-06-10 RX ADMIN — MEGESTROL ACETATE 20 MG: 40 TABLET ORAL at 09:44

## 2025-06-10 RX ADMIN — HEPARIN SODIUM 5000 UNITS: 5000 INJECTION INTRAVENOUS; SUBCUTANEOUS at 21:55

## 2025-06-10 RX ADMIN — ASPIRIN 81 MG: 81 TABLET, DELAYED RELEASE ORAL at 09:48

## 2025-06-10 RX ADMIN — CYANOCOBALAMIN TAB 1000 MCG 1000 MCG: 1000 TAB at 09:48

## 2025-06-10 RX ADMIN — FENTANYL CITRATE 50 MCG: 0.05 INJECTION, SOLUTION INTRAMUSCULAR; INTRAVENOUS at 22:11

## 2025-06-10 RX ADMIN — OXYCODONE 10 MG: 5 TABLET ORAL at 18:04

## 2025-06-10 RX ADMIN — IOPAMIDOL 5 ML: 408 INJECTION, SOLUTION INTRATHECAL at 15:13

## 2025-06-10 RX ADMIN — Medication 7.5 MG: at 21:54

## 2025-06-10 ASSESSMENT — PAIN DESCRIPTION - PAIN TYPE: TYPE: CHRONIC PAIN

## 2025-06-10 ASSESSMENT — PAIN DESCRIPTION - DESCRIPTORS
DESCRIPTORS: ACHING;CRAMPING;DISCOMFORT
DESCRIPTORS: CRAMPING

## 2025-06-10 ASSESSMENT — PAIN SCALES - WONG BAKER: WONGBAKER_NUMERICALRESPONSE: HURTS A LITTLE BIT

## 2025-06-10 ASSESSMENT — PAIN DESCRIPTION - LOCATION
LOCATION: FLANK
LOCATION: ABDOMEN;FLANK;BACK
LOCATION: ABDOMEN
LOCATION: FLANK

## 2025-06-10 ASSESSMENT — PAIN SCALES - GENERAL
PAINLEVEL_OUTOF10: 2
PAINLEVEL_OUTOF10: 7
PAINLEVEL_OUTOF10: 7
PAINLEVEL_OUTOF10: 8
PAINLEVEL_OUTOF10: 7
PAINLEVEL_OUTOF10: 8
PAINLEVEL_OUTOF10: 7

## 2025-06-10 ASSESSMENT — PAIN DESCRIPTION - ORIENTATION
ORIENTATION: RIGHT;LEFT

## 2025-06-10 NOTE — PROGRESS NOTES
Patient came in to special procedures for PICC line placement, ordered by Dr. Elizalde     Procedure explained,  Questions answered consent signed.  Patient was educated about the amount of radiation used with today's procedure.     Unable to place PICC in Right arm due to vascular disease, Left arm contracted from CVA. Per Dr. Marcano, Pt could have subclavian line placed by surgery in Subclavian or IJ in specials.      Patient returned to Floor, Report given to  RN.

## 2025-06-10 NOTE — PROGRESS NOTES
Dr. Mcnulty aware that IR could not get PICC line in. PICC Team on floor and will attempt to place an extended dwell.

## 2025-06-10 NOTE — PROGRESS NOTES
Progress  Note  Brigid Moses  [id number]  1970  0435/0435-01    Chief Complaint   Patient presents with    Flank Pain     Sent in by [facility] for staghorn kidney stone, needs transferred to F     Historical Issues:    Principal Problem:    Acute kidney injury  Active Problems:    Primary hypertension    Major depressive disorder, recurrent    Metabolic acidosis    Moderate protein-calorie malnutrition    Staghorn kidney stones    Complicated urinary tract infection    Gram negative sepsis (HCC)  Resolved Problems:    * No resolved hospital problems. *    Current Facility-Administered Medications   Medication Dose Route Frequency Provider Last Rate Last Admin    sodium chloride flush 0.9 % injection 5-40 mL  5-40 mL IntraVENous 2 times per day Wili Walker DO   10 mL at 06/10/25 0948    sodium chloride flush 0.9 % injection 5-40 mL  5-40 mL IntraVENous PRN Wili Walker DO        0.9 % sodium chloride infusion   IntraVENous PRN Wili Walker DO        lidocaine 1 % injection 50 mg  50 mg IntraDERmal Once Wili Walker DO        famotidine (PEPCID) tablet 20 mg  20 mg Oral Daily Wili Walker DO   20 mg at 06/10/25 0947    meropenem (MERREM) 1,000 mg in sodium chloride 0.9 % 100 mL IVPB (addEASE)  1,000 mg IntraVENous Q12H Wili Walker DO   Stopped at 06/10/25 0910    atorvastatin (LIPITOR) tablet 40 mg  40 mg Oral Daily Wili Walker DO   40 mg at 06/10/25 0947    aspirin EC tablet 81 mg  81 mg Oral Daily Wili Walker DO   81 mg at 06/10/25 0948    bisacodyl (DULCOLAX) suppository 10 mg  10 mg Rectal Daily PRN Wili Walker DO        calcium carbonate (TUMS) chewable tablet 1,000 mg  2 tablet Oral Q12H PRN Wili Walker DO        Vitamin D (CHOLECALCIFEROL) tablet 2,000 Units  2,000 Units Oral Daily Wili Walker DO   2,000 Units at 06/10/25 0946    docusate sodium (COLACE) capsule 100 mg  100 mg Oral BID PRN Wili Walker DO

## 2025-06-10 NOTE — CARE COORDINATION
Ss note: 6/10/2025 10:00 AM Pt in isolation. Presents from [facility], LTC bedhold, is bed bound. ID consulted, awaiting line, IV invancz end date 6/30/25. Per liaison PRECERT has been obtained. Will await discharge order to arrange transfer back to [facility], will need signed ENRIQUE. KAROLINA Castellanos

## 2025-06-10 NOTE — PLAN OF CARE
Problem: Chronic Conditions and Co-morbidities  Goal: Patient's chronic conditions and co-morbidity symptoms are monitored and maintained or improved  Outcome: Progressing     Problem: Pain  Goal: Verbalizes/displays adequate comfort level or baseline comfort level  Outcome: Progressing     Problem: Safety - Adult  Goal: Free from fall injury  Outcome: Progressing     Problem: ABCDS Injury Assessment  Goal: Absence of physical injury  Outcome: Progressing     Problem: Skin/Tissue Integrity  Goal: Skin integrity remains intact  Description: 1.  Monitor for areas of redness and/or skin breakdown2.  Assess vascular access sites hourly3.  Every 4-6 hours minimum:  Change oxygen saturation probe site4.  Every 4-6 hours:  If on nasal continuous positive airway pressure, respiratory therapy assess nares and determine need for appliance change or resting period  Outcome: Progressing

## 2025-06-10 NOTE — PROGRESS NOTES
NAME: Brigid Moses  MR:  [id number]  :   1970  Admit Date:  2025    Elements of this note, were copied and pasted from Previous. Updates have been made where noted and reflect current exam and medical decision making from the DOS of this encounter.  Face to face encounter   CHIEF COMPLAINT       Chief Complaint   Patient presents with    Flank Pain     Sent in by [facility] for staghorn kidney stone, needs transferred to [facility]     HISTORY OF PRESENT ILLNESS     Brigid Moses is a 55 y.o. female admitted on 2025    Patient Active Problem List   Diagnosis    Hammer toe, acquired    Anemia, unspecified    Dysphagia following cerebral infarction    Hemiplegia and hemiparesis following cerebral infarction affecting left non-dominant side (HCC)    Primary hypertension    Major depressive disorder, recurrent    Constipation    Metabolic acidosis    Acute kidney injury    Obstructive uropathy    Moderate protein-calorie malnutrition    Staghorn kidney stones    Complicated urinary tract infection    Gram negative sepsis (HCC)       This is a face to face encounter   06/10/25-UNABLE TO GET LINE BY IR    patient is in bed- tired today pale. Thin. Has been afebrile. On room air.    2025- afebrile eating has no c/o pollack clear   in bed nad  no family present     Admitted for   Staghorn calculus [N20.0]  Flank pain [R10.9]  Acute kidney injury [N17.9]  Urinary tract infection with hematuria, site unspecified [N39.0, R31.9]    ASSESSMENT  Leukocytosis RESOLVED  Escherichia Coli bacteremia recurrent   H/o ESBL E coli 2025  Complicated Uti   Multiple bilateral nonobstructing renal calculi as well as   multiple calculi within the ureters.  Staghorn  Bilateral double-J ureteral stents  Gas noted in the left renal collecting system.    Mild right hydronephrosis.    Bilateral periureteral and mild perinephric fat stranding.   Willie    Plan:  Currently on Meropenem IV  Will discharge with Invanz- 1 gram IV    Output (mL) 300 mL 06/10/25 0959          CURRENT MEDICATIONS      sodium chloride flush  5-40 mL IntraVENous 2 times per day    lidocaine 1 % injection  50 mg IntraDERmal Once    famotidine  20 mg Oral Daily    meropenem  1,000 mg IntraVENous Q12H    atorvastatin  40 mg Oral Daily    aspirin  81 mg Oral Daily    Vitamin D  2,000 Units Oral Daily    ferrous sulfate  325 mg Oral Daily with breakfast    hydrOXYzine HCl  10 mg Oral TID    lactobacillus  1 capsule Oral Daily    linaCLOtide  72 mcg Oral QAM AC    [Held by provider] lisinopril  2.5 mg Oral Daily    megestrol  20 mg Oral Daily    melatonin  7.5 mg Oral Nightly    metoprolol succinate  12.5 mg Oral Daily    mirtazapine  15 mg Oral Nightly    pantoprazole  40 mg Oral BID    sertraline  50 mg Oral Daily    sucralfate  1 g Oral 4x Daily    vitamin B-12  1,000 mcg Oral Daily    sodium chloride flush  5-40 mL IntraVENous 2 times per day    heparin (porcine)  5,000 Units SubCUTAneous BID      sodium chloride      sodium chloride       sodium chloride flush, sodium chloride, bisacodyl, calcium carbonate, docusate sodium, [Held by provider] magnesium hydroxide, sodium chloride flush, sodium chloride, ondansetron **OR** ondansetron, polyethylene glycol, acetaminophen **OR** acetaminophen, oxyCODONE **OR** oxyCODONE, fentanNYL  DIAGNOSTIC RESULTS     Results       Procedure Component Value Units Date/Time    Culture, Blood 1 [[id number]]  (Abnormal)  (Susceptibility) Collected: 06/04/25 1528    Order Status: Completed Specimen: Blood Updated: 06/07/25 0623     Specimen Description .BLOOD     Special Requests          [employer] test comment AEROBIC BLD BOTTLE     Direct Exam DIRECT GRAM STAIN FROM BOTTLE: GRAM NEGATIVE RODS     Comment: Direct Gram Stain from bottle and Polymerase Chain Reaction (PCR) results called to and read   back by: WILVER SAL RN 6/5/25 AT 0838           Culture ESCHERICHIA COLI ESBL This organism is an Extended Spectrum Beta Lactamase (ESBL)  Date    SEDRATE 16 05/27/2011     Lab Results   Component Value Date    CRP 0.7 05/14/2012     Lab Results   Component Value Date    PROCAL 99.41 (H) 02/13/2025     Recent Labs     06/10/25  0733   INR 1.3   PROTIME 13.6*     No results found for: \"MRSAC\"    Radiology:    CT CHEST WO CONTRAST  Result Date: 6/4/2025  1. New mild T12 compression fracture with new mild superior endplate fracture. 2. Stable mild compression fractures with moderate superior endplate fractures of L1 and L2. 3. Stable mild superior L3 endplate fracture. 4. No sign of fracture of the ribs, sternum, manubrium, or visualized shoulder girdle. 5. Stable moderate scoliosis of the thoracic and lumbar spine convex towards the right. 6. Mild hazy infiltrate throughout the left lung, more prominent in the posterior lung base, consistent with atelectasis related to chronic decreased size of the left hemithorax from scoliosis.     CT ABDOMEN PELVIS WO CONTRAST Additional Contrast? None  Result Date: 6/4/2025  Large colorectal stool burden with colorectal mural thickening and mild perienteric inflammatory changes.  Findings concerning for stercoral colitis. Multiple bilateral renal and ureteral calculi with bilateral double-J ureteral stents in place. Gas noted in the left renal collecting system. Mild right hydronephrosis.       Imaging and labs were reviewed per medical records.     Thank you for involving me in the care of Brigid Moses I will continue to follow. Please do not hesitate to call [phone] for any questions or concerns.    Electronically signed by Ana Rosa Mcnulty MD on 6/10/2025 at 2:20 PM

## 2025-06-11 VITALS
WEIGHT: 100.9 LBS | SYSTOLIC BLOOD PRESSURE: 110 MMHG | HEART RATE: 78 BPM | HEIGHT: 62 IN | TEMPERATURE: 98.2 F | RESPIRATION RATE: 17 BRPM | DIASTOLIC BLOOD PRESSURE: 66 MMHG | OXYGEN SATURATION: 97 % | BODY MASS INDEX: 18.57 KG/M2

## 2025-06-11 PROCEDURE — 2500000003 HC RX 250 WO HCPCS: Performed by: INTERNAL MEDICINE

## 2025-06-11 PROCEDURE — 6370000000 HC RX 637 (ALT 250 FOR IP): Performed by: INTERNAL MEDICINE

## 2025-06-11 PROCEDURE — 99239 HOSP IP/OBS DSCHRG MGMT >30: CPT | Performed by: INTERNAL MEDICINE

## 2025-06-11 PROCEDURE — 2580000003 HC RX 258: Performed by: SPECIALIST

## 2025-06-11 PROCEDURE — 6360000002 HC RX W HCPCS: Performed by: SPECIALIST

## 2025-06-11 PROCEDURE — 6360000002 HC RX W HCPCS: Performed by: INTERNAL MEDICINE

## 2025-06-11 RX ORDER — OXYCODONE HYDROCHLORIDE 5 MG/1
5 TABLET ORAL EVERY 8 HOURS PRN
Qty: 6 TABLET | Refills: 0 | Status: SHIPPED | OUTPATIENT
Start: 2025-06-11 | End: 2025-06-14

## 2025-06-11 RX ADMIN — SUCRALFATE 1 G: 1 TABLET ORAL at 09:27

## 2025-06-11 RX ADMIN — MEROPENEM 1000 MG: 1 INJECTION INTRAVENOUS at 06:56

## 2025-06-11 RX ADMIN — MEGESTROL ACETATE 20 MG: 40 TABLET ORAL at 09:27

## 2025-06-11 RX ADMIN — HEPARIN SODIUM 5000 UNITS: 5000 INJECTION INTRAVENOUS; SUBCUTANEOUS at 09:29

## 2025-06-11 RX ADMIN — CYANOCOBALAMIN TAB 1000 MCG 1000 MCG: 1000 TAB at 09:27

## 2025-06-11 RX ADMIN — PANTOPRAZOLE SODIUM 40 MG: 40 TABLET, DELAYED RELEASE ORAL at 09:29

## 2025-06-11 RX ADMIN — Medication 1 CAPSULE: at 09:27

## 2025-06-11 RX ADMIN — METOPROLOL SUCCINATE 12.5 MG: 25 TABLET, EXTENDED RELEASE ORAL at 09:27

## 2025-06-11 RX ADMIN — Medication 10 ML: at 09:33

## 2025-06-11 RX ADMIN — OXYCODONE 10 MG: 5 TABLET ORAL at 09:32

## 2025-06-11 RX ADMIN — SERTRALINE 50 MG: 50 TABLET, FILM COATED ORAL at 09:29

## 2025-06-11 RX ADMIN — Medication 10 ML: at 09:34

## 2025-06-11 RX ADMIN — ATORVASTATIN CALCIUM 40 MG: 40 TABLET, FILM COATED ORAL at 09:28

## 2025-06-11 RX ADMIN — ASPIRIN 81 MG: 81 TABLET, DELAYED RELEASE ORAL at 09:27

## 2025-06-11 RX ADMIN — FAMOTIDINE 20 MG: 20 TABLET, FILM COATED ORAL at 09:28

## 2025-06-11 RX ADMIN — Medication 2000 UNITS: at 09:27

## 2025-06-11 RX ADMIN — HYDROXYZINE HYDROCHLORIDE 10 MG: 10 TABLET, FILM COATED ORAL at 09:27

## 2025-06-11 RX ADMIN — FERROUS SULFATE TAB 325 MG (65 MG ELEMENTAL FE) 325 MG: 325 (65 FE) TAB at 09:29

## 2025-06-11 ASSESSMENT — PAIN SCALES - WONG BAKER: WONGBAKER_NUMERICALRESPONSE: HURTS A LITTLE BIT

## 2025-06-11 ASSESSMENT — PAIN SCALES - GENERAL: PAINLEVEL_OUTOF10: 8

## 2025-06-11 ASSESSMENT — PAIN DESCRIPTION - ORIENTATION: ORIENTATION: RIGHT;LEFT

## 2025-06-11 NOTE — DISCHARGE SUMMARY
[facility] Hospitalist Physician Discharge Summary       Yessenia Perry MD  [address] [id number]  [phone]    Follow up      JabierBenedicto DO  [address] [id number]  [phone]    Follow up      Ana Rosa Mcnulty MD  [address] [facility] [facility] [address]  [place] [id number]  [phone]    Schedule an appointment as soon as possible for a visit in 2 week(s)      Rigo Badillo at [facility]    Follow up        Activity level: As tolerated     Dispo: [facility]LIN    Condition on discharge: Stable     Patient ID:  Brigid Moses  [id number]  55 y.o.  1970    Admit date: 6/4/2025    Discharge date and time:  6/11/2025  11:33 AM    Admission Diagnoses: Principal Problem:    Acute kidney injury  Active Problems:    Primary hypertension    Major depressive disorder, recurrent    Metabolic acidosis    Moderate protein-calorie malnutrition    Staghorn kidney stones    Complicated urinary tract infection    Gram negative sepsis (HCC)  Resolved Problems:    * No resolved hospital problems. *      Discharge Diagnoses: Principal Problem:    Acute kidney injury  Active Problems:    Primary hypertension    Major depressive disorder, recurrent    Metabolic acidosis    Moderate protein-calorie malnutrition    Staghorn kidney stones    Complicated urinary tract infection    Gram negative sepsis (HCC)  Resolved Problems:    * No resolved hospital problems. *      Consults:  IP CONSULT TO UROLOGY  IP CONSULT TO UROLOGY  IP CONSULT TO INFECTIOUS DISEASES  IP CONSULT TO VASCULAR ACCESS TEAM    Hospital Course:   Patient Brigid Moses is a 55 y.o. lady  with a history of recurrent staghorn calculi previously referred to [facility] for PCNL but has not yet been able to follow-up, current bilateral ureteral stents, currently resides at a nursing facility.    She was brought in for evaluation due to worsening left-sided flank pain, nausea, vomiting, poor oral intake for several days prior to admission.  No  adjustment of the mA/kV was utilized to reduce the radiation dose to as low as reasonably achievable. COMPARISON: CT of the abdomen and pelvis from 04/29/2025 HISTORY: ORDERING SYSTEM PROVIDED HISTORY: Rib Pain TECHNOLOGIST PROVIDED HISTORY: Reason for exam:->Rib Pain Decision Support Exception - unselect if not a suspected or confirmed emergency medical condition->Emergency Medical Condition (MA) FINDINGS: Mediastinum:  No evidence of mediastinal, hilar or axillary lymphadenopathy. Heart and pericardium are unremarkable. Lungs/pleura: The left hemithorax remains prominently decreased in size compared to the right, related to the stable moderate scoliosis of the thoracic and lumbar spine convex towards the right. There is mild hazy infiltrate throughout the left lung, more prominent in the posterior lung base, consistent with atelectasis. The right lung remains clear. Upper Abdomen: Normal Soft Tissues/Bones: There is no sign of any abnormality of the superficial soft tissue structures. There is no sign of fracture of the ribs, sternum, manubrium, or visualized shoulder girdle. As described above, there is stable moderate scoliosis of the thoracic and lumbar spine convex towards the right. There is a new mild T12 compression fracture with new mild superior endplate fracture. There are stable mild compression fractures with moderate superior endplate fractures of L1 and L2.  There is a stable mild superior L3 endplate fracture.     1. New mild T12 compression fracture with new mild superior endplate fracture. 2. Stable mild compression fractures with moderate superior endplate fractures of L1 and L2. 3. Stable mild superior L3 endplate fracture. 4. No sign of fracture of the ribs, sternum, manubrium, or visualized shoulder girdle. 5. Stable moderate scoliosis of the thoracic and lumbar spine convex towards the right. 6. Mild hazy infiltrate throughout the left lung, more prominent in the posterior lung base,  PROTONIX     polyethylene glycol 17 GM/SCOOP powder  Commonly known as: GLYCOLAX     Remeron 15 MG tablet  Generic drug: mirtazapine     sertraline 50 MG tablet  Commonly known as: ZOLOFT     sucralfate 1 GM tablet  Commonly known as: CARAFATE     Tylenol 325 MG tablet  Generic drug: acetaminophen     * cyanocobalamin 1000 MCG/ML injection     VITAMIN D PO           * This list has 1 medication(s) that are the same as other medications prescribed for you. Read the directions carefully, and ask your doctor or other care provider to review them with you.                STOP taking these medications      ammonium lactate 5 % Lotn lotion  Commonly known as: LAC-HYDRIN     ibuprofen 200 MG tablet  Commonly known as: ADVIL;MOTRIN     lidocaine 5 %  Commonly known as: LIDODERM     lisinopril 2.5 MG tablet  Commonly known as: PRINIVIL;ZESTRIL            ASK your doctor about these medications      * vitamin B-12 1000 MCG tablet  Commonly known as: CYANOCOBALAMIN           * This list has 1 medication(s) that are the same as other medications prescribed for you. Read the directions carefully, and ask your doctor or other care provider to review them with you.                   Where to Get Your Medications        You can get these medications from any pharmacy    Bring a paper prescription for each of these medications  ertapenem infusion  oxyCODONE 5 MG immediate release tablet           Note that more than 30 minutes was spent in preparing discharge papers, discussing discharge with patient, medication review, etc.    Signed:  Electronically signed by Delilah Lujan MD on 6/11/2025 at 11:33 AM

## 2025-06-11 NOTE — CARE COORDINATION
Ss note: 6/11/2025.11:07 AM Pt in isolation. Discharge order noted. Pt is LTC at [facility], PRECERT obtained. Pt has extended dwell line, facility aware. Arranged Physician ambulance transfer back to [facility] today at noon. Facility liaison, nursing and pt aware. Need signed ENRIQUE. KAROLINA Castellanos

## 2025-06-11 NOTE — PROGRESS NOTES
NAME: Brigid Moses  MR:  [id number]  :   1970  Admit Date:  2025    Elements of this note, were copied and pasted from Previous. Updates have been made where noted and reflect current exam and medical decision making from the DOS of this encounter.  Face to face encounter   CHIEF COMPLAINT       Chief Complaint   Patient presents with    Flank Pain     Sent in by [facility] for staghorn kidney stone, needs transferred to [facility]     HISTORY OF PRESENT ILLNESS     Brigid Moses is a 55 y.o. female admitted on 2025    Patient Active Problem List   Diagnosis    Hammer toe, acquired    Anemia, unspecified    Dysphagia following cerebral infarction    Hemiplegia and hemiparesis following cerebral infarction affecting left non-dominant side (HCC)    Primary hypertension    Major depressive disorder, recurrent    Constipation    Metabolic acidosis    Acute kidney injury    Obstructive uropathy    Moderate protein-calorie malnutrition    Staghorn kidney stones    Complicated urinary tract infection    Gram negative sepsis (HCC)       This is a face to face encounter   25- patient in bed- thin - has  been afebrile. No room air.   6/10/2025-UNABLE TO GET LINE BY IR    patient is in bed- tired today pale. Thin. Has been afebrile. On room air.    2025- afebrile eating has no c/o pollack clear   in bed nad  no family present     Admitted for   Staghorn calculus [N20.0]  Flank pain [R10.9]  Acute kidney injury [N17.9]  Urinary tract infection with hematuria, site unspecified [N39.0, R31.9]    ASSESSMENT  Leukocytosis RESOLVED  Escherichia Coli bacteremia recurrent   H/o ESBL E coli 2025  Complicated Uti   Multiple bilateral nonobstructing renal calculi as well as   multiple calculi within the ureters.  Staghorn  Bilateral double-J ureteral stents  Gas noted in the left renal collecting system.    Mild right hydronephrosis.    Bilateral periureteral and mild perinephric fat stranding.  from bottle and Polymerase Chain Reaction (PCR) results called to and read   back by: WILVER SAL RN 6/5/25 AT 0838           Culture ESCHERICHIA COLI ESBL This organism is an Extended Spectrum Beta Lactamase (ESBL)  and resistance to therapy with penicillins, cephalosporins and aztreonam is expected. These organisms generally remain susceptible to carbapenems. Consider ID Consultation. CONTACT PRECAUTIONS INDICATED.       Candida auris Not Detected     Candida albicans Not Detected     Candida glabrata Not Detected     Candida krusei Not Detected     Candida parapsilosis Not Detected     Candida tropicalis Not Detected     Cryptococcus neoformans/gattii Not Detected     Acinetobacter calcoaceticus-baumannii complex Not Detected     Escherichia Coli DETECTED     Enterobacter Cloacae Complex Not Detected     Enterobacterales DETECTED     Klebsiella oxytoca Not Detected     Klebsiella aerogenes Not Detected     Klebsiella pneumoniae group Not Detected     Pseudomonas aeruginosa Not Detected     Proteus spp Not Detected     Salmonella species Not Detected     SERRATIA MARCESCENS Not Detected     Stenotrophomonas maltophilia Not Detected     Haemophilus influenzae Not Detected     Listeria monocytogenes Not Detected     Bacteroides fragilis Not Detected     Neisseria Meningitidis, NMNI Not Detected     Staphylococcus spp Not Detected     Staphylococcus Aureus Not Detected     Staphylococcus epidermidis Not Detected     Staphylococcus lugdunensis Not Detected     Streptococcus spp Not Detected     Enterococcus faecalis Not Detected     Enterococcus faecium Not Detected     Streptococcus agalactiae (Group B) Not Detected     Streptococcus pneumoniae Not Detected     Streptococcus pyogenes Not Detected     Resistant gene imp by PCR Not Detected     Resistant gene ctx-m by PCR DETECTED     Resistant gene kpc by PCR Not Detected     Colistin Resistance mcr-1 gene by PCR Not Detected     Resistant gene ndm by PCR Not

## 2025-06-12 NOTE — PROGRESS NOTES
Physician Progress Note      PATIENT:               ZONIA ALLEN  CSN #:                  [id number]  :                       1970  ADMIT DATE:       2025 2:07 PM  DISCH DATE:        2025 12:58 PM  RESPONDING  PROVIDER #:        Leon Mota MD          QUERY TEXT:    Sepsis is documented in the medical record Urology Consults by Leon Mota MD at 2025. Please provide additional clinical indicators supportive of   your documentation. Or please document if the diagnosis of sepsis has been   ruled out after study.    The clinical indicators include:  AMBROCIO, UTI\"    \" Sepsis of urinary origin/UTI, improving/resolving, Bacteremia, final blood   cultures pending\" Urology Consults by Leon Mota MD at 2025    \" Complicated/recurrent urinary tract infection with E. coli bacteremia  - Continue meropenem.  Cultures from prior admission showed ESBL producing E.   coli, presumably has the same at this time as preliminary blood culture   results show E. coli\" Internal Medicine Progress Notes by Wili Walker DO at 2025    \" Complicated/recurrent urinary tract infection\" H&P by Wili Walker DO   at 2025    \"Leukocytosis, Escherichia Coli bacteremia recurrent, H/o ESBL E coli 2025\"   Infectious Disease Progress Notes by Ana Rosa Mcnulty MD at 2025    \" Complicated/recurrent urinary tract infection with E. coli bacteremia, -   Blood cultures showing ESBL producing E. coli - Urine culture showing ESBL   producing E. coli as well as Pseudomonas aeruginosa\" Internal Medicine   Progress Notes by Wili Walker DO at 2025    WBC  14.9, - 11.2  HR  96, - 105,  BP -92/65,  93/58,    IV meropenem, Blood cultures and urine culture    Thank you,  VERENA Castellon CDS  Options provided:  -- Sepsis present as evidenced by, Please document evidence.  -- Sepsis was ruled out after study  -- Other - I will add my own diagnosis  -- Disagree - Not  applicable / Not valid  -- Disagree - Clinically unable to determine / Unknown  -- Refer to Clinical Documentation Reviewer    PROVIDER RESPONSE TEXT:    Provider is clinically unable to determine a response to this query.    Query created by: Xiang Espinoza on 6/11/2025 1:29 AM      Electronically signed by:  Leon Mota MD 6/12/2025 11:27 AM

## 2025-06-16 NOTE — PROGRESS NOTES
Physician Progress Note      PATIENT:               ZONIA ALLEN  CSN #:                  [id number]  :                       1970  ADMIT DATE:       2025 2:07 PM  DISCH DATE:        2025 12:58 PM  RESPONDING  PROVIDER #:        Delilah Lujan MD          QUERY TEXT:    Acute Kidney Injury is documented in the medical record Internal Medicine   Progress Notes by Wili Walker DO at 2025.  Please provide   additional clinical indicators supportive of your documentation. Or please   document if the diagnosis of AMBROCIO has been ruled out after study.    The clinical indicators include:  55yr old, recurrent urinary tract infection    \" Acute kidney injury with normal anion gap metabolic acidosis, Bilateral   staghorn calculi, - Continue IV fluids D5-0.45% NaCl with 75 mEq NaHCO3 at 100   mL/h\" Internal Medicine Progress Notes by Wili Walker DO at 2025    \"Acute kidney injury with normal anion gap metabolic acidosis\" H&P by   Wili Walker DO at 2025    On  Cr 1.6, BUN 49, GFR 37,  On  Cr 1.5, BUN 44, GFR 40,  On  Cr 1.3, BUN 25, GFR 48,  On  Cr 1.3, BUN 20, GFR 49,  On  Cr 1.5, BUN 20 GFR 40,    IV fluids, monitoring lab,    Thank you,  VERENA Castellon CDS  Options provided:  -- Acute kidney injury evidenced by, Please document evidence as well as a   numerical baseline creatinine, if known.  -- Acute kidney injury ruled out after study  -- Other - I will add my own diagnosis  -- Disagree - Not applicable / Not valid  -- Disagree - Clinically unable to determine / Unknown  -- Refer to Clinical Documentation Reviewer    PROVIDER RESPONSE TEXT:    This patient has an acute kidney injury as evidenced by    Query created by: Xiang Espinoza on 2025 10:43 AM      Electronically signed by:  Delilah Lujan MD 2025 2:22 PM

## 2025-06-16 NOTE — PROGRESS NOTES
Physician Progress Note      PATIENT:               ZONIA ALLEN  CSN #:                  [id number]  :                       1970  ADMIT DATE:       2025 2:07 PM  DISCH DATE:        2025 12:58 PM  RESPONDING  PROVIDER #:        Adelfo Elizalde MD          QUERY TEXT:    Moderate protein calorie malnutrition is documented in the medical record   Internal Medicine Progress Notes by Wili Walker DO at 2025. Please   provide additional clinical indicators supportive of your documentation. Or   please document if the diagnosis of Moderate protein calorie malnutrition has   been ruled out after study.    The clinical indicators include:  55 yr old,    \" Moderate protein calorie malnutrition - Continue oral nutritional   supplements  BMI 18.45 kg/m? \"Internal Medicine Progress Notes by Wili Walker DO at   2025    Albumin / 2.8,  Protein, Total 6.9    oral nutritional supplements, IV fluids,    Thank you,  VERENA Castellon CDS  Options provided:  -- Moderate protein calorie malnutrition present as evidenced by, Please   document evidence.  -- Moderate protein calorie malnutrition was ruled out after study  -- Other - I will add my own diagnosis  -- Disagree - Not applicable / Not valid  -- Disagree - Clinically unable to determine / Unknown  -- Refer to Clinical Documentation Reviewer    PROVIDER RESPONSE TEXT:    Moderate protein calorie malnutrition is present as evidenced by albumin 2.8   mg/dL    Query created by: Xiang Espinoza on 2025 1:33 AM      Electronically signed by:  Adelfo Elizalde MD 2025 8:28 AM

## 2025-06-28 NOTE — PROGRESS NOTES
Physician Progress Note      PATIENT:               ZONIA ALLEN  CSN #:                  [id number]  :                       1970  ADMIT DATE:       2025 2:07 PM  DISCH DATE:        2025 12:58 PM  RESPONDING  PROVIDER #:        Delilah Lujan MD          QUERY TEXT:    UTI is documented in the medical record Discharge Summary by Delilah Lujan MD   at 2025.  Please clarify the relationship, if any, with the Feliz   catheter:    The clinical indicators include:  Mild right hydronephrosis, AMBROCIO,    \" She had a Feliz catheter placed this admission.  CT shows bilateral stents   in good position.  Bilateral staghorn calculi status post stents, Sepsis of urinary origin/UTI,   improving/resolving, Continue with bilateral stents and Feliz for now \"   Urology Consults by Leon Mota MD at 2025    \" urinalysis suggestive of urinary tract infection. She has known history of   complicated UTI with multiple bilateral nonobstructive renal calculi as well   as staghorn calculi.\" Discharge Summary by Delilah Lujan MD at 2025    \"Complicated/recurrent urinary tract infection with E. coli bacteremia\"   Internal Medicine Progress Notes by Wili Walker DO at 2025    Urine Culture ESCHERICHIA COLI 50 ,000 CFU/ML, PSEUDOMONAS AERUGINOSA 10   to 50,000 CFU/ML, <10,000 CFU/ML PROTEUS SPECIES    WBC 14.9    UA  Color Yellow  Ketones, Urine TRACEA  Leukocyte Esterase LARGEA    IV meropenem, Urine Culture, UA, Infectious Disease consult,    Thank you,  Xiang Espinoza [facility] CDS  Options provided:  -- UTI related to indwelling urinary catheter  -- UTI not related to indwelling urinary catheter  -- UTI related to ureteral stent  -- UTI not related to ureteral stent  -- Other - I will add my own diagnosis  -- Disagree - Not applicable / Not valid  -- Disagree - Clinically unable to determine / Unknown  -- Refer to Clinical Documentation Reviewer    PROVIDER RESPONSE TEXT:    UTI is related to the

## 2025-08-20 ENCOUNTER — OFFICE VISIT (OUTPATIENT)
Age: 55
End: 2025-08-20
Payer: COMMERCIAL

## 2025-08-20 DIAGNOSIS — G89.29 CHRONIC LOW BACK PAIN WITHOUT SCIATICA, UNSPECIFIED BACK PAIN LATERALITY: Primary | ICD-10-CM

## 2025-08-20 DIAGNOSIS — M54.50 CHRONIC LOW BACK PAIN WITHOUT SCIATICA, UNSPECIFIED BACK PAIN LATERALITY: Primary | ICD-10-CM

## 2025-08-20 PROCEDURE — 99204 OFFICE O/P NEW MOD 45 MIN: CPT | Performed by: ORTHOPAEDIC SURGERY

## 2025-08-20 PROCEDURE — G8427 DOCREV CUR MEDS BY ELIG CLIN: HCPCS | Performed by: ORTHOPAEDIC SURGERY

## 2025-08-20 PROCEDURE — 1036F TOBACCO NON-USER: CPT | Performed by: ORTHOPAEDIC SURGERY

## 2025-08-20 PROCEDURE — 3017F COLORECTAL CA SCREEN DOC REV: CPT | Performed by: ORTHOPAEDIC SURGERY

## 2025-08-20 PROCEDURE — G8419 CALC BMI OUT NRM PARAM NOF/U: HCPCS | Performed by: ORTHOPAEDIC SURGERY

## 2025-08-20 RX ORDER — OXYCODONE HYDROCHLORIDE 5 MG/1
5 TABLET ORAL PRN
COMMUNITY
Start: 2025-07-07

## 2025-08-20 RX ORDER — POTASSIUM CHLORIDE 1500 MG/1
20 TABLET, EXTENDED RELEASE ORAL ONCE
COMMUNITY
Start: 2025-08-19 | End: 2025-08-25

## (undated) DEVICE — SOLUTION IV IRRIG WATER 1000ML POUR BRL 2F7114

## (undated) DEVICE — SEAL ENDOSCP INSTR 7FR BX SELF SEAL

## (undated) DEVICE — CATHETER URET 5FR L70CM OPN END SGL LUMN INJ HUB FLEXIMA

## (undated) DEVICE — CYSTO: Brand: MEDLINE INDUSTRIES, INC.

## (undated) DEVICE — TUBING, SUCTION, 1/4" X 10', STRAIGHT: Brand: MEDLINE

## (undated) DEVICE — SOLUTION IRRIG 3000ML 0.9% SOD CHL USP UROMATIC PLAS CONT

## (undated) DEVICE — BASIC SINGLE BASIN 1-LF: Brand: MEDLINE INDUSTRIES, INC.

## (undated) DEVICE — 4-PORT MANIFOLD: Brand: NEPTUNE 2

## (undated) DEVICE — CAMERA STRYKER 1488 HD GEN

## (undated) DEVICE — CYSTO PACK: Brand: MEDLINE INDUSTRIES, INC.

## (undated) DEVICE — GLOVE ORANGE PI 7   MSG9070

## (undated) DEVICE — SYRINGE MED 20ML STD CLR PLAS LUERLOCK TIP N CTRL DISP

## (undated) DEVICE — MEDIA CONTRAST ISOVUE  300 10X50ML

## (undated) DEVICE — GUIDEWIRE URO L150CM DIA0.035IN TAPR 3CM NIT HYDRPHLC ANG

## (undated) DEVICE — TOWEL,OR,DSP,ST,BLUE,STD,6/PK,12PK/CS: Brand: MEDLINE

## (undated) DEVICE — NEPTUNE E-SEP SMOKE EVACUATION PENCIL, COATED, 70MM BLADE, PUSH BUTTON SWITCH: Brand: NEPTUNE E-SEP

## (undated) DEVICE — KENDALL 450 SERIES MONITORING FOAM ELECTRODE - RECTANGULAR SHAPE ( 3/PK): Brand: KENDALL

## (undated) DEVICE — SOL IRR SOD CHL 0.9% TITAN XL CNTNR 3000ML

## (undated) DEVICE — GARMENT,MEDLINE,DVT,INT,CALF,MED, GEN2: Brand: MEDLINE

## (undated) DEVICE — Z INACTIVE USE 2660664 SOLUTION IRRIG 3000ML 0.9% SOD CHL USP UROMATIC PLAS CONT

## (undated) DEVICE — GUIDEWIRE ENDOSCP L150CM DIA0.035IN TIP 3CM PTFE NIT

## (undated) DEVICE — WIPES SKIN CLOTH READYPREP 9 X 10.5 IN 2% CHLORHEX GLUCONATE CHG PREOP

## (undated) DEVICE — GLOVE ORANGE PI 7 1/2   MSG9075

## (undated) DEVICE — ADAPTER CLEANING PORPOISE CLEANING

## (undated) DEVICE — SOLUTION IRRIG 1000ML STRL H2O USP PLAS POUR BTL

## (undated) DEVICE — KIT BEDSIDE REVITAL OX 500ML

## (undated) DEVICE — CATHETER F BLLN 5CC 16FR 2 W HYDRGEL COAT LESS TRAUM LUB

## (undated) DEVICE — GUIDEWIRE UROLOGICAL STR STD 0.035 IN X150 CM (QTY = BOX OF 5)

## (undated) DEVICE — Z INACTIVE USE 2635503 SOLUTION IRRIG 3000ML ST H2O USP UROMATIC PLAS CONT

## (undated) DEVICE — MASK CAPNOGRAPHY AD W35IN DIA58IN SAMP LN L10FT O2 LN

## (undated) DEVICE — SOLUTION IV 1000 ML 0.9 NACL INJ USP EXCEL PLAS CONTAINER

## (undated) DEVICE — YANKAUER,BULB TIP,W/O VENT,RIGID,STERILE: Brand: MEDLINE

## (undated) DEVICE — AIR/WATER CLEANING ADAPTER FOR OLYMPUS® GI ENDOSCOPE: Brand: BULLDOG®

## (undated) DEVICE — DRAINBAG,ANTI-REFLUX TOWER,L/F,2000ML,LL: Brand: MEDLINE

## (undated) DEVICE — SUPPLEMENT DIGESTIVE H2O SOL GI-EASE

## (undated) DEVICE — BAG DRNGE COMB PK

## (undated) DEVICE — SOLUTION IRRIG 3000ML STRL H2O USP UROMATIC PLAS CONT

## (undated) DEVICE — GAUZE,SPONGE,4"X4",16PLY,XRAY,STRL,LF: Brand: MEDLINE

## (undated) DEVICE — SPONGE GZ 4IN 4IN 4 PLY N WVN AVANT

## (undated) DEVICE — GOWN,SIRUS,FABRNF,XL,20/CS: Brand: MEDLINE

## (undated) DEVICE — HOSE CONN FOR WST MGMT SYS NEPTUNE 2

## (undated) DEVICE — HIGH POWER SINGLE-USE LASER FIBER: Brand: FLEXIVA™ ID TRACTIP

## (undated) DEVICE — GOWN,SIRUS,NONRNF,SETINSLV,XL,20/CS: Brand: MEDLINE

## (undated) DEVICE — BAG SPECIMEN BIOHAZARD 6IN X 9IN

## (undated) DEVICE — SYRINGE MED 10ML LUERLOCK TIP W/O SFTY DISP

## (undated) DEVICE — CONTAINER SPEC COLL 960ML POLYPR TRIANG GRAD INTAKE/OUTPUT

## (undated) DEVICE — GUIDEWIRE ENDO L150CM DIA0.035IN STR TIP

## (undated) DEVICE — FLEXIVA  PULSE  AND  FLEXIVA  PULSE  TRACTIP  LASER  FIBERS  ARE  HIGH  POWER  SINGLE-USE FIBER: Brand: FLEXIVA PULSE ID

## (undated) DEVICE — GLOVE SURG SIGNATURE LTX ESS LTX PF 7.5

## (undated) DEVICE — JELLY,LUBE,STERILE,FLIP TOP,TUBE,4-OZ: Brand: MEDLINE

## (undated) DEVICE — SWABSTICK SURG PREP BENZOIN TINCTURE SINGLE ST

## (undated) DEVICE — Device: Brand: DEFENDO VALVE AND CONNECTOR KIT

## (undated) DEVICE — STRIP SKIN CLSR W1XL5IN NYL REINF CURAD

## (undated) DEVICE — FORCEPS BX L240CM JAW DIA2.8MM L CAP W/ NDL MIC MESH TOOTH

## (undated) DEVICE — 1.5 FR, 120 CM, NITI TIPLESS STONE BASKET: Brand: HALO

## (undated) DEVICE — BLOCK BITE 60FR CAREGUARD